# Patient Record
Sex: MALE | Race: WHITE | HISPANIC OR LATINO | URBAN - METROPOLITAN AREA
[De-identification: names, ages, dates, MRNs, and addresses within clinical notes are randomized per-mention and may not be internally consistent; named-entity substitution may affect disease eponyms.]

---

## 2018-04-04 ENCOUNTER — INPATIENT (INPATIENT)
Facility: HOSPITAL | Age: 58
LOS: 1 days | Discharge: HOME | DRG: 638 | End: 2018-04-06
Attending: INTERNAL MEDICINE | Admitting: INTERNAL MEDICINE
Payer: COMMERCIAL

## 2018-04-04 VITALS
SYSTOLIC BLOOD PRESSURE: 131 MMHG | TEMPERATURE: 98 F | RESPIRATION RATE: 18 BRPM | DIASTOLIC BLOOD PRESSURE: 96 MMHG | HEIGHT: 66 IN | WEIGHT: 182.1 LBS | OXYGEN SATURATION: 99 % | HEART RATE: 68 BPM

## 2018-04-04 DIAGNOSIS — R73.9 HYPERGLYCEMIA, UNSPECIFIED: ICD-10-CM

## 2018-04-04 LAB
ACETONE SERPL-MCNC: NEGATIVE — SIGNIFICANT CHANGE UP
ALBUMIN SERPL ELPH-MCNC: 4 G/DL — SIGNIFICANT CHANGE UP (ref 3.5–5)
ALP SERPL-CCNC: 147 U/L — HIGH (ref 40–120)
ALT FLD-CCNC: 42 U/L DA — SIGNIFICANT CHANGE UP (ref 10–60)
ANION GAP SERPL CALC-SCNC: 9 MMOL/L — SIGNIFICANT CHANGE UP (ref 5–17)
AST SERPL-CCNC: 17 U/L — SIGNIFICANT CHANGE UP (ref 10–40)
BASE EXCESS BLDV CALC-SCNC: 6.3 MMOL/L — HIGH (ref -2–2)
BASOPHILS # BLD AUTO: 0.1 K/UL — SIGNIFICANT CHANGE UP (ref 0–0.2)
BASOPHILS NFR BLD AUTO: 1.5 % — SIGNIFICANT CHANGE UP (ref 0–2)
BILIRUB SERPL-MCNC: 1 MG/DL — SIGNIFICANT CHANGE UP (ref 0.2–1.2)
BUN SERPL-MCNC: 19 MG/DL — HIGH (ref 7–18)
CALCIUM SERPL-MCNC: 9 MG/DL — SIGNIFICANT CHANGE UP (ref 8.4–10.5)
CHLORIDE SERPL-SCNC: 94 MMOL/L — LOW (ref 96–108)
CO2 SERPL-SCNC: 28 MMOL/L — SIGNIFICANT CHANGE UP (ref 22–31)
CREAT SERPL-MCNC: 1.7 MG/DL — HIGH (ref 0.5–1.3)
EOSINOPHIL # BLD AUTO: 0.3 K/UL — SIGNIFICANT CHANGE UP (ref 0–0.5)
EOSINOPHIL NFR BLD AUTO: 2.8 % — SIGNIFICANT CHANGE UP (ref 0–6)
GLUCOSE SERPL-MCNC: 457 MG/DL — CRITICAL HIGH (ref 70–99)
HCO3 BLDV-SCNC: 33 MMOL/L — HIGH (ref 21–29)
HCT VFR BLD CALC: 48.7 % — SIGNIFICANT CHANGE UP (ref 39–50)
HGB BLD-MCNC: 16.3 G/DL — SIGNIFICANT CHANGE UP (ref 13–17)
HOROWITZ INDEX BLDV+IHG-RTO: 21 — SIGNIFICANT CHANGE UP
LIDOCAIN IGE QN: 184 U/L — SIGNIFICANT CHANGE UP (ref 73–393)
LYMPHOCYTES # BLD AUTO: 2.1 K/UL — SIGNIFICANT CHANGE UP (ref 1–3.3)
LYMPHOCYTES # BLD AUTO: 21.2 % — SIGNIFICANT CHANGE UP (ref 13–44)
MCHC RBC-ENTMCNC: 28.9 PG — SIGNIFICANT CHANGE UP (ref 27–34)
MCHC RBC-ENTMCNC: 33.5 GM/DL — SIGNIFICANT CHANGE UP (ref 32–36)
MCV RBC AUTO: 86.3 FL — SIGNIFICANT CHANGE UP (ref 80–100)
MONOCYTES # BLD AUTO: 0.5 K/UL — SIGNIFICANT CHANGE UP (ref 0–0.9)
MONOCYTES NFR BLD AUTO: 5.4 % — SIGNIFICANT CHANGE UP (ref 2–14)
NEUTROPHILS # BLD AUTO: 6.7 K/UL — SIGNIFICANT CHANGE UP (ref 1.8–7.4)
NEUTROPHILS NFR BLD AUTO: 69.1 % — SIGNIFICANT CHANGE UP (ref 43–77)
PCO2 BLDV: 55 MMHG — HIGH (ref 35–50)
PH BLDV: 7.39 — SIGNIFICANT CHANGE UP (ref 7.35–7.45)
PLATELET # BLD AUTO: 273 K/UL — SIGNIFICANT CHANGE UP (ref 150–400)
PO2 BLDV: SIGNIFICANT CHANGE UP MMHG (ref 25–45)
POTASSIUM SERPL-MCNC: 3.8 MMOL/L — SIGNIFICANT CHANGE UP (ref 3.5–5.3)
POTASSIUM SERPL-SCNC: 3.8 MMOL/L — SIGNIFICANT CHANGE UP (ref 3.5–5.3)
PROT SERPL-MCNC: 8.4 G/DL — HIGH (ref 6–8.3)
RBC # BLD: 5.64 M/UL — SIGNIFICANT CHANGE UP (ref 4.2–5.8)
RBC # FLD: 10.9 % — SIGNIFICANT CHANGE UP (ref 10.3–14.5)
SAO2 % BLDV: 16 % — LOW (ref 67–88)
SODIUM SERPL-SCNC: 131 MMOL/L — LOW (ref 135–145)
WBC # BLD: 9.7 K/UL — SIGNIFICANT CHANGE UP (ref 3.8–10.5)
WBC # FLD AUTO: 9.7 K/UL — SIGNIFICANT CHANGE UP (ref 3.8–10.5)

## 2018-04-04 PROCEDURE — 99285 EMERGENCY DEPT VISIT HI MDM: CPT | Mod: 25

## 2018-04-04 PROCEDURE — 71045 X-RAY EXAM CHEST 1 VIEW: CPT | Mod: 26

## 2018-04-04 RX ORDER — SODIUM CHLORIDE 9 MG/ML
1000 INJECTION, SOLUTION INTRAVENOUS ONCE
Qty: 0 | Refills: 0 | Status: COMPLETED | OUTPATIENT
Start: 2018-04-04 | End: 2018-04-04

## 2018-04-04 RX ORDER — INSULIN HUMAN 100 [IU]/ML
6 INJECTION, SOLUTION SUBCUTANEOUS ONCE
Qty: 0 | Refills: 0 | Status: DISCONTINUED | OUTPATIENT
Start: 2018-04-04 | End: 2018-04-04

## 2018-04-04 RX ORDER — INSULIN LISPRO 100/ML
8 VIAL (ML) SUBCUTANEOUS ONCE
Qty: 0 | Refills: 0 | Status: DISCONTINUED | OUTPATIENT
Start: 2018-04-04 | End: 2018-04-05

## 2018-04-04 RX ADMIN — SODIUM CHLORIDE 4000 MILLILITER(S): 9 INJECTION, SOLUTION INTRAVENOUS at 22:02

## 2018-04-04 NOTE — ED PROVIDER NOTE - OBJECTIVE STATEMENT
58 y/o M pt w/ PMHx of DM ( not on meds), HTN presents c/o urinary frequency and urge to drink x 2 weeks. Was at this hospital some years ago after findings glucose level of 900; was also hospitalized for an infection of the pancreas at that time. Since then pt has been controlling his diabetes w/ diet and exercise. Over the past two weeks began feeling the urge to drink and has been urinating frequenting. Called his PMD, Dr. Garcia, who told him to present to the ED. Pt denies any other complaints. NKDA.

## 2018-04-04 NOTE — ED PROVIDER NOTE - PHYSICAL EXAMINATION
Attending MD Hensley: A & O x 3, NAD, EOMI b/l, PERRL b/l; lungs CTAB, heart with reg rhythm without murmur; abdomen soft NTND; extremities with no edema; affect appropriate. neuro exam non focal with no motor or sensory deficits. peripheral pulses full and equal in bilateral upper and lower extremities

## 2018-04-04 NOTE — ED PROVIDER NOTE - MEDICAL DECISION MAKING DETAILS
Attending MD Hensley: 57M with DM not on meds, HTN presenting with polyuria, increased thirst, FS high 400s. Plan to obtain labs to ro JESSY

## 2018-04-04 NOTE — ED ADULT NURSE NOTE - OBJECTIVE STATEMENT
Pt stated," I came because my sugar was high when I checked at home." Pt medicated as per order. Pt not in any acute distress.

## 2018-04-05 DIAGNOSIS — Z29.9 ENCOUNTER FOR PROPHYLACTIC MEASURES, UNSPECIFIED: ICD-10-CM

## 2018-04-05 DIAGNOSIS — I10 ESSENTIAL (PRIMARY) HYPERTENSION: ICD-10-CM

## 2018-04-05 DIAGNOSIS — N17.9 ACUTE KIDNEY FAILURE, UNSPECIFIED: ICD-10-CM

## 2018-04-05 DIAGNOSIS — R73.9 HYPERGLYCEMIA, UNSPECIFIED: ICD-10-CM

## 2018-04-05 DIAGNOSIS — E78.5 HYPERLIPIDEMIA, UNSPECIFIED: ICD-10-CM

## 2018-04-05 LAB
ANION GAP SERPL CALC-SCNC: 7 MMOL/L — SIGNIFICANT CHANGE UP (ref 5–17)
APPEARANCE UR: CLEAR — SIGNIFICANT CHANGE UP
BILIRUB UR-MCNC: NEGATIVE — SIGNIFICANT CHANGE UP
BUN SERPL-MCNC: 22 MG/DL — HIGH (ref 7–18)
CALCIUM SERPL-MCNC: 8.2 MG/DL — LOW (ref 8.4–10.5)
CHLORIDE SERPL-SCNC: 101 MMOL/L — SIGNIFICANT CHANGE UP (ref 96–108)
CHOLEST SERPL-MCNC: 165 MG/DL — SIGNIFICANT CHANGE UP (ref 10–199)
CO2 SERPL-SCNC: 27 MMOL/L — SIGNIFICANT CHANGE UP (ref 22–31)
COLOR SPEC: YELLOW — SIGNIFICANT CHANGE UP
CREAT SERPL-MCNC: 1.47 MG/DL — HIGH (ref 0.5–1.3)
DIFF PNL FLD: NEGATIVE — SIGNIFICANT CHANGE UP
GLUCOSE BLDC GLUCOMTR-MCNC: 205 MG/DL — HIGH (ref 70–99)
GLUCOSE BLDC GLUCOMTR-MCNC: 230 MG/DL — HIGH (ref 70–99)
GLUCOSE BLDC GLUCOMTR-MCNC: 307 MG/DL — HIGH (ref 70–99)
GLUCOSE BLDC GLUCOMTR-MCNC: 370 MG/DL — HIGH (ref 70–99)
GLUCOSE BLDC GLUCOMTR-MCNC: 550 MG/DL — CRITICAL HIGH (ref 70–99)
GLUCOSE SERPL-MCNC: 439 MG/DL — HIGH (ref 70–99)
GLUCOSE UR QL: 1000 MG/DL
HBA1C BLD-MCNC: >15.5 % — HIGH (ref 4–5.6)
HDLC SERPL-MCNC: 33 MG/DL — LOW (ref 40–125)
KETONES UR-MCNC: NEGATIVE — SIGNIFICANT CHANGE UP
LEUKOCYTE ESTERASE UR-ACNC: NEGATIVE — SIGNIFICANT CHANGE UP
LIPID PNL WITH DIRECT LDL SERPL: 82 MG/DL — SIGNIFICANT CHANGE UP
NITRITE UR-MCNC: NEGATIVE — SIGNIFICANT CHANGE UP
PH UR: 6.5 — SIGNIFICANT CHANGE UP (ref 5–8)
POTASSIUM SERPL-MCNC: 3.3 MMOL/L — LOW (ref 3.5–5.3)
POTASSIUM SERPL-SCNC: 3.3 MMOL/L — LOW (ref 3.5–5.3)
PROT UR-MCNC: NEGATIVE — SIGNIFICANT CHANGE UP
SODIUM SERPL-SCNC: 135 MMOL/L — SIGNIFICANT CHANGE UP (ref 135–145)
SP GR SPEC: 1.01 — SIGNIFICANT CHANGE UP (ref 1.01–1.02)
TOTAL CHOLESTEROL/HDL RATIO MEASUREMENT: 5 RATIO — SIGNIFICANT CHANGE UP (ref 3.4–9.6)
TRIGL SERPL-MCNC: 250 MG/DL — HIGH (ref 10–149)
UROBILINOGEN FLD QL: NEGATIVE — SIGNIFICANT CHANGE UP

## 2018-04-05 RX ORDER — INSULIN GLARGINE 100 [IU]/ML
20 INJECTION, SOLUTION SUBCUTANEOUS AT BEDTIME
Qty: 0 | Refills: 0 | Status: DISCONTINUED | OUTPATIENT
Start: 2018-04-05 | End: 2018-04-06

## 2018-04-05 RX ORDER — INSULIN LISPRO 100/ML
6 VIAL (ML) SUBCUTANEOUS ONCE
Qty: 0 | Refills: 0 | Status: COMPLETED | OUTPATIENT
Start: 2018-04-05 | End: 2018-04-05

## 2018-04-05 RX ORDER — AMLODIPINE BESYLATE 2.5 MG/1
10 TABLET ORAL DAILY
Qty: 0 | Refills: 0 | Status: DISCONTINUED | OUTPATIENT
Start: 2018-04-05 | End: 2018-04-06

## 2018-04-05 RX ORDER — INSULIN GLARGINE 100 [IU]/ML
15 INJECTION, SOLUTION SUBCUTANEOUS AT BEDTIME
Qty: 0 | Refills: 0 | Status: DISCONTINUED | OUTPATIENT
Start: 2018-04-05 | End: 2018-04-05

## 2018-04-05 RX ORDER — DEXTROSE MONOHYDRATE, SODIUM CHLORIDE, AND POTASSIUM CHLORIDE 50; .745; 4.5 G/1000ML; G/1000ML; G/1000ML
1000 INJECTION, SOLUTION INTRAVENOUS
Qty: 0 | Refills: 0 | Status: DISCONTINUED | OUTPATIENT
Start: 2018-04-05 | End: 2018-04-06

## 2018-04-05 RX ORDER — METOPROLOL TARTRATE 50 MG
100 TABLET ORAL DAILY
Qty: 0 | Refills: 0 | Status: DISCONTINUED | OUTPATIENT
Start: 2018-04-05 | End: 2018-04-06

## 2018-04-05 RX ORDER — INSULIN LISPRO 100/ML
VIAL (ML) SUBCUTANEOUS
Qty: 0 | Refills: 0 | Status: DISCONTINUED | OUTPATIENT
Start: 2018-04-05 | End: 2018-04-06

## 2018-04-05 RX ORDER — DEXTROSE 50 % IN WATER 50 %
1 SYRINGE (ML) INTRAVENOUS ONCE
Qty: 0 | Refills: 0 | Status: DISCONTINUED | OUTPATIENT
Start: 2018-04-05 | End: 2018-04-06

## 2018-04-05 RX ORDER — DEXTROSE 50 % IN WATER 50 %
12.5 SYRINGE (ML) INTRAVENOUS ONCE
Qty: 0 | Refills: 0 | Status: DISCONTINUED | OUTPATIENT
Start: 2018-04-05 | End: 2018-04-06

## 2018-04-05 RX ORDER — POTASSIUM CHLORIDE 20 MEQ
40 PACKET (EA) ORAL EVERY 4 HOURS
Qty: 0 | Refills: 0 | Status: COMPLETED | OUTPATIENT
Start: 2018-04-05 | End: 2018-04-05

## 2018-04-05 RX ORDER — DEXTROSE 50 % IN WATER 50 %
25 SYRINGE (ML) INTRAVENOUS ONCE
Qty: 0 | Refills: 0 | Status: DISCONTINUED | OUTPATIENT
Start: 2018-04-05 | End: 2018-04-06

## 2018-04-05 RX ORDER — SODIUM CHLORIDE 9 MG/ML
1000 INJECTION, SOLUTION INTRAVENOUS
Qty: 0 | Refills: 0 | Status: DISCONTINUED | OUTPATIENT
Start: 2018-04-05 | End: 2018-04-06

## 2018-04-05 RX ORDER — GLUCAGON INJECTION, SOLUTION 0.5 MG/.1ML
1 INJECTION, SOLUTION SUBCUTANEOUS ONCE
Qty: 0 | Refills: 0 | Status: DISCONTINUED | OUTPATIENT
Start: 2018-04-05 | End: 2018-04-06

## 2018-04-05 RX ORDER — ATORVASTATIN CALCIUM 80 MG/1
40 TABLET, FILM COATED ORAL AT BEDTIME
Qty: 0 | Refills: 0 | Status: DISCONTINUED | OUTPATIENT
Start: 2018-04-05 | End: 2018-04-06

## 2018-04-05 RX ORDER — INSULIN LISPRO 100/ML
5 VIAL (ML) SUBCUTANEOUS
Qty: 0 | Refills: 0 | Status: DISCONTINUED | OUTPATIENT
Start: 2018-04-05 | End: 2018-04-06

## 2018-04-05 RX ORDER — HYDROCHLOROTHIAZIDE 25 MG
25 TABLET ORAL DAILY
Qty: 0 | Refills: 0 | Status: DISCONTINUED | OUTPATIENT
Start: 2018-04-05 | End: 2018-04-06

## 2018-04-05 RX ADMIN — Medication 5 UNIT(S): at 12:25

## 2018-04-05 RX ADMIN — AMLODIPINE BESYLATE 10 MILLIGRAM(S): 2.5 TABLET ORAL at 06:18

## 2018-04-05 RX ADMIN — Medication 5: at 09:04

## 2018-04-05 RX ADMIN — ATORVASTATIN CALCIUM 40 MILLIGRAM(S): 80 TABLET, FILM COATED ORAL at 22:06

## 2018-04-05 RX ADMIN — Medication 2: at 16:48

## 2018-04-05 RX ADMIN — Medication 5 UNIT(S): at 16:49

## 2018-04-05 RX ADMIN — INSULIN GLARGINE 15 UNIT(S): 100 INJECTION, SOLUTION SUBCUTANEOUS at 01:11

## 2018-04-05 RX ADMIN — DEXTROSE MONOHYDRATE, SODIUM CHLORIDE, AND POTASSIUM CHLORIDE 125 MILLILITER(S): 50; .745; 4.5 INJECTION, SOLUTION INTRAVENOUS at 16:49

## 2018-04-05 RX ADMIN — Medication 6 UNIT(S): at 01:03

## 2018-04-05 RX ADMIN — SODIUM CHLORIDE 4000 MILLILITER(S): 9 INJECTION, SOLUTION INTRAVENOUS at 01:11

## 2018-04-05 RX ADMIN — Medication 40 MILLIEQUIVALENT(S): at 10:34

## 2018-04-05 RX ADMIN — Medication 40 MILLIEQUIVALENT(S): at 15:28

## 2018-04-05 RX ADMIN — Medication 6: at 11:33

## 2018-04-05 RX ADMIN — DEXTROSE MONOHYDRATE, SODIUM CHLORIDE, AND POTASSIUM CHLORIDE 125 MILLILITER(S): 50; .745; 4.5 INJECTION, SOLUTION INTRAVENOUS at 15:30

## 2018-04-05 RX ADMIN — Medication 25 MILLIGRAM(S): at 06:18

## 2018-04-05 RX ADMIN — INSULIN GLARGINE 20 UNIT(S): 100 INJECTION, SOLUTION SUBCUTANEOUS at 22:06

## 2018-04-05 RX ADMIN — DEXTROSE MONOHYDRATE, SODIUM CHLORIDE, AND POTASSIUM CHLORIDE 125 MILLILITER(S): 50; .745; 4.5 INJECTION, SOLUTION INTRAVENOUS at 06:39

## 2018-04-05 NOTE — H&P ADULT - NSHPPHYSICALEXAM_GEN_ALL_CORE
Vital Signs Last 24 Hrs  T(C): 36.6 (04 Apr 2018 23:45), Max: 36.7 (04 Apr 2018 20:59)  T(F): 97.9 (04 Apr 2018 23:45), Max: 98 (04 Apr 2018 20:59)  HR: 63 (04 Apr 2018 23:45) (63 - 68)  BP: 109/66 (04 Apr 2018 23:45) (109/66 - 131/96)  BP(mean): --  RR: 18 (04 Apr 2018 23:45) (18 - 18)  SpO2: 99% (04 Apr 2018 23:45) (99% - 99%)    GENERAL: NAD  HEAD:  Atraumatic, Normocephalic  EYES: EOMI, PERRLA, conjunctiva and sclera clear  ENMT: Moist mucous membranes  NECK: Supple  NERVOUS SYSTEM:  Alert & Oriented X3  CHEST/LUNG: Clear to auscultation bilaterally; No rales, rhonchi, wheezing, or rubs  HEART: Regular rate and rhythm; No murmurs, rubs, or gallops  ABDOMEN: Soft, Nontender, Nondistended; Bowel sounds present  EXTREMITIES:  2+ Peripheral Pulses, No clubbing, cyanosis, or edema

## 2018-04-05 NOTE — H&P ADULT - PROBLEM SELECTOR PLAN 5
[] Previous VTE                                                3  [] Thrombophilia                                             2  [] Lower limb paralysis                                   2    [] Current Cancer                                             2   [X] Immobilization > 24 hrs                              1  [] ICU/CCU stay > 24 hours                             1  [] Age > 60                                                         1    IMPROVE VTE Score: 1  SCDs

## 2018-04-05 NOTE — H&P ADULT - HISTORY OF PRESENT ILLNESS
58 y/o M with PMHx of DM, HTN, and HLD came in with c/o  polyuria and polydipsia since 2 weeks. Patient states that he was last admitted her [facility] 4 years ago with similar presentation, his BG was ~900 on admission and he was treated for pancreatitis. He was discharged on insulin which he took for about 1 month. However his PCP the discontinued insulin and said that he has borderline diabetes which can be controlled with exercise and diet. Patient has been complaint with diet and exercise, and says that he sporadically checks his BG, and it is usually in 120s until today when it was ~450. He denies abdominal pain, nausea or vomiting. He denies recent illness, except for a mild cold about 2 weeks ago. Denies dysuria, or hematuria. No other complaints at this time.    SH: Denies smoking, alcohol or illicit drug use. Lives with family. Ambulates independently  FH: insignificant  NKDA

## 2018-04-05 NOTE — CONSULT NOTE ADULT - ASSESSMENT
1.diabetes mellitus  poorly controlled  aic 15.5  will need insulin  continue lantus 25/humalog 6 prmeals  education  compliance  optimise control as op  d/w pt goals  avoid alcohol

## 2018-04-05 NOTE — H&P ADULT - PROBLEM SELECTOR PLAN 1
BG-482 on admission. AGAP- 9, No acidosis on vBG  Lipase- 184 (wnl)  Not on antidiabetics medications at home  Per patient he has borderline DM controlled with diet and exercise  Now presenting with polyuria and polydipsia  - f/u A1c  - pt is s/p 2L NS bolus, Will start NS +Kcl @125ml/hr  - Give Lispro 6U stat  - Start Lantus at 0.2U/kg--> 15U qHS + sliding scale  - Accuchecks q4 for now  - Lifestyle modifications

## 2018-04-05 NOTE — H&P ADULT - NSHPLABSRESULTS_GEN_ALL_CORE
16.3   9.7   )-----------( 273      ( 04 Apr 2018 21:59 )             48.7     04-04    131<L>  |  94<L>  |  19<H>  ----------------------------<  457<HH>  3.8   |  28  |  1.70<H>    Ca    9.0      04 Apr 2018 21:59    TPro  8.4<H>  /  Alb  4.0  /  TBili  1.0  /  DBili  x   /  AST  17  /  ALT  42  /  AlkPhos  147<H>  04-04

## 2018-04-05 NOTE — H&P ADULT - PROBLEM SELECTOR PLAN 2
Secondary to hyperglycemia with polyuria and polydipsia  s/p 2L NS bolus  - Start NS+KCl @125 ml/hr  - Will hold off to losartan  - Monitor BMP

## 2018-04-05 NOTE — H&P ADULT - NSHPREVIEWOFSYSTEMS_GEN_ALL_CORE
REVIEW OF SYSTEMS:  CONSTITUTIONAL: No fever, weight loss, or fatigue  EYES: No eye pain, visual disturbances, or discharge  ENMT:  No difficulty hearing, tinnitus, vertigo; No sinus or throat pain  NECK: No pain or stiffness  RESPIRATORY: No cough, wheezing, chills or hemoptysis; No shortness of breath  CARDIOVASCULAR: No chest pain, palpitations, dizziness, or leg swelling  GASTROINTESTINAL:  No abdominal or epigastric pain. No nausea, vomiting, or hematemesis; No diarrhea or constipation. No melena or hematochezia.  GENITOURINARY: No dysuria, frequency, hematuria, or incontinence  NEUROLOGICAL: No headaches, memory loss, loss of strength, numbness, or tremors  SKIN: No itching, burning, rashes, or lesions   LYMPH NODES: No enlarged glands  ENDOCRINE: polyuria, polydipsia  MUSCULOSKELETAL: No joint pain or swelling; No muscle, back, or extremity pain

## 2018-04-05 NOTE — CHART NOTE - NSCHARTNOTEFT_GEN_A_CORE
PATIENT WAS SEEN AND EXAMINED   - UNCONTROLLED DM - WILL DC PATIENT IN AM ON INSULIN 70/30,  30 UNITS Q AM  AND  15 UNITS  Q PM.   - ARF - ON IVF , RPT BMP IN AM, LOSARTAN / HCTZ IS ON HOLD, WILL MONITOR RENAL Fx AND BMP AS OUT PT.  - DC PLAN PT IN AM IF STABLE AND WILL F/ UP PATIENT ON NEXT WEEK FRIDAY IN OFFICE.  - PATIENT WILL BE F/UP BY HOSPITALIST TEAM UNTIL SUNDAY 04-, IF HE STAYS IN HOSPITAL  - DR. DRAKE

## 2018-04-05 NOTE — CONSULT NOTE ADULT - SUBJECTIVE AND OBJECTIVE BOX
HPI:  56 y/o M with PMHx of DM, HTN, and HLD came in with c/o  polyuria and polydipsia since 2 weeks. Patient states that he was last admitted her [facility] 4 years ago with similar presentation, his BG was ~900 on admission and he was treated for pancreatitis. He was discharged on insulin which he took for about 1 month. However his PCP the discontinued insulin and said that he has borderline diabetes which can be controlled with exercise and diet. Patient has been complaint with diet and exercise, and says that he sporadically checks his BG, and it is usually in 120s until today when it was ~450. He denies abdominal pain, nausea or vomiting. He denies recent illness, except for a mild cold about 2 weeks ago. Denies dysuria, or hematuria. No other complaints at this time.  recent wt loss  weakness  SH: Denies smoking, alcohol or illicit drug use. Lives with family. Ambulates independently  FH: insignificant  NKDA (05 Apr 2018 00:50)      PAST MEDICAL & SURGICAL HISTORY:  HTN (hypertension)  Diabetes  No significant past surgical history      No Known Allergies      amLODIPine   Tablet 10 milliGRAM(s) Oral daily  atorvastatin 40 milliGRAM(s) Oral at bedtime  dextrose 5%. 1000 milliLiter(s) IV Continuous <Continuous>  dextrose 50% Injectable 12.5 Gram(s) IV Push once  dextrose 50% Injectable 25 Gram(s) IV Push once  dextrose 50% Injectable 25 Gram(s) IV Push once  dextrose Gel 1 Dose(s) Oral once PRN  glucagon  Injectable 1 milliGRAM(s) IntraMuscular once PRN  hydrochlorothiazide 25 milliGRAM(s) Oral daily  insulin glargine Injectable (LANTUS) 20 Unit(s) SubCutaneous at bedtime  insulin lispro (HumaLOG) corrective regimen sliding scale   SubCutaneous three times a day before meals  insulin lispro Injectable (HumaLOG) 5 Unit(s) SubCutaneous three times a day before meals  metoprolol succinate  milliGRAM(s) Oral daily  sodium chloride 0.9% with potassium chloride 20 mEq/L 1000 milliLiter(s) IV Continuous <Continuous>      Social Hx:    FAMILY HISTORY: diabetes yes      REVIEW OF SYSTEMS:wt loss  weakness  polyuria  polydipsia    CONSTITUTIONAL: No weakness, fevers or chills  EYES/ENT: No visual changes;  No vertigo or throat pain   NECK: No pain or stiffness  RESPIRATORY: No cough, wheezing, hemoptysis; No shortness of breath  CARDIOVASCULAR: No chest pain or palpitations  GASTROINTESTINAL: No abdominal or epigastric pain. No nausea, vomiting, or hematemesis; No diarrhea or constipation. No melena or hematochezia.  GENITOURINARY: No dysuria,  hematuria  NEUROLOGICAL: No numbness or weakness  SKIN: No itching, burning, rashes, or lesions   All other review of systems is negative unless indicated above.  PHYSICAL EXAM:    Vital Signs Last 24 Hrs  T(C): 36.4 (05 Apr 2018 16:08), Max: 36.7 (04 Apr 2018 20:59)  T(F): 97.6 (05 Apr 2018 16:08), Max: 98 (04 Apr 2018 20:59)  HR: 60 (05 Apr 2018 16:08) (56 - 68)  BP: 115/69 (05 Apr 2018 16:08) (101/58 - 131/96)  BP(mean): --  RR: 17 (05 Apr 2018 16:08) (17 - 18)  SpO2: 96% (05 Apr 2018 16:08) (96% - 100%)    Constitutional: wdwn male  awake alert  in nad  lungs clear  cardia reg  abd soft  neuro ok    HEENT:    Neck:  [  ] Supple  [  ]Lymphadenopathy  [  ] JVD   [  ]No JVD  [  ] Masses   [  ] WNL    CHEST/Respiratory:  [  ] Rales      [  ] Rhonchi      [  ] Wheezing       [  ] Chest Tenderness  [  ]Clear to auscultation    Cardiovascular:  [  ]S1 S2  [  ] Reg  [  ] Irreg   [  ] Murmurs  [  ]Systolic [  ]Diastolic  [  ]No Murmur    Abdomen:  [  ]  Bowel Sounds   [  ] Soft           [  ] ABD Distention  [  ] Tenderness  [  ] Organomegaly                        [  ] Guarding Rigidity  [  ] No Guarding Rigidity  [  ] Rebound Tenderness [  ] No Rebound Tenderness    Extremities: [  ] Edema  [  ] No edema  [  ] Clubbing   [  ] Cyanosis  [  ] Palpable peripheral pulses                        [  ] No Tender Calf muscles  [  ] Tender Calf muscles    Neurological:  [  ] Alert  [  ] Awake  [  ] Oriented  x                              [  ] Confused    Skin:  [  ] Thrombophlebitis  [  ] Rashes  [  ] Dry  [  ] Ulcers    Ortho:  [  ] Joint Swelling  [  ] Joint erythema [  ] DJD [  ] Increased temp. to touch      LABS/DIAGNOSTIC TESTS                          16.3   9.7   )-----------( 273      ( 04 Apr 2018 21:59 )             48.7     WBC Count: 9.7 K/uL (04-04 @ 21:59)      04-05    135  |  101  |  22<H>  ----------------------------<  439<H>  3.3<L>   |  27  |  1.47<H>    Ca    8.2<L>      05 Apr 2018 05:54    TPro  8.4<H>  /  Alb  4.0  /  TBili  1.0  /  DBili  x   /  AST  17  /  ALT  42  /  AlkPhos  147<H>  04-04          LIVER FUNCTIONS - ( 04 Apr 2018 21:59 )  Alb: 4.0 g/dL / Pro: 8.4 g/dL / ALK PHOS: 147 U/L / ALT: 42 U/L DA / AST: 17 U/L / GGT: x                 LACTATE:      CULTURES:   amLODIPine   Tablet 10 milliGRAM(s) Oral daily  atorvastatin 40 milliGRAM(s) Oral at bedtime  dextrose 5%. 1000 milliLiter(s) IV Continuous <Continuous>  dextrose 50% Injectable 12.5 Gram(s) IV Push once  dextrose 50% Injectable 25 Gram(s) IV Push once  dextrose 50% Injectable 25 Gram(s) IV Push once  dextrose Gel 1 Dose(s) Oral once PRN  glucagon  Injectable 1 milliGRAM(s) IntraMuscular once PRN  hydrochlorothiazide 25 milliGRAM(s) Oral daily  insulin glargine Injectable (LANTUS) 20 Unit(s) SubCutaneous at bedtime  insulin lispro (HumaLOG) corrective regimen sliding scale   SubCutaneous three times a day before meals  insulin lispro Injectable (HumaLOG) 5 Unit(s) SubCutaneous three times a day before meals  metoprolol succinate  milliGRAM(s) Oral daily  sodium chloride 0.9% with potassium chloride 20 mEq/L 1000 milliLiter(s) IV Continuous <Continuous>      RADIOLOGY    CXR:

## 2018-04-05 NOTE — H&P ADULT - ASSESSMENT
56 y/o M with PMHx of DM, HTN, and HLD came in with c/o  polyuria and polydipsia since 2 weeks. Patient is admitted for hyperglycemia.

## 2018-04-06 VITALS
RESPIRATION RATE: 16 BRPM | OXYGEN SATURATION: 100 % | TEMPERATURE: 99 F | DIASTOLIC BLOOD PRESSURE: 77 MMHG | HEART RATE: 69 BPM | SYSTOLIC BLOOD PRESSURE: 124 MMHG

## 2018-04-06 LAB
ANION GAP SERPL CALC-SCNC: 9 MMOL/L — SIGNIFICANT CHANGE UP (ref 5–17)
BUN SERPL-MCNC: 19 MG/DL — HIGH (ref 7–18)
CALCIUM SERPL-MCNC: 8 MG/DL — LOW (ref 8.4–10.5)
CHLORIDE SERPL-SCNC: 108 MMOL/L — SIGNIFICANT CHANGE UP (ref 96–108)
CO2 SERPL-SCNC: 22 MMOL/L — SIGNIFICANT CHANGE UP (ref 22–31)
CREAT SERPL-MCNC: 1.1 MG/DL — SIGNIFICANT CHANGE UP (ref 0.5–1.3)
GLUCOSE BLDC GLUCOMTR-MCNC: 258 MG/DL — HIGH (ref 70–99)
GLUCOSE BLDC GLUCOMTR-MCNC: 327 MG/DL — HIGH (ref 70–99)
GLUCOSE SERPL-MCNC: 243 MG/DL — HIGH (ref 70–99)
HCT VFR BLD CALC: 40.5 % — SIGNIFICANT CHANGE UP (ref 39–50)
HGB BLD-MCNC: 13.7 G/DL — SIGNIFICANT CHANGE UP (ref 13–17)
MCHC RBC-ENTMCNC: 29.5 PG — SIGNIFICANT CHANGE UP (ref 27–34)
MCHC RBC-ENTMCNC: 33.7 GM/DL — SIGNIFICANT CHANGE UP (ref 32–36)
MCV RBC AUTO: 87.6 FL — SIGNIFICANT CHANGE UP (ref 80–100)
PLATELET # BLD AUTO: 186 K/UL — SIGNIFICANT CHANGE UP (ref 150–400)
POTASSIUM SERPL-MCNC: 3.4 MMOL/L — LOW (ref 3.5–5.3)
POTASSIUM SERPL-SCNC: 3.4 MMOL/L — LOW (ref 3.5–5.3)
RBC # BLD: 4.63 M/UL — SIGNIFICANT CHANGE UP (ref 4.2–5.8)
RBC # FLD: 11 % — SIGNIFICANT CHANGE UP (ref 10.3–14.5)
SODIUM SERPL-SCNC: 139 MMOL/L — SIGNIFICANT CHANGE UP (ref 135–145)
WBC # BLD: 7.9 K/UL — SIGNIFICANT CHANGE UP (ref 3.8–10.5)
WBC # FLD AUTO: 7.9 K/UL — SIGNIFICANT CHANGE UP (ref 3.8–10.5)

## 2018-04-06 PROCEDURE — 80061 LIPID PANEL: CPT

## 2018-04-06 PROCEDURE — 71045 X-RAY EXAM CHEST 1 VIEW: CPT

## 2018-04-06 PROCEDURE — 83690 ASSAY OF LIPASE: CPT

## 2018-04-06 PROCEDURE — 82009 KETONE BODYS QUAL: CPT

## 2018-04-06 PROCEDURE — 81003 URINALYSIS AUTO W/O SCOPE: CPT

## 2018-04-06 PROCEDURE — 83036 HEMOGLOBIN GLYCOSYLATED A1C: CPT

## 2018-04-06 PROCEDURE — 80053 COMPREHEN METABOLIC PANEL: CPT

## 2018-04-06 PROCEDURE — 85027 COMPLETE CBC AUTOMATED: CPT

## 2018-04-06 PROCEDURE — 99285 EMERGENCY DEPT VISIT HI MDM: CPT | Mod: 25

## 2018-04-06 PROCEDURE — 80048 BASIC METABOLIC PNL TOTAL CA: CPT

## 2018-04-06 PROCEDURE — 82962 GLUCOSE BLOOD TEST: CPT

## 2018-04-06 PROCEDURE — 82803 BLOOD GASES ANY COMBINATION: CPT

## 2018-04-06 RX ORDER — ISOPROPYL ALCOHOL, BENZOCAINE .7; .06 ML/ML; ML/ML
1 SWAB TOPICAL
Qty: 1 | Refills: 0
Start: 2018-04-06 | End: 2018-05-05

## 2018-04-06 RX ORDER — POTASSIUM CHLORIDE 20 MEQ
40 PACKET (EA) ORAL EVERY 4 HOURS
Qty: 0 | Refills: 0 | Status: COMPLETED | OUTPATIENT
Start: 2018-04-06 | End: 2018-04-06

## 2018-04-06 RX ORDER — INSULIN LISPRO 100/ML
7 VIAL (ML) SUBCUTANEOUS
Qty: 0 | Refills: 0 | Status: DISCONTINUED | OUTPATIENT
Start: 2018-04-06 | End: 2018-04-06

## 2018-04-06 RX ORDER — INSULIN NPH HUM/REG INSULIN HM 70-30/ML
25 VIAL (ML) SUBCUTANEOUS
Qty: 1 | Refills: 0
Start: 2018-04-06 | End: 2018-05-05

## 2018-04-06 RX ORDER — INSULIN GLARGINE 100 [IU]/ML
24 INJECTION, SOLUTION SUBCUTANEOUS AT BEDTIME
Qty: 0 | Refills: 0 | Status: DISCONTINUED | OUTPATIENT
Start: 2018-04-06 | End: 2018-04-06

## 2018-04-06 RX ORDER — INSULIN NPH HUM/REG INSULIN HM 70-30/ML
15 VIAL (ML) SUBCUTANEOUS
Qty: 1 | Refills: 0
Start: 2018-04-06 | End: 2018-05-05

## 2018-04-06 RX ADMIN — Medication 4: at 12:59

## 2018-04-06 RX ADMIN — Medication 100 MILLIGRAM(S): at 05:31

## 2018-04-06 RX ADMIN — AMLODIPINE BESYLATE 10 MILLIGRAM(S): 2.5 TABLET ORAL at 05:31

## 2018-04-06 RX ADMIN — Medication 7 UNIT(S): at 13:00

## 2018-04-06 RX ADMIN — Medication 25 MILLIGRAM(S): at 05:31

## 2018-04-06 RX ADMIN — Medication 3: at 08:45

## 2018-04-06 RX ADMIN — Medication 40 MILLIEQUIVALENT(S): at 13:00

## 2018-04-06 RX ADMIN — Medication 40 MILLIEQUIVALENT(S): at 15:00

## 2018-04-06 NOTE — DISCHARGE NOTE ADULT - CARE PROVIDER_API CALL
Royer Murdock (MBBS), Medicine  [address] [place] [address]  Phone: [phone]  Fax: [phone]

## 2018-04-06 NOTE — DISCHARGE NOTE ADULT - HOSPITAL COURSE
Patient is a 56 y/o M with PMHx of DM, HTN, and HLD came in with c/o  polyuria and polydipsia since 2 weeks. Patient is admitted for hyperglycemia.with no AG or metabolic acidosis, patient was started on alntus 20U QHS and 5U Humalog TID, with BS ranging from 200-250. Patient’s out patient doctor is Dr Murdock and has a close follow up with him. Patient will be discharged on 25U humulin 70/30 in AM and 15U at night. He knows how to use glucometer and inject in insulin as he has done it in the past. Also had DAVID 2/2 to hyperglycemia and dehydration that has improved  Patient may be discharged home with office follow up with Dr Murdock next week. Plan d/w agreed upon by Dr Murdock

## 2018-04-06 NOTE — DISCHARGE NOTE ADULT - PATIENT PORTAL LINK FT
You can access the [facility] Patient Portal, offered by [facility], by registering with the following website: [email or web address]

## 2018-04-06 NOTE — DISCHARGE NOTE ADULT - MEDICATION SUMMARY - MEDICATIONS TO STOP TAKING
I will STOP taking the medications listed below when I get home from the hospital:    hydrochlorothiazide-losartan  --  by mouth

## 2018-04-06 NOTE — PROGRESS NOTE ADULT - SUBJECTIVE AND OBJECTIVE BOX
HPI:  58 y/o M with PMHx of DM, HTN, and HLD came in with c/o  polyuria and polydipsia since 2 weeks. Patient states that he was last admitted her [facility] 4 years ago with similar presentation, his BG was ~900 on admission and he was treated for pancreatitis. He was discharged on insulin which he took for about 1 month. However his PCP the discontinued insulin and said that he has borderline diabetes which can be controlled with exercise and diet. Patient has been complaint with diet and exercise, and says that he sporadically checks his BG, and it is usually in 120s until today when it was ~450. He denies abdominal pain, nausea or vomiting. He denies recent illness, except for a mild cold about 2 weeks ago. Denies dysuria, or hematuria. No other complaints at this time.  admitted with severe hyperglycemia  started on insulin  SH: Denies smoking, alcohol or illicit drug use. Lives with family. Ambulates independently  FH: insignificant  NKDA (05 Apr 2018 00:50)      Meds:    Allergies:  Allergies    No Known Allergies    Intolerances        REVIEW OF SYSTEMS:  kaylee diet  no vomiting  no abd pain  CONSTITUTIONAL: No weakness, fevers or chills  EYES/ENT: No visual changes;  No vertigo or throat pain   NECK: No pain or stiffness  RESPIRATORY: No cough, wheezing, hemoptysis; No shortness of breath  CARDIOVASCULAR: No chest pain or palpitations  GASTROINTESTINAL: No abdominal or epigastric pain. No nausea, vomiting, or hematemesis; No diarrhea or constipation. No melena or hematochezia.  GENITOURINARY: No dysuria, frequency or hematuria  NEUROLOGICAL: No numbness or weakness  SKIN: No itching, burning, rashes, or lesions   All other review of systems is negative unless indicated above.    PHYSICAL EXAM:    Vital Signs Last 24 Hrs  T(C): 37 (06 Apr 2018 14:30), Max: 37 (06 Apr 2018 14:30)  T(F): 98.6 (06 Apr 2018 14:30), Max: 98.6 (06 Apr 2018 14:30)  HR: 69 (06 Apr 2018 14:30) (62 - 69)  BP: 124/77 (06 Apr 2018 14:30) (106/65 - 124/77)  BP(mean): --  RR: 16 (06 Apr 2018 14:30) (16 - 18)  SpO2: 100% (06 Apr 2018 14:30) (98% - 100%)    HEENT:awake alert  in nad  lungs clear  cardia reg  abd soft  ambulating    Neck:  [  ] Supple  [  ] Lymphadenopathy  [  ] JVD  [  ] Masses  [  ] WNL    CHEST/Respiratory: [ ] Clear to auscultation     [  ] Rales      [  ] Rhonchi      [  ] Wheezing       [  ] Chest Tenderness     [  ] WNL    Cardiovascular:  [  ]S1 S2  [  ] Reg  [  ] Irreg   [  ] No Murmurs   [  ] Murmurs  [  ] Systolic [  ] Diastolic    Gastrointestinal:  [  ] Bowel Sounds  [   ] ABD Soft  [  ] ABD Distention   [  ] No Tenderness [  ] Tenderness  [  ] Organomegaly  [  ] Guarding rigidity  [  ] No Guarding rigidity  [  ] Rebound Tenderness [  ] No Rebound Tenderness    Extremities: [  ] Edema  [  ] No Edema  [  ] Clubbing   [  ] Cyanosis  [  ] Palpable peripheral pulses                          [  ] Tender calf muscles    [  ] No Tender Calf Muscles    Neurological:  [  ] Alert  [  ] Awake  [  ] Oriented  x                              [  ] Focal weakness  [  ] No Focal Weakness    Skin:  [  ] Thrombophlebitis  [  ] Rashes  [  ] Dry  [  ] Ulcers    Ortho:  [  ] Joint Swelling  [  ] Joint erythema [  ] DJD [  ] Increased Temperature to Touch      LABS/DIAGNOSTIC TESTS                          13.7   7.9   )-----------( 186      ( 06 Apr 2018 06:41 )             40.5         04-06    139  |  108  |  19<H>  ----------------------------<  243<H>  3.4<L>   |  22  |  1.10    Ca    8.0<L>      06 Apr 2018 06:41    TPro  8.4<H>  /  Alb  4.0  /  TBili  1.0  /  DBili  x   /  AST  17  /  ALT  42  /  AlkPhos  147<H>  04-04      CAPILLARY BLOOD GLUCOSE      POCT Blood Glucose.: 327 mg/dL (06 Apr 2018 11:57)  POCT Blood Glucose.: 258 mg/dL (06 Apr 2018 08:03)  POCT Blood Glucose.: 230 mg/dL (05 Apr 2018 21:11)      LIVER FUNCTIONS - ( 04 Apr 2018 21:59 )  Alb: 4.0 g/dL / Pro: 8.4 g/dL / ALK PHOS: 147 U/L / ALT: 42 U/L DA / AST: 17 U/L / GGT: x           Hemoglobin A1C, Whole Blood: >15.5 % (04-05 @ 10:01)      CULTURES:       RADIOLOGY  CXR:    amLODIPine   Tablet 10 milliGRAM(s) Oral daily  atorvastatin 40 milliGRAM(s) Oral at bedtime  dextrose 5%. 1000 milliLiter(s) IV Continuous <Continuous>  dextrose 50% Injectable 12.5 Gram(s) IV Push once  dextrose 50% Injectable 25 Gram(s) IV Push once  dextrose 50% Injectable 25 Gram(s) IV Push once  dextrose Gel 1 Dose(s) Oral once PRN  glucagon  Injectable 1 milliGRAM(s) IntraMuscular once PRN  hydrochlorothiazide 25 milliGRAM(s) Oral daily  insulin glargine Injectable (LANTUS) 24 Unit(s) SubCutaneous at bedtime  insulin lispro (HumaLOG) corrective regimen sliding scale   SubCutaneous three times a day before meals  insulin lispro Injectable (HumaLOG) 7 Unit(s) SubCutaneous three times a day before meals  metoprolol succinate  milliGRAM(s) Oral daily

## 2018-04-06 NOTE — DISCHARGE NOTE ADULT - PLAN OF CARE
better glycemic control You will be discharged on humulin 70/30, 25Units in morning and 15Units in the evening and close follow up with Dr Murdock after discharge. continue lifestyle modifications.  check blood sugar 3-4 times a day hold hyzaar, continue other meds as prescribed  low salt diet and exercise as tolerated resolved

## 2018-04-06 NOTE — PROGRESS NOTE ADULT - ASSESSMENT
1.diabetes/uncontrolled  restarted on insulin  improving  cont lantus 25/humalog premeal 6-10  adjust as op  compliance  d/w pt goals

## 2018-04-06 NOTE — DISCHARGE NOTE ADULT - CARE PLAN
Principal Discharge DX:	Hyperglycemia  Goal:	better glycemic control  Assessment and plan of treatment:	You will be discharged on humulin 70/30, 25Units in morning and 15Units in the evening and close follow up with Dr Murdock after discharge. continue lifestyle modifications.  check blood sugar 3-4 times a day  Secondary Diagnosis:	HTN (hypertension)  Assessment and plan of treatment:	hold hyzaar, continue other meds as prescribed  low salt diet and exercise as tolerated  Secondary Diagnosis:	DAVID (acute kidney injury)  Assessment and plan of treatment:	resolved

## 2018-04-06 NOTE — PROGRESS NOTE ADULT - SUBJECTIVE AND OBJECTIVE BOX
ISAAC GARCIA   [id number]   57y/Male    Patient is a 57y old  Male who presents with a chief complaint of Polyuria and polydipsia (2018 00:50)                                                                                     INTERVAL HPI/OVERNIGHT EVENTS:      no overnight events        Patient seen and examined at bedside.  Denies chest pain, palpitations, SOB, nausea, vomiting, abdominal pain.        T(C): 36.7 (18 @ 05:01), Max: 36.8 (18 @ 20:32)  HR: 62 (18 @ 05:01) (60 - 64)  BP: 113/69 (18 @ 05:01) (101/58 - 115/69)  RR: 18 (18 @ 05:01) (17 - 18)  SpO2: 98% (18 @ 05:01) (96% - 100%)  Wt(kg): --  I&O's Summary                                                                                                          PHYSICAL EXAM    	GENERAL: NAD  	HEAD:  Atraumatic, Normocephalic  	EYES: EOMI, PERRLA, conjunctiva and sclera clear  	ENMT: Moist mucous membranes  	NECK: Supple  	NERVOUS SYSTEM:  Alert & Oriented X3  	CHEST/LUNG: Clear to auscultation bilaterally; No rales, rhonchi, wheezing, or rubs  	HEART: Regular rate and rhythm; No murmurs, rubs, or gallops  	ABDOMEN: Soft, Nontender, Nondistended; Bowel sounds present  EXTREMITIES:  2+ Peripheral Pulses, No clubbing, cyanosis, or edema                                                                                                           LABS:                        13.7   7.9   )-----------( 186      ( 2018 06:41 )             40.5     -    139  |  108  |  19<H>  ----------------------------<  243<H>  3.4<L>   |  22  |  1.10    Ca    8.0<L>      2018 06:41    TPro  8.4<H>  /  Alb  4.0  /  TBili  1.0  /  DBili  x   /  AST  17  /  ALT  42  /  AlkPhos  147<H>  04-04      Urinalysis Basic - ( 2018 22:55 )    Color: Yellow / Appearance: Clear / S.015 / pH: x  Gluc: x / Ketone: Negative  / Bili: Negative / Urobili: Negative   Blood: x / Protein: Negative / Nitrite: Negative   Leuk Esterase: Negative / RBC: x / WBC x   Sq Epi: x / Non Sq Epi: x / Bacteria: x      CAPILLARY BLOOD GLUCOSE      POCT Blood Glucose.: 258 mg/dL (2018 08:03)  POCT Blood Glucose.: 230 mg/dL (2018 21:11)  POCT Blood Glucose.: 205 mg/dL (2018 16:27)  POCT Blood Glucose.: 307 mg/dL (2018 14:39)  POCT Blood Glucose.: 550 mg/dL (2018 10:59)  POCT Blood Glucose.: 370 mg/dL (2018 08:59)          Urinalysis Basic - ( 2018 22:55 )    Color: Yellow / Appearance: Clear / S.015 / pH: x  Gluc: x / Ketone: Negative  / Bili: Negative / Urobili: Negative   Blood: x / Protein: Negative / Nitrite: Negative   Leuk Esterase: Negative / RBC: x / WBC x   Sq Epi: x / Non Sq Epi: x / Bacteria: x            Radiology:      EKG:

## 2018-04-06 NOTE — PROGRESS NOTE ADULT - PROBLEM SELECTOR PLAN 1
Patient with poorly controlled DM< with A1C: 15.5  increased Lantus to 24U QHS, Humalog 7U TID  FS have been >200

## 2018-04-06 NOTE — DISCHARGE NOTE ADULT - MEDICATION SUMMARY - MEDICATIONS TO TAKE
I will START or STAY ON the medications listed below when I get home from the hospital:    glucometer strips  -- glucometer strips  -- Indication: For Diabetes    alcohol swabs  -- 30 alcohol swabs  -- Indication: For Diabetes    lancets  -- 30 lancets for use for acuchecks  -- Indication: For Diabetes    HumuLIN 70/30 subcutaneous suspension  -- 25 unit(s) subcutaneous once a day (in the morning)   -- Do not drink alcoholic beverages when taking this medication.  It is very important that you take or use this exactly as directed.  Do not skip doses or discontinue unless directed by your doctor.  Keep in refrigerator.  Do not freeze.    -- Indication: For Diabetes    HumuLIN 70/30 subcutaneous suspension  -- 15 unit(s) subcutaneous once a day (in the evening)   -- Do not drink alcoholic beverages when taking this medication.  It is very important that you take or use this exactly as directed.  Do not skip doses or discontinue unless directed by your doctor.  Keep in refrigerator.  Do not freeze.    -- Indication: For Diabetes    Lipitor  -- 40 milligram(s) by mouth once a day (at bedtime)  -- Indication: For HLD (hyperlipidemia)    Metoprolol Succinate  mg oral tablet, extended release  -- 1 tab(s) by mouth once a day  -- Indication: For HTN (hypertension)    Norvasc  -- 10 milligram(s) by mouth once a day  -- Indication: For HTN (hypertension)    hydroCHLOROthiazide 25 mg oral tablet  -- 1 tab(s) by mouth once a day  -- Indication: For HTN (hypertension)

## 2018-04-26 NOTE — PATIENT PROFILE ADULT. - PAIN SCALE PREFERRED, PROFILE
"  Vomiting (Adult)  Vomiting is a common symptom that may be due to different causes. These include gastroenteritis ("stomach flu"), food poisoning and gastritis. There are other more serious causes of vomiting which may be hard to diagnose early in the illness. Therefore, it is important to watch for the warning signs listed below.  The main danger from repeated vomiting is dehydration. This is due to excess loss of water and minerals from the body. When this occurs, body fluids must be replaced.  Home care  · If symptoms are severe, rest at home for the next 24 hours.  · Because your symptoms may be from an infection, wash your hands frequently and well, and use alcohol-based  to avoid spreading the infection to others.  · Wash your hands for at least 20 seconds. Hum the happy birthday song twice for the correct length of time.  · Wash your hands after using the toilet, before and after preparing food, before eating food, after changing a diaper, cleaning a wound, caring for a sick person, and blowing your nose, coughing, or sneezing. You should also wash your hands after caring for someone who is sick, touching pet food, or treats, and touching an animal, or animal waste.  · You may use acetaminophen or NSAID medicines like ibuprofen or naproxen to control fever, unless another medicine was prescribed. If you have chronic liver or kidney disease or ever had a stomach ulcer or GI bleeding, talk with your doctor before using these medicines. Aspirin should never be used in anyone under 18 years of age who is ill with a fever. It may cause severe liver damage. Don't use NSAID medicines if you are already taking one for another condition (like arthritis) or are on aspirin (such as for heart disease, or after a stroke)  · Avoid tobacco and alcohol use, which may worsen your symptoms.  · If medicines for vomiting were prescribed, take as directed.  · Once vomiting stops, then follow these guidelines:  During " the first 12 to 24 hours follow the diet below:  · Fruit juices. Apple, grape juice, clear fruit drinks, and electrolyte replacement drinks.  · Beverages. Soft drinks without caffeine; mineral water (plain or flavored), decaffeinated tea and coffee.  · Soups. Clear broth, consommé and bouillon  · Desserts. Plain gelatin, popsicles and fruit juice bars. As you feel better, you may add 6-8 ounces of yogurt per day.  During the next 24 hours you may add the following to the above:  · Hot cereal, plain toast, bread, rolls, crackers  · Plain noodles, rice, mashed potatoes, chicken noodle or rice soup  · Unsweetened canned fruit (avoid pineapple), bananas  · Limit caffeine and chocolate. No spices or seasonings except salt.  During the next 24 hours:  Gradually resume a normal diet, as you feel better and your symptoms lessen.  Follow-up care  Follow up with your healthcare provider, or as advised.  When to seek medical advice  Call your healthcare provider right away if any of these occur:  · Constant right-sided lower abdominal pain or increasing general abdominal pain  · Continued vomiting (unable to keep liquids down) for 24 hours  · Frequent diarrhea (more than 5 times a day); blood (red or black color) or mucus in diarrhea  · Reduced urine output or extreme thirst  · Weakness, dizziness or fainting  · Unusually drowsy or confused  · Fever of 100.4°F (38°C) oral or higher, or as directed  · Yellow color of the eyes or skin  Date Last Reviewed: 11/16/2015 © 2000-2017 [employer]. [address] [place] [address], [place] [address]. All rights reserved. This information is not intended as a substitute for professional medical care. Always follow your healthcare professional's instructions.      If abdominal pain develops, fever, or unable to hold fluids go to the ER   none

## 2018-05-04 ENCOUNTER — INPATIENT (INPATIENT)
Facility: HOSPITAL | Age: 58
LOS: 5 days | Discharge: HOME | End: 2018-05-10
Attending: INTERNAL MEDICINE | Admitting: INTERNAL MEDICINE
Payer: COMMERCIAL

## 2018-05-04 VITALS
TEMPERATURE: 98 F | DIASTOLIC BLOOD PRESSURE: 119 MMHG | OXYGEN SATURATION: 97 % | RESPIRATION RATE: 18 BRPM | SYSTOLIC BLOOD PRESSURE: 182 MMHG | HEART RATE: 86 BPM

## 2018-05-04 DIAGNOSIS — E78.5 HYPERLIPIDEMIA, UNSPECIFIED: ICD-10-CM

## 2018-05-04 DIAGNOSIS — E11.9 TYPE 2 DIABETES MELLITUS WITHOUT COMPLICATIONS: ICD-10-CM

## 2018-05-04 DIAGNOSIS — R07.9 CHEST PAIN, UNSPECIFIED: ICD-10-CM

## 2018-05-04 DIAGNOSIS — I10 ESSENTIAL (PRIMARY) HYPERTENSION: ICD-10-CM

## 2018-05-04 LAB
ALBUMIN SERPL ELPH-MCNC: 4.5 G/DL — SIGNIFICANT CHANGE UP (ref 3.3–5)
ALP SERPL-CCNC: 106 U/L — SIGNIFICANT CHANGE UP (ref 40–120)
ALT FLD-CCNC: 27 U/L — SIGNIFICANT CHANGE UP (ref 4–41)
APTT BLD: 34.1 SEC — SIGNIFICANT CHANGE UP (ref 27.5–37.4)
AST SERPL-CCNC: 20 U/L — SIGNIFICANT CHANGE UP (ref 4–40)
B-OH-BUTYR SERPL-SCNC: 0.1 MMOL/L — SIGNIFICANT CHANGE UP (ref 0–0.4)
BASOPHILS # BLD AUTO: 0.04 K/UL — SIGNIFICANT CHANGE UP (ref 0–0.2)
BASOPHILS NFR BLD AUTO: 0.3 % — SIGNIFICANT CHANGE UP (ref 0–2)
BILIRUB SERPL-MCNC: 0.6 MG/DL — SIGNIFICANT CHANGE UP (ref 0.2–1.2)
BUN SERPL-MCNC: 22 MG/DL — SIGNIFICANT CHANGE UP (ref 7–23)
BUN SERPL-MCNC: 25 MG/DL — HIGH (ref 7–23)
CALCIUM SERPL-MCNC: 9.5 MG/DL — SIGNIFICANT CHANGE UP (ref 8.4–10.5)
CALCIUM SERPL-MCNC: 9.9 MG/DL — SIGNIFICANT CHANGE UP (ref 8.4–10.5)
CHLORIDE SERPL-SCNC: 100 MMOL/L — SIGNIFICANT CHANGE UP (ref 98–107)
CHLORIDE SERPL-SCNC: 96 MMOL/L — LOW (ref 98–107)
CK MB BLD-MCNC: 3.67 NG/ML — SIGNIFICANT CHANGE UP (ref 1–6.6)
CK MB BLD-MCNC: SIGNIFICANT CHANGE UP (ref 0–2.5)
CK SERPL-CCNC: 103 U/L — SIGNIFICANT CHANGE UP (ref 30–200)
CK SERPL-CCNC: 124 U/L — SIGNIFICANT CHANGE UP (ref 30–200)
CO2 SERPL-SCNC: 24 MMOL/L — SIGNIFICANT CHANGE UP (ref 22–31)
CO2 SERPL-SCNC: 25 MMOL/L — SIGNIFICANT CHANGE UP (ref 22–31)
CREAT SERPL-MCNC: 0.92 MG/DL — SIGNIFICANT CHANGE UP (ref 0.5–1.3)
CREAT SERPL-MCNC: 1 MG/DL — SIGNIFICANT CHANGE UP (ref 0.5–1.3)
EOSINOPHIL # BLD AUTO: 0.15 K/UL — SIGNIFICANT CHANGE UP (ref 0–0.5)
EOSINOPHIL NFR BLD AUTO: 1.1 % — SIGNIFICANT CHANGE UP (ref 0–6)
GLUCOSE BLDC GLUCOMTR-MCNC: 236 MG/DL — HIGH (ref 70–99)
GLUCOSE BLDC GLUCOMTR-MCNC: 278 MG/DL — HIGH (ref 70–99)
GLUCOSE SERPL-MCNC: 266 MG/DL — HIGH (ref 70–99)
GLUCOSE SERPL-MCNC: 443 MG/DL — HIGH (ref 70–99)
HCT VFR BLD CALC: 42.1 % — SIGNIFICANT CHANGE UP (ref 39–50)
HGB BLD-MCNC: 14.9 G/DL — SIGNIFICANT CHANGE UP (ref 13–17)
IMM GRANULOCYTES # BLD AUTO: 0.05 # — SIGNIFICANT CHANGE UP
IMM GRANULOCYTES NFR BLD AUTO: 0.4 % — SIGNIFICANT CHANGE UP (ref 0–1.5)
INR BLD: 0.99 — SIGNIFICANT CHANGE UP (ref 0.88–1.17)
LYMPHOCYTES # BLD AUTO: 0.98 K/UL — LOW (ref 1–3.3)
LYMPHOCYTES # BLD AUTO: 7.1 % — LOW (ref 13–44)
MCHC RBC-ENTMCNC: 29.4 PG — SIGNIFICANT CHANGE UP (ref 27–34)
MCHC RBC-ENTMCNC: 35.4 % — SIGNIFICANT CHANGE UP (ref 32–36)
MCV RBC AUTO: 83 FL — SIGNIFICANT CHANGE UP (ref 80–100)
MONOCYTES # BLD AUTO: 0.29 K/UL — SIGNIFICANT CHANGE UP (ref 0–0.9)
MONOCYTES NFR BLD AUTO: 2.1 % — SIGNIFICANT CHANGE UP (ref 2–14)
NEUTROPHILS # BLD AUTO: 12.2 K/UL — HIGH (ref 1.8–7.4)
NEUTROPHILS NFR BLD AUTO: 89 % — HIGH (ref 43–77)
NRBC # FLD: 0 — SIGNIFICANT CHANGE UP
PLATELET # BLD AUTO: 249 K/UL — SIGNIFICANT CHANGE UP (ref 150–400)
PMV BLD: 11.6 FL — SIGNIFICANT CHANGE UP (ref 7–13)
POTASSIUM SERPL-MCNC: 3.3 MMOL/L — LOW (ref 3.5–5.3)
POTASSIUM SERPL-MCNC: 3.6 MMOL/L — SIGNIFICANT CHANGE UP (ref 3.5–5.3)
POTASSIUM SERPL-SCNC: 3.3 MMOL/L — LOW (ref 3.5–5.3)
POTASSIUM SERPL-SCNC: 3.6 MMOL/L — SIGNIFICANT CHANGE UP (ref 3.5–5.3)
PROT SERPL-MCNC: 8 G/DL — SIGNIFICANT CHANGE UP (ref 6–8.3)
PROTHROM AB SERPL-ACNC: 11 SEC — SIGNIFICANT CHANGE UP (ref 9.8–13.1)
RBC # BLD: 5.07 M/UL — SIGNIFICANT CHANGE UP (ref 4.2–5.8)
RBC # FLD: 12.4 % — SIGNIFICANT CHANGE UP (ref 10.3–14.5)
SODIUM SERPL-SCNC: 135 MMOL/L — SIGNIFICANT CHANGE UP (ref 135–145)
SODIUM SERPL-SCNC: 138 MMOL/L — SIGNIFICANT CHANGE UP (ref 135–145)
TROPONIN T SERPL-MCNC: 0.1 NG/ML — HIGH (ref 0–0.06)
TROPONIN T SERPL-MCNC: 0.34 NG/ML — HIGH (ref 0–0.06)
WBC # BLD: 13.71 K/UL — HIGH (ref 3.8–10.5)
WBC # FLD AUTO: 13.71 K/UL — HIGH (ref 3.8–10.5)

## 2018-05-04 PROCEDURE — 93010 ELECTROCARDIOGRAM REPORT: CPT

## 2018-05-04 PROCEDURE — 71046 X-RAY EXAM CHEST 2 VIEWS: CPT | Mod: 26

## 2018-05-04 RX ORDER — HUMAN INSULIN 100 [IU]/ML
25 INJECTION, SUSPENSION SUBCUTANEOUS
Qty: 0 | Refills: 0 | Status: DISCONTINUED | OUTPATIENT
Start: 2018-05-04 | End: 2018-05-10

## 2018-05-04 RX ORDER — HUMAN INSULIN 100 [IU]/ML
15 INJECTION, SUSPENSION SUBCUTANEOUS AT BEDTIME
Qty: 0 | Refills: 0 | Status: DISCONTINUED | OUTPATIENT
Start: 2018-05-04 | End: 2018-05-10

## 2018-05-04 RX ORDER — METOPROLOL TARTRATE 50 MG
100 TABLET ORAL DAILY
Qty: 0 | Refills: 0 | Status: DISCONTINUED | OUTPATIENT
Start: 2018-05-04 | End: 2018-05-10

## 2018-05-04 RX ORDER — DEXTROSE 50 % IN WATER 50 %
25 SYRINGE (ML) INTRAVENOUS ONCE
Qty: 0 | Refills: 0 | Status: DISCONTINUED | OUTPATIENT
Start: 2018-05-04 | End: 2018-05-10

## 2018-05-04 RX ORDER — INSULIN LISPRO 100/ML
VIAL (ML) SUBCUTANEOUS
Qty: 0 | Refills: 0 | Status: DISCONTINUED | OUTPATIENT
Start: 2018-05-04 | End: 2018-05-10

## 2018-05-04 RX ORDER — ASPIRIN/CALCIUM CARB/MAGNESIUM 324 MG
81 TABLET ORAL DAILY
Qty: 0 | Refills: 0 | Status: DISCONTINUED | OUTPATIENT
Start: 2018-05-04 | End: 2018-05-10

## 2018-05-04 RX ORDER — AMLODIPINE BESYLATE 2.5 MG/1
10 TABLET ORAL DAILY
Qty: 0 | Refills: 0 | Status: DISCONTINUED | OUTPATIENT
Start: 2018-05-04 | End: 2018-05-10

## 2018-05-04 RX ORDER — DEXTROSE 50 % IN WATER 50 %
12.5 SYRINGE (ML) INTRAVENOUS ONCE
Qty: 0 | Refills: 0 | Status: DISCONTINUED | OUTPATIENT
Start: 2018-05-04 | End: 2018-05-10

## 2018-05-04 RX ORDER — GLUCAGON INJECTION, SOLUTION 0.5 MG/.1ML
1 INJECTION, SOLUTION SUBCUTANEOUS ONCE
Qty: 0 | Refills: 0 | Status: DISCONTINUED | OUTPATIENT
Start: 2018-05-04 | End: 2018-05-10

## 2018-05-04 RX ORDER — ATORVASTATIN CALCIUM 80 MG/1
40 TABLET, FILM COATED ORAL AT BEDTIME
Qty: 0 | Refills: 0 | Status: DISCONTINUED | OUTPATIENT
Start: 2018-05-04 | End: 2018-05-10

## 2018-05-04 RX ORDER — INSULIN LISPRO 100/ML
VIAL (ML) SUBCUTANEOUS AT BEDTIME
Qty: 0 | Refills: 0 | Status: DISCONTINUED | OUTPATIENT
Start: 2018-05-04 | End: 2018-05-10

## 2018-05-04 RX ORDER — HYDROCHLOROTHIAZIDE 25 MG
25 TABLET ORAL DAILY
Qty: 0 | Refills: 0 | Status: DISCONTINUED | OUTPATIENT
Start: 2018-05-04 | End: 2018-05-04

## 2018-05-04 RX ORDER — DEXTROSE 50 % IN WATER 50 %
1 SYRINGE (ML) INTRAVENOUS ONCE
Qty: 0 | Refills: 0 | Status: DISCONTINUED | OUTPATIENT
Start: 2018-05-04 | End: 2018-05-10

## 2018-05-04 RX ORDER — SODIUM CHLORIDE 9 MG/ML
1000 INJECTION, SOLUTION INTRAVENOUS
Qty: 0 | Refills: 0 | Status: DISCONTINUED | OUTPATIENT
Start: 2018-05-04 | End: 2018-05-10

## 2018-05-04 RX ORDER — SODIUM CHLORIDE 9 MG/ML
1000 INJECTION INTRAMUSCULAR; INTRAVENOUS; SUBCUTANEOUS ONCE
Qty: 0 | Refills: 0 | Status: COMPLETED | OUTPATIENT
Start: 2018-05-04 | End: 2018-05-04

## 2018-05-04 RX ORDER — ENOXAPARIN SODIUM 100 MG/ML
40 INJECTION SUBCUTANEOUS DAILY
Qty: 0 | Refills: 0 | Status: DISCONTINUED | OUTPATIENT
Start: 2018-05-04 | End: 2018-05-07

## 2018-05-04 RX ADMIN — SODIUM CHLORIDE 1000 MILLILITER(S): 9 INJECTION INTRAMUSCULAR; INTRAVENOUS; SUBCUTANEOUS at 18:56

## 2018-05-04 RX ADMIN — ATORVASTATIN CALCIUM 40 MILLIGRAM(S): 80 TABLET, FILM COATED ORAL at 21:07

## 2018-05-04 RX ADMIN — HUMAN INSULIN 15 UNIT(S): 100 INJECTION, SUSPENSION SUBCUTANEOUS at 22:10

## 2018-05-04 NOTE — ED PROVIDER NOTE - CARE PLAN
Principal Discharge DX:	Atypical chest pain Principal Discharge DX:	Chest pain  Secondary Diagnosis:	Elevated troponin

## 2018-05-04 NOTE — H&P ADULT - ATTENDING COMMENTS
Patient seen and examined, agree with above assessment and plan as transcribed above.    - patient admitted with chest pressure and ruled in for NSTEMI  - Cont heparin gtt  - Cont ASA, Statin BB  - Cath monday    Josué London MD, FACC

## 2018-05-04 NOTE — H&P ADULT - HISTORY OF PRESENT ILLNESS
Pt 56yo Active male with Hx of HTN (SBP baseline in 130)  DM (recent hospitalization to [facility] for hyperglycemia, and hx of DKA in a past) brought to [facility] by EMS for chest discomfort. Pt reports sudden onset of midsternal chest discomfort that at 1130 this mourning while taking out garbage. Pain was 8/10 in intensity, non radiating, non pleuritic with no associated SOB, N/V or diaphoresis. Pt reports with onset of chest discomfort he took one ASA and another dose of ASA was delivered by EMS. Pt reports that discomfort lasted for about one hour and gradually resolved. Pt denies having similar events in a past. Pt reports no cough, no fever or chills, no N/V, no Leg pain or swelling. Pt reports changes in exertional tolerance (he runs 30-40min three time per weeks with no symptoms) . Pt reports 7Lb Wt loss in past few month as well polyuria that he attributed to poorly controlled DM that lead him to hospitalization to [facility] (after discharge symptoms resolved.   Pt reports recent hospitalization to [facility] for elevated serum glucose  (not DKA) his glycemic regiment was adjusted and Pt was sent home on humulin 70/30 25 in am and 15 U in Pm since then according to Pt his daily FS are below 200.   Pt reports Hx of STRESS TEST 4y ago that was done routinely and according to Pt STRESS test was normal

## 2018-05-04 NOTE — H&P ADULT - PROBLEM SELECTOR PLAN 3
resume home meds A1C in am Humulin 70/30 not in formulary will change to NPH with same dose 25 in am 15 in pm F/U  A1C in am

## 2018-05-04 NOTE — H&P ADULT - PROBLEM SELECTOR PLAN 4
Current /81 droped from 182/119 without any intevrentions, will observe for now, if continue to be elevated will introduce ACE or ARB, (given Hx of DM), Pt also after 1L of IVF by ER will F/U BMP with Cl and BUN SCr and reassess if HCTZ can be restarted

## 2018-05-04 NOTE — ED ADULT TRIAGE NOTE - CHIEF COMPLAINT QUOTE
c/o midsternal chest discomfort after chopping wood today. pmhx, dm, htn, high cholesterol. denies sob, dizziness pt sts took b medication later than normal today

## 2018-05-04 NOTE — ED PROVIDER NOTE - ATTENDING CONTRIBUTION TO CARE
ED Attending (Jay GRUBER): I have personally performed a face to face bedside history and physical examination of this patient. I have discussed the history, examination, assessment and plan of management with the Physician Assistant. My findings include: 57yM w/pmhx DM, HTN, HLD presenting with mid sternal chest discomfort that began at 11am today. Pt states he was doing light yardwork, picking up sticks when he felt chest discomfort in the center of his chest. No radiation. Pain lasted 3 hours, no relief with 2 baby aspirin at home. Pt called EMS and was given 2 more aspirin. Reports pain is now resolved. Pt with normal stress test 4 years ago. Denies shortness of breath, palpitations, dizziness, nausea, vomiting, back pain, recent travel or illness, leg pain or swelling, hx of blood clots or any other concerns. No chest pain at present. On exam: VSS lungs, heart, pulses, abdomen, neuro, extremities, skin, all normal on exam. EKG NSR left axis no acute STTWC. IMP atypical chest discomfort. HEART score 3. Plan: EKG CXR labs CEx2 likely DC for outpatient work up including stress ED Attending (Jay GRUBER): I have personally performed a face to face bedside history and physical examination of this patient. I have discussed the history, examination, assessment and plan of management with the Physician Assistant. My findings include: 57yM w/pmhx DM, HTN, HLD presenting with mid sternal chest discomfort that began at 11am today. Pt states he was doing light yardwork, picking up sticks when he felt chest discomfort in the center of his chest. No radiation. Pain lasted 3 hours, no relief with 2 baby aspirin at home. Pt called EMS and was given 2 more aspirin. Reports pain is now resolved. Pt with normal stress test 4 years ago. Denies shortness of breath, palpitations, dizziness, nausea, vomiting, back pain, recent travel or illness, leg pain or swelling, hx of blood clots or any other concerns. No chest pain at present. On exam: VSS lungs, heart, pulses, abdomen, neuro, extremities, skin, all normal on exam. EKG NSR left axis no acute STTWC. IMP atypical chest discomfort. HEART score 3. Plan: EKG CXR labs CE

## 2018-05-04 NOTE — H&P ADULT - ASSESSMENT
Pt 56yo male with Hx of HTN DM admitted to TELE for C/P and elevated TROP of 0.1 ECG NSR no ST changes and at present Pt asymptomatic. Upon arrival to Er Pt was found with BP of 182/119 HR 86 RR 18 and 97% on RA. RPT Vital at 1800 revealed /81 HR 60 RR 17 and 100% on RA, afebrile. No intervention were done for BP, pressure improved by itself. CXR is clear. on blood work noted with WBC of 13 and BUN Cr of 25/1 with Cl 96 and Serum glucose of 443 (pt reports that elevated glucose and blood pressure is due to taking his morning medications to late.

## 2018-05-04 NOTE — ED PROVIDER NOTE - OBJECTIVE STATEMENT
57yM w/pmhx DM, HTN, HLD presenting with mid sternal chest discomfort that began at 11am today. Pt states he was doing light yardwork, picking up sticks when he felt chest discomfort in the center of his chest. No radiation. Pain lasted 3 hours, no relief with 2 baby aspirin at home. Pt called EMS and was given 2 more aspirin. Reports pain is now resolved. Pt with normal stress test 4 years ago. Denies shortness of breath, palpitations, dizziness, nausea, vomiting, back pain, recent travel or illness, leg pain or swelling, hx of blood clots or any other concerns.  No chest pain at present

## 2018-05-04 NOTE — ED PROVIDER NOTE - CHPI ED SYMPTOMS NEG
no vomiting/no back pain/no cough/no shortness of breath/no nausea/no syncope/no chills/no dizziness/no fever/no diaphoresis

## 2018-05-04 NOTE — ED PROVIDER NOTE - MEDICAL DECISION MAKING DETAILS
57yM w/pmhx DM, HTN, HLD p/w chest discomfort x 3 hours today which has since resolved. Pt took 4 baby aspirin (2at home 2 in EMS). Will check chest xray, cbc/cmp/trop. Will r/o ACS with 2 set troponin and have ot f/u with cardiology

## 2018-05-04 NOTE — ED PROVIDER NOTE - PROGRESS NOTE DETAILS
KRISTI Villalobos: Pts troponin 0.1, brought back to intake on monitor, repeat EKG without changes, spoke with cards fellow who noticed  listed in pts chart from 2015, pt reports he never saw . Cards fellow asked for us to reach out to [facility] to see if he would like to take this case, otherwise they will see pt. Awaiting callback from . Dr London called covering Dr Alexis (cardiology) Made aware of patient and positive Troponin. He wants patient admitted to him on Tele. He wants to hold off heparinization unless CP returns or CK MB is elevated (added) KRISTI Villalobos: Discussed admission with tele PA, aware CKMB is pending and plan for heparin if positive.

## 2018-05-04 NOTE — H&P ADULT - RS GEN PE MLT RESP DETAILS PC
normal/good air movement/clear to auscultation bilaterally/airway patent/breath sounds equal/respirations non-labored

## 2018-05-04 NOTE — H&P ADULT - GASTROINTESTINAL DETAILS
no bruit/no rebound tenderness/soft/normal/no masses palpable/bowel sounds normal/nontender/no guarding/no distention

## 2018-05-04 NOTE — H&P ADULT - FAMILY HISTORY
Father  Still living? Yes, Estimated age: Age Unknown  Family history of early CAD, Age at diagnosis: Age Unknown  Family history of high blood pressure, Age at diagnosis: Age Unknown     Mother  Still living? Unknown  Family history of high blood pressure, Age at diagnosis: Age Unknown

## 2018-05-05 LAB
ALBUMIN SERPL ELPH-MCNC: 3.9 G/DL — SIGNIFICANT CHANGE UP (ref 3.3–5)
ALP SERPL-CCNC: 86 U/L — SIGNIFICANT CHANGE UP (ref 40–120)
ALT FLD-CCNC: 24 U/L — SIGNIFICANT CHANGE UP (ref 4–41)
APTT BLD: 56 SEC — HIGH (ref 27.5–37.4)
APTT BLD: 68.7 SEC — HIGH (ref 27.5–37.4)
APTT BLD: 79.4 SEC — HIGH (ref 27.5–37.4)
AST SERPL-CCNC: 19 U/L — SIGNIFICANT CHANGE UP (ref 4–40)
BILIRUB SERPL-MCNC: 0.6 MG/DL — SIGNIFICANT CHANGE UP (ref 0.2–1.2)
BUN SERPL-MCNC: 17 MG/DL — SIGNIFICANT CHANGE UP (ref 7–23)
CALCIUM SERPL-MCNC: 8.7 MG/DL — SIGNIFICANT CHANGE UP (ref 8.4–10.5)
CHLORIDE SERPL-SCNC: 100 MMOL/L — SIGNIFICANT CHANGE UP (ref 98–107)
CHOLEST SERPL-MCNC: 187 MG/DL — SIGNIFICANT CHANGE UP (ref 120–199)
CK SERPL-CCNC: 120 U/L — SIGNIFICANT CHANGE UP (ref 30–200)
CO2 SERPL-SCNC: 24 MMOL/L — SIGNIFICANT CHANGE UP (ref 22–31)
CREAT SERPL-MCNC: 0.9 MG/DL — SIGNIFICANT CHANGE UP (ref 0.5–1.3)
GLUCOSE BLDC GLUCOMTR-MCNC: 103 MG/DL — HIGH (ref 70–99)
GLUCOSE BLDC GLUCOMTR-MCNC: 109 MG/DL — HIGH (ref 70–99)
GLUCOSE BLDC GLUCOMTR-MCNC: 162 MG/DL — HIGH (ref 70–99)
GLUCOSE BLDC GLUCOMTR-MCNC: 195 MG/DL — HIGH (ref 70–99)
GLUCOSE SERPL-MCNC: 209 MG/DL — HIGH (ref 70–99)
HCT VFR BLD CALC: 40.7 % — SIGNIFICANT CHANGE UP (ref 39–50)
HDLC SERPL-MCNC: 39 MG/DL — SIGNIFICANT CHANGE UP (ref 35–55)
HGB BLD-MCNC: 14.4 G/DL — SIGNIFICANT CHANGE UP (ref 13–17)
LIPID PNL WITH DIRECT LDL SERPL: 140 MG/DL — SIGNIFICANT CHANGE UP
MAGNESIUM SERPL-MCNC: 1.9 MG/DL — SIGNIFICANT CHANGE UP (ref 1.6–2.6)
MCHC RBC-ENTMCNC: 29.1 PG — SIGNIFICANT CHANGE UP (ref 27–34)
MCHC RBC-ENTMCNC: 35.4 % — SIGNIFICANT CHANGE UP (ref 32–36)
MCV RBC AUTO: 82.2 FL — SIGNIFICANT CHANGE UP (ref 80–100)
NRBC # FLD: 0 — SIGNIFICANT CHANGE UP
PHOSPHATE SERPL-MCNC: 2.7 MG/DL — SIGNIFICANT CHANGE UP (ref 2.5–4.5)
PLATELET # BLD AUTO: 248 K/UL — SIGNIFICANT CHANGE UP (ref 150–400)
PMV BLD: 11.8 FL — SIGNIFICANT CHANGE UP (ref 7–13)
POTASSIUM SERPL-MCNC: 3.2 MMOL/L — LOW (ref 3.5–5.3)
POTASSIUM SERPL-SCNC: 3.2 MMOL/L — LOW (ref 3.5–5.3)
PROT SERPL-MCNC: 6.8 G/DL — SIGNIFICANT CHANGE UP (ref 6–8.3)
RBC # BLD: 4.95 M/UL — SIGNIFICANT CHANGE UP (ref 4.2–5.8)
RBC # FLD: 12.5 % — SIGNIFICANT CHANGE UP (ref 10.3–14.5)
SODIUM SERPL-SCNC: 137 MMOL/L — SIGNIFICANT CHANGE UP (ref 135–145)
TRIGL SERPL-MCNC: 109 MG/DL — SIGNIFICANT CHANGE UP (ref 10–149)
TROPONIN T SERPL-MCNC: 0.17 NG/ML — HIGH (ref 0–0.06)
WBC # BLD: 8.04 K/UL — SIGNIFICANT CHANGE UP (ref 3.8–10.5)
WBC # FLD AUTO: 8.04 K/UL — SIGNIFICANT CHANGE UP (ref 3.8–10.5)

## 2018-05-05 RX ORDER — POTASSIUM CHLORIDE 20 MEQ
40 PACKET (EA) ORAL EVERY 4 HOURS
Qty: 0 | Refills: 0 | Status: COMPLETED | OUTPATIENT
Start: 2018-05-05 | End: 2018-05-05

## 2018-05-05 RX ORDER — HEPARIN SODIUM 5000 [USP'U]/ML
INJECTION INTRAVENOUS; SUBCUTANEOUS
Qty: 25000 | Refills: 0 | Status: DISCONTINUED | OUTPATIENT
Start: 2018-05-05 | End: 2018-05-08

## 2018-05-05 RX ORDER — HEPARIN SODIUM 5000 [USP'U]/ML
5000 INJECTION INTRAVENOUS; SUBCUTANEOUS EVERY 6 HOURS
Qty: 0 | Refills: 0 | Status: DISCONTINUED | OUTPATIENT
Start: 2018-05-05 | End: 2018-05-08

## 2018-05-05 RX ADMIN — Medication 1: at 08:47

## 2018-05-05 RX ADMIN — Medication 81 MILLIGRAM(S): at 11:09

## 2018-05-05 RX ADMIN — Medication 40 MILLIEQUIVALENT(S): at 12:35

## 2018-05-05 RX ADMIN — HEPARIN SODIUM 1000 UNIT(S)/HR: 5000 INJECTION INTRAVENOUS; SUBCUTANEOUS at 00:24

## 2018-05-05 RX ADMIN — Medication 40 MILLIEQUIVALENT(S): at 08:04

## 2018-05-05 RX ADMIN — ENOXAPARIN SODIUM 40 MILLIGRAM(S): 100 INJECTION SUBCUTANEOUS at 11:09

## 2018-05-05 RX ADMIN — HEPARIN SODIUM 1000 UNIT(S)/HR: 5000 INJECTION INTRAVENOUS; SUBCUTANEOUS at 08:02

## 2018-05-05 RX ADMIN — HEPARIN SODIUM 900 UNIT(S)/HR: 5000 INJECTION INTRAVENOUS; SUBCUTANEOUS at 21:18

## 2018-05-05 RX ADMIN — Medication 40 MILLIEQUIVALENT(S): at 05:30

## 2018-05-05 RX ADMIN — HUMAN INSULIN 15 UNIT(S): 100 INJECTION, SUSPENSION SUBCUTANEOUS at 22:34

## 2018-05-05 RX ADMIN — HUMAN INSULIN 25 UNIT(S): 100 INJECTION, SUSPENSION SUBCUTANEOUS at 08:47

## 2018-05-05 RX ADMIN — ATORVASTATIN CALCIUM 40 MILLIGRAM(S): 80 TABLET, FILM COATED ORAL at 21:18

## 2018-05-05 RX ADMIN — Medication 40 MILLIEQUIVALENT(S): at 00:24

## 2018-05-05 RX ADMIN — HEPARIN SODIUM 900 UNIT(S)/HR: 5000 INJECTION INTRAVENOUS; SUBCUTANEOUS at 14:38

## 2018-05-05 RX ADMIN — Medication 40 MILLIEQUIVALENT(S): at 17:22

## 2018-05-05 RX ADMIN — AMLODIPINE BESYLATE 10 MILLIGRAM(S): 2.5 TABLET ORAL at 05:30

## 2018-05-05 NOTE — CHART NOTE - NSCHARTNOTEFT_GEN_A_CORE
Patient admitted with chest pressure r/o ACS, pt's symptoms have now resolved,   CPK neg, Trop 0.10 > 0.36,   Initial EKG - NSR @ Non specific T wave abnormality  Repeat EKG - NSR @ 57 bpm with new T wave inversions in V4 - V6   Tele - Sinus Wilber in 50's      VITAL SIGNS:  Vital Signs Last 24 Hrs  T(C): 36.3 (04 May 2018 20:43), Max: 36.7 (04 May 2018 12:40)  T(F): 97.4 (04 May 2018 20:43), Max: 98.1 (04 May 2018 12:40)  HR: 69 (04 May 2018 20:43) (60 - 86)  BP: 130/80 (04 May 2018 20:43) (130/80 - 182/119)  RR: 17 (04 May 2018 20:43) (17 - 18)  SpO2: 98% (04 May 2018 20:43) (97% - 100%)    O:  Well developed, well nourished middle aged  Male found sleeping in bed in no acute distress. Patient was awakened from sleep was  alert and oriented x 3   Extremities: No / 1- 4 ++ pedal edema, + /- Calf tenderness    No Known Allergies    MEDICATIONS  (STANDING):  amLODIPine   Tablet 10 milliGRAM(s) Oral daily  aspirin enteric coated 81 milliGRAM(s) Oral daily  atorvastatin 40 milliGRAM(s) Oral at bedtime  dextrose 5%. 1000 milliLiter(s) (50 mL/Hr) IV Continuous <Continuous>  dextrose 50% Injectable 12.5 Gram(s) IV Push once  dextrose 50% Injectable 25 Gram(s) IV Push once  dextrose 50% Injectable 25 Gram(s) IV Push once  enoxaparin Injectable 40 milliGRAM(s) SubCutaneous daily  heparin  Infusion.  Unit(s)/Hr (10 mL/Hr) IV Continuous <Continuous>  insulin lispro (HumaLOG) corrective regimen sliding scale   SubCutaneous three times a day before meals  insulin lispro (HumaLOG) corrective regimen sliding scale   SubCutaneous at bedtime  insulin NPH human recombinant 25 Unit(s) SubCutaneous before breakfast  insulin NPH human recombinant 15 Unit(s) SubCutaneous at bedtime  metoprolol succinate  milliGRAM(s) Oral daily  potassium chloride    Tablet ER 40 milliEquivalent(s) Oral every 4 hours    MEDICATIONS  (PRN):  dextrose Gel 1 Dose(s) Oral once PRN Blood Glucose LESS THAN 70 milliGRAM(s)/deciliter  glucagon  Injectable 1 milliGRAM(s) IntraMuscular once PRN Glucose LESS THAN 70 milligrams/deciliter  heparin  Injectable 5000 Unit(s) IV Push every 6 hours PRN For aPTT less than 40                            14.9   13.71 )-----------( 249      ( 04 May 2018 15:10 )             42.1     05-04    138  |  100  |  22  ----------------------------<  266<H>  3.3<L>   |  25  |  0.92    Ca    9.5      04 May 2018 21:03    TPro  8.0  /  Alb  4.5  /  TBili  0.6  /  DBili  x   /  AST  20  /  ALT  27  /  AlkPhos  106  05-04    CARDIAC MARKERS ( 04 May 2018 21:03 )  x     / 0.34 ng/mL / 124 u/L / x     / x      CARDIAC MARKERS ( 04 May 2018 15:10 )  x     / 0.10 ng/mL / 103 u/L / 3.67 ng/mL / x            CAPILLARY BLOOD GLUCOSE      POCT Blood Glucose.: 236 mg/dL (04 May 2018 21:58)  POCT Blood Glucose.: 278 mg/dL (04 May 2018 18:04)      Assessment / Plan: 58 y/o male with h/o HTN, DM admitted with chest pressure r/o ACS now is asymptomatic, symptoms resolved. + Troponins with EKG changes - + NSTEMI - Start Hep gtt no bolus ACS protocol, Lipid profile in am, Echocardiogram ordered to evaluate LVEF, and wall motion abnormalities, plan for Cardiac Cath / NST   Above events and plan d/w Matthieu NP - [facility] on call   pt hemodynamically stable will continue to monitor Patient admitted with chest pressure r/o ACS, pt's symptoms have now resolved,   CPK neg, Trop 0.10 > 0.36,   Initial EKG - NSR @ Non specific T wave abnormality  Repeat EKG - NSR @ 57 bpm with new T wave inversions in V4 - V6   Tele - Sinus Wilber in 50's      VITAL SIGNS:  Vital Signs Last 24 Hrs  T(C): 36.3 (04 May 2018 20:43), Max: 36.7 (04 May 2018 12:40)  T(F): 97.4 (04 May 2018 20:43), Max: 98.1 (04 May 2018 12:40)  HR: 69 (04 May 2018 20:43) (60 - 86)  BP: 130/80 (04 May 2018 20:43) (130/80 - 182/119)  RR: 17 (04 May 2018 20:43) (17 - 18)  SpO2: 98% (04 May 2018 20:43) (97% - 100%)    O:  Well developed, well nourished middle aged  Male found sleeping in bed in no acute distress. Patient was awakened from sleep was  alert and oriented x 3   Extremities: No / 1- 4 ++ pedal edema, + /- Calf tenderness    No Known Allergies    MEDICATIONS  (STANDING):  amLODIPine   Tablet 10 milliGRAM(s) Oral daily  aspirin enteric coated 81 milliGRAM(s) Oral daily  atorvastatin 40 milliGRAM(s) Oral at bedtime  dextrose 5%. 1000 milliLiter(s) (50 mL/Hr) IV Continuous <Continuous>  dextrose 50% Injectable 12.5 Gram(s) IV Push once  dextrose 50% Injectable 25 Gram(s) IV Push once  dextrose 50% Injectable 25 Gram(s) IV Push once  enoxaparin Injectable 40 milliGRAM(s) SubCutaneous daily  heparin  Infusion.  Unit(s)/Hr (10 mL/Hr) IV Continuous <Continuous>  insulin lispro (HumaLOG) corrective regimen sliding scale   SubCutaneous three times a day before meals  insulin lispro (HumaLOG) corrective regimen sliding scale   SubCutaneous at bedtime  insulin NPH human recombinant 25 Unit(s) SubCutaneous before breakfast  insulin NPH human recombinant 15 Unit(s) SubCutaneous at bedtime  metoprolol succinate  milliGRAM(s) Oral daily  potassium chloride    Tablet ER 40 milliEquivalent(s) Oral every 4 hours    MEDICATIONS  (PRN):  dextrose Gel 1 Dose(s) Oral once PRN Blood Glucose LESS THAN 70 milliGRAM(s)/deciliter  glucagon  Injectable 1 milliGRAM(s) IntraMuscular once PRN Glucose LESS THAN 70 milligrams/deciliter  heparin  Injectable 5000 Unit(s) IV Push every 6 hours PRN For aPTT less than 40                            14.9   13.71 )-----------( 249      ( 04 May 2018 15:10 )             42.1     05-04    138  |  100  |  22  ----------------------------<  266<H>  3.3<L>   |  25  |  0.92    Ca    9.5      04 May 2018 21:03    TPro  8.0  /  Alb  4.5  /  TBili  0.6  /  DBili  x   /  AST  20  /  ALT  27  /  AlkPhos  106  05-04    CARDIAC MARKERS ( 04 May 2018 21:03 )  x     / 0.34 ng/mL / 124 u/L / x     / x      CARDIAC MARKERS ( 04 May 2018 15:10 )  x     / 0.10 ng/mL / 103 u/L / 3.67 ng/mL / x            CAPILLARY BLOOD GLUCOSE      POCT Blood Glucose.: 236 mg/dL (04 May 2018 21:58)  POCT Blood Glucose.: 278 mg/dL (04 May 2018 18:04)      Assessment / Plan: 58 y/o male with h/o HTN, DM admitted with chest pressure r/o ACS now is asymptomatic, symptoms resolved. + Troponins with EKG changes - + NSTEMI - Start Hep gtt no bolus ACS protocol, Lipid profile in am, Echocardiogram ordered to evaluate LVEF, and wall motion abnormalities, plan for Cardiac Cath / NST   Above events and plan d/w Matthieu NP - [facility] on call   pt hemodynamically stable will continue to monitor  addendum note:   pt noted to have K 3.3 - hypokalemia - K supplemented PO for K > 4.0

## 2018-05-06 LAB
APTT BLD: 65.7 SEC — HIGH (ref 27.5–37.4)
BUN SERPL-MCNC: 25 MG/DL — HIGH (ref 7–23)
CALCIUM SERPL-MCNC: 8.9 MG/DL — SIGNIFICANT CHANGE UP (ref 8.4–10.5)
CHLORIDE SERPL-SCNC: 103 MMOL/L — SIGNIFICANT CHANGE UP (ref 98–107)
CO2 SERPL-SCNC: 21 MMOL/L — LOW (ref 22–31)
CREAT SERPL-MCNC: 1.07 MG/DL — SIGNIFICANT CHANGE UP (ref 0.5–1.3)
GLUCOSE BLDC GLUCOMTR-MCNC: 108 MG/DL — HIGH (ref 70–99)
GLUCOSE BLDC GLUCOMTR-MCNC: 138 MG/DL — HIGH (ref 70–99)
GLUCOSE BLDC GLUCOMTR-MCNC: 168 MG/DL — HIGH (ref 70–99)
GLUCOSE BLDC GLUCOMTR-MCNC: 169 MG/DL — HIGH (ref 70–99)
GLUCOSE SERPL-MCNC: 133 MG/DL — HIGH (ref 70–99)
HCT VFR BLD CALC: 42.2 % — SIGNIFICANT CHANGE UP (ref 39–50)
HGB BLD-MCNC: 14.3 G/DL — SIGNIFICANT CHANGE UP (ref 13–17)
MAGNESIUM SERPL-MCNC: 2 MG/DL — SIGNIFICANT CHANGE UP (ref 1.6–2.6)
MCHC RBC-ENTMCNC: 28.8 PG — SIGNIFICANT CHANGE UP (ref 27–34)
MCHC RBC-ENTMCNC: 33.9 % — SIGNIFICANT CHANGE UP (ref 32–36)
MCV RBC AUTO: 85.1 FL — SIGNIFICANT CHANGE UP (ref 80–100)
NRBC # FLD: 0 — SIGNIFICANT CHANGE UP
PLATELET # BLD AUTO: 239 K/UL — SIGNIFICANT CHANGE UP (ref 150–400)
PMV BLD: 12.2 FL — SIGNIFICANT CHANGE UP (ref 7–13)
POTASSIUM SERPL-MCNC: 3.4 MMOL/L — LOW (ref 3.5–5.3)
POTASSIUM SERPL-SCNC: 3.4 MMOL/L — LOW (ref 3.5–5.3)
RBC # BLD: 4.96 M/UL — SIGNIFICANT CHANGE UP (ref 4.2–5.8)
RBC # FLD: 13 % — SIGNIFICANT CHANGE UP (ref 10.3–14.5)
SODIUM SERPL-SCNC: 139 MMOL/L — SIGNIFICANT CHANGE UP (ref 135–145)
WBC # BLD: 6.7 K/UL — SIGNIFICANT CHANGE UP (ref 3.8–10.5)
WBC # FLD AUTO: 6.7 K/UL — SIGNIFICANT CHANGE UP (ref 3.8–10.5)

## 2018-05-06 RX ORDER — POTASSIUM CHLORIDE 20 MEQ
20 PACKET (EA) ORAL
Qty: 0 | Refills: 0 | Status: COMPLETED | OUTPATIENT
Start: 2018-05-06 | End: 2018-05-06

## 2018-05-06 RX ADMIN — ATORVASTATIN CALCIUM 40 MILLIGRAM(S): 80 TABLET, FILM COATED ORAL at 22:03

## 2018-05-06 RX ADMIN — HUMAN INSULIN 25 UNIT(S): 100 INJECTION, SUSPENSION SUBCUTANEOUS at 09:07

## 2018-05-06 RX ADMIN — HUMAN INSULIN 15 UNIT(S): 100 INJECTION, SUSPENSION SUBCUTANEOUS at 22:25

## 2018-05-06 RX ADMIN — HEPARIN SODIUM 900 UNIT(S)/HR: 5000 INJECTION INTRAVENOUS; SUBCUTANEOUS at 04:17

## 2018-05-06 RX ADMIN — AMLODIPINE BESYLATE 10 MILLIGRAM(S): 2.5 TABLET ORAL at 05:17

## 2018-05-06 RX ADMIN — Medication 81 MILLIGRAM(S): at 11:17

## 2018-05-06 RX ADMIN — Medication 20 MILLIEQUIVALENT(S): at 12:58

## 2018-05-06 RX ADMIN — Medication 1: at 09:09

## 2018-05-06 RX ADMIN — Medication 20 MILLIEQUIVALENT(S): at 15:04

## 2018-05-06 RX ADMIN — Medication 20 MILLIEQUIVALENT(S): at 10:03

## 2018-05-06 RX ADMIN — ENOXAPARIN SODIUM 40 MILLIGRAM(S): 100 INJECTION SUBCUTANEOUS at 11:17

## 2018-05-07 LAB
APTT BLD: 51.8 SEC — HIGH (ref 27.5–37.4)
APTT BLD: 79.2 SEC — HIGH (ref 27.5–37.4)
APTT BLD: 93.2 SEC — HIGH (ref 27.5–37.4)
BUN SERPL-MCNC: 19 MG/DL — SIGNIFICANT CHANGE UP (ref 7–23)
CALCIUM SERPL-MCNC: 8.7 MG/DL — SIGNIFICANT CHANGE UP (ref 8.4–10.5)
CHLORIDE SERPL-SCNC: 105 MMOL/L — SIGNIFICANT CHANGE UP (ref 98–107)
CO2 SERPL-SCNC: 21 MMOL/L — LOW (ref 22–31)
CREAT SERPL-MCNC: 0.96 MG/DL — SIGNIFICANT CHANGE UP (ref 0.5–1.3)
GLUCOSE BLDC GLUCOMTR-MCNC: 104 MG/DL — HIGH (ref 70–99)
GLUCOSE BLDC GLUCOMTR-MCNC: 166 MG/DL — HIGH (ref 70–99)
GLUCOSE BLDC GLUCOMTR-MCNC: 172 MG/DL — HIGH (ref 70–99)
GLUCOSE BLDC GLUCOMTR-MCNC: 177 MG/DL — HIGH (ref 70–99)
GLUCOSE BLDC GLUCOMTR-MCNC: 93 MG/DL — SIGNIFICANT CHANGE UP (ref 70–99)
GLUCOSE SERPL-MCNC: 94 MG/DL — SIGNIFICANT CHANGE UP (ref 70–99)
HCT VFR BLD CALC: 40.8 % — SIGNIFICANT CHANGE UP (ref 39–50)
HGB BLD-MCNC: 14.1 G/DL — SIGNIFICANT CHANGE UP (ref 13–17)
MAGNESIUM SERPL-MCNC: 2.1 MG/DL — SIGNIFICANT CHANGE UP (ref 1.6–2.6)
MCHC RBC-ENTMCNC: 29.3 PG — SIGNIFICANT CHANGE UP (ref 27–34)
MCHC RBC-ENTMCNC: 34.6 % — SIGNIFICANT CHANGE UP (ref 32–36)
MCV RBC AUTO: 84.8 FL — SIGNIFICANT CHANGE UP (ref 80–100)
NRBC # FLD: 0 — SIGNIFICANT CHANGE UP
PLATELET # BLD AUTO: 235 K/UL — SIGNIFICANT CHANGE UP (ref 150–400)
PMV BLD: 12.1 FL — SIGNIFICANT CHANGE UP (ref 7–13)
POTASSIUM SERPL-MCNC: 3.6 MMOL/L — SIGNIFICANT CHANGE UP (ref 3.5–5.3)
POTASSIUM SERPL-SCNC: 3.6 MMOL/L — SIGNIFICANT CHANGE UP (ref 3.5–5.3)
RBC # BLD: 4.81 M/UL — SIGNIFICANT CHANGE UP (ref 4.2–5.8)
RBC # FLD: 12.9 % — SIGNIFICANT CHANGE UP (ref 10.3–14.5)
SODIUM SERPL-SCNC: 140 MMOL/L — SIGNIFICANT CHANGE UP (ref 135–145)
WBC # BLD: 7.18 K/UL — SIGNIFICANT CHANGE UP (ref 3.8–10.5)
WBC # FLD AUTO: 7.18 K/UL — SIGNIFICANT CHANGE UP (ref 3.8–10.5)

## 2018-05-07 RX ADMIN — Medication 1: at 17:49

## 2018-05-07 RX ADMIN — Medication 81 MILLIGRAM(S): at 14:05

## 2018-05-07 RX ADMIN — ENOXAPARIN SODIUM 40 MILLIGRAM(S): 100 INJECTION SUBCUTANEOUS at 14:05

## 2018-05-07 RX ADMIN — HEPARIN SODIUM 0 UNIT(S)/HR: 5000 INJECTION INTRAVENOUS; SUBCUTANEOUS at 21:45

## 2018-05-07 RX ADMIN — HEPARIN SODIUM 800 UNIT(S)/HR: 5000 INJECTION INTRAVENOUS; SUBCUTANEOUS at 22:15

## 2018-05-07 RX ADMIN — HEPARIN SODIUM 950 UNIT(S)/HR: 5000 INJECTION INTRAVENOUS; SUBCUTANEOUS at 14:54

## 2018-05-07 RX ADMIN — AMLODIPINE BESYLATE 10 MILLIGRAM(S): 2.5 TABLET ORAL at 05:42

## 2018-05-07 RX ADMIN — HUMAN INSULIN 15 UNIT(S): 100 INJECTION, SUSPENSION SUBCUTANEOUS at 21:44

## 2018-05-07 RX ADMIN — ATORVASTATIN CALCIUM 40 MILLIGRAM(S): 80 TABLET, FILM COATED ORAL at 21:44

## 2018-05-07 RX ADMIN — HEPARIN SODIUM 1050 UNIT(S)/HR: 5000 INJECTION INTRAVENOUS; SUBCUTANEOUS at 06:56

## 2018-05-07 NOTE — PROGRESS NOTE ADULT - ATTENDING COMMENTS
Patient seen and examined, agree with above assessment and plan as transcribed above.    - for cath    Josué London MD, FACC

## 2018-05-08 LAB
APTT BLD: 45.8 SEC — HIGH (ref 27.5–37.4)
BUN SERPL-MCNC: 22 MG/DL — SIGNIFICANT CHANGE UP (ref 7–23)
CALCIUM SERPL-MCNC: 9 MG/DL — SIGNIFICANT CHANGE UP (ref 8.4–10.5)
CHLORIDE SERPL-SCNC: 102 MMOL/L — SIGNIFICANT CHANGE UP (ref 98–107)
CO2 SERPL-SCNC: 19 MMOL/L — LOW (ref 22–31)
CREAT SERPL-MCNC: 1.01 MG/DL — SIGNIFICANT CHANGE UP (ref 0.5–1.3)
GLUCOSE BLDC GLUCOMTR-MCNC: 156 MG/DL — HIGH (ref 70–99)
GLUCOSE BLDC GLUCOMTR-MCNC: 157 MG/DL — HIGH (ref 70–99)
GLUCOSE BLDC GLUCOMTR-MCNC: 226 MG/DL — HIGH (ref 70–99)
GLUCOSE SERPL-MCNC: 142 MG/DL — HIGH (ref 70–99)
HCT VFR BLD CALC: 42.4 % — SIGNIFICANT CHANGE UP (ref 39–50)
HCT VFR BLD CALC: 45.1 % — SIGNIFICANT CHANGE UP (ref 39–50)
HGB BLD-MCNC: 14.2 G/DL — SIGNIFICANT CHANGE UP (ref 13–17)
HGB BLD-MCNC: 15.3 G/DL — SIGNIFICANT CHANGE UP (ref 13–17)
INR BLD: 1.04 — SIGNIFICANT CHANGE UP (ref 0.88–1.17)
MAGNESIUM SERPL-MCNC: 2.2 MG/DL — SIGNIFICANT CHANGE UP (ref 1.6–2.6)
MCHC RBC-ENTMCNC: 28.7 PG — SIGNIFICANT CHANGE UP (ref 27–34)
MCHC RBC-ENTMCNC: 28.7 PG — SIGNIFICANT CHANGE UP (ref 27–34)
MCHC RBC-ENTMCNC: 33.5 % — SIGNIFICANT CHANGE UP (ref 32–36)
MCHC RBC-ENTMCNC: 33.9 % — SIGNIFICANT CHANGE UP (ref 32–36)
MCV RBC AUTO: 84.5 FL — SIGNIFICANT CHANGE UP (ref 80–100)
MCV RBC AUTO: 85.8 FL — SIGNIFICANT CHANGE UP (ref 80–100)
NRBC # FLD: 0 — SIGNIFICANT CHANGE UP
NRBC # FLD: 0 — SIGNIFICANT CHANGE UP
PLATELET # BLD AUTO: 235 K/UL — SIGNIFICANT CHANGE UP (ref 150–400)
PLATELET # BLD AUTO: 236 K/UL — SIGNIFICANT CHANGE UP (ref 150–400)
PMV BLD: 11.4 FL — SIGNIFICANT CHANGE UP (ref 7–13)
PMV BLD: 12 FL — SIGNIFICANT CHANGE UP (ref 7–13)
POTASSIUM SERPL-MCNC: 3.8 MMOL/L — SIGNIFICANT CHANGE UP (ref 3.5–5.3)
POTASSIUM SERPL-SCNC: 3.8 MMOL/L — SIGNIFICANT CHANGE UP (ref 3.5–5.3)
PROTHROM AB SERPL-ACNC: 11.5 SEC — SIGNIFICANT CHANGE UP (ref 9.8–13.1)
RBC # BLD: 4.94 M/UL — SIGNIFICANT CHANGE UP (ref 4.2–5.8)
RBC # BLD: 5.34 M/UL — SIGNIFICANT CHANGE UP (ref 4.2–5.8)
RBC # FLD: 12.7 % — SIGNIFICANT CHANGE UP (ref 10.3–14.5)
RBC # FLD: 12.7 % — SIGNIFICANT CHANGE UP (ref 10.3–14.5)
SODIUM SERPL-SCNC: 136 MMOL/L — SIGNIFICANT CHANGE UP (ref 135–145)
WBC # BLD: 7.57 K/UL — SIGNIFICANT CHANGE UP (ref 3.8–10.5)
WBC # BLD: 7.76 K/UL — SIGNIFICANT CHANGE UP (ref 3.8–10.5)
WBC # FLD AUTO: 7.57 K/UL — SIGNIFICANT CHANGE UP (ref 3.8–10.5)
WBC # FLD AUTO: 7.76 K/UL — SIGNIFICANT CHANGE UP (ref 3.8–10.5)

## 2018-05-08 PROCEDURE — 93010 ELECTROCARDIOGRAM REPORT: CPT

## 2018-05-08 PROCEDURE — 93306 TTE W/DOPPLER COMPLETE: CPT | Mod: 26

## 2018-05-08 RX ORDER — HEPARIN SODIUM 5000 [USP'U]/ML
5000 INJECTION INTRAVENOUS; SUBCUTANEOUS EVERY 8 HOURS
Qty: 0 | Refills: 0 | Status: DISCONTINUED | OUTPATIENT
Start: 2018-05-09 | End: 2018-05-10

## 2018-05-08 RX ORDER — TICAGRELOR 90 MG/1
90 TABLET ORAL
Qty: 0 | Refills: 0 | Status: DISCONTINUED | OUTPATIENT
Start: 2018-05-09 | End: 2018-05-10

## 2018-05-08 RX ORDER — SODIUM CHLORIDE 9 MG/ML
500 INJECTION INTRAMUSCULAR; INTRAVENOUS; SUBCUTANEOUS
Qty: 0 | Refills: 0 | Status: DISCONTINUED | OUTPATIENT
Start: 2018-05-08 | End: 2018-05-09

## 2018-05-08 RX ADMIN — AMLODIPINE BESYLATE 10 MILLIGRAM(S): 2.5 TABLET ORAL at 05:26

## 2018-05-08 RX ADMIN — ATORVASTATIN CALCIUM 40 MILLIGRAM(S): 80 TABLET, FILM COATED ORAL at 22:20

## 2018-05-08 RX ADMIN — HEPARIN SODIUM 950 UNIT(S)/HR: 5000 INJECTION INTRAVENOUS; SUBCUTANEOUS at 08:50

## 2018-05-08 RX ADMIN — Medication 81 MILLIGRAM(S): at 12:26

## 2018-05-08 RX ADMIN — Medication 1: at 13:19

## 2018-05-08 RX ADMIN — Medication 1: at 09:41

## 2018-05-08 RX ADMIN — HUMAN INSULIN 15 UNIT(S): 100 INJECTION, SUSPENSION SUBCUTANEOUS at 22:51

## 2018-05-08 NOTE — CHART NOTE - NSCHARTNOTEFT_GEN_A_CORE
The patient is s/p cardiac cath pLAD stent x 1, was noted to have new TWI in V2 on ECG #1 post cath and deeper TWI in V2-V6.   The patient is asymptomatic  VSS.  Dr. Betancourt was made aware, he reviewed the ECG's post cath. As per Dr. Betancourt, since the patient is asymptomatic no need for any intervention at present, recommended close observation.

## 2018-05-08 NOTE — CHART NOTE - NSCHARTNOTEFT_GEN_A_CORE
HISTORY OF PRESENT ILLNESS:    Patient is a 57y old  Male who presents with a chief complaint of chest pain   HPI:  Pt 58yo Active male with Hx of HTN (SBP baseline in 130)  DM (recent hospitalization to [facility] for hyperglycemia, and hx of DKA in a past) brought to [facility] by EMS for chest discomfort. Pt reports sudden onset of midsternal chest discomfort while taking out garbage. Pain was 8/10 in intensity, non radiating, non pleuritic with no associated SOB, N/V or diaphoresis. Pt reports with onset of chest discomfort he took one ASA and another dose of ASA was delivered by EMS. Pt reports that discomfort lasted for about one hour and gradually resolved. Pt reports changes in exertional tolerance (he runs 30-40min three time per weeks with no symptoms) . Pt reports 7Lb Wt loss in past few month as well polyuria that he attributed to poorly controlled DM that lead him to hospitalization to [facility] (after discharge symptoms resolved.  Pt reports recent hospitalization to [facility] for elevated serum glucose  (not DKA) his glycemic regiment was adjusted and Pt was sent home on humulin 70/30 25 in am and 15 U in Pm since then according to Pt his daily FS are below 200. Pt reports Hx of STRESS TEST 4y ago that was done routinely and according to Pt STRESS test was normal (04 May 2018 19:04)    Admitted to TELE on heparin gtt for C/P and elevated TROP of 0.1. ECG NSR no ST changes. Pt asymptomatic. Upon arrival to ER, pt was found with BP of 182/119 HR 86 RR 18 and 97% on RA. RPT Vital at 1800 revealed /81 HR 60 RR 17 and 100% on RA. Patient went for LHC on 5/8/2018.  Cardiac cath revealed pLAD 90%=> stent x 1, OM 60%, RCA 70%, EF 55%, EDP 16. R radial access. Patient developed allergic reaction during cath (swollen lips, hives on anterior chest). Benadryl 25 mg IV x 1, Solu-cortef 100 mg IV x1, Pepcid 20 mg IV x 1 given in cath lab, with resolution on  symptoms. Patient denies any complaints. Patient was noted to have new TWI in V2 on ECG #1 post cath and deeper TWI in V2-V6, asymptomatic. Dr. Betancourt was made aware, no need for any intervention at present, recommended close observation.    Transthoracic Echocardiogram (05.08.18 @ 15:00) > EF 69%. Normal mitral valve. Minimal mitral regurgitation. Normal trileaflet aortic valve. Normal left ventricular internal dimensions and wall thicknesses. Endocardium not well visualized; grossly normal left ventricular systolic function. Normal right ventricular size and function.    Allergies  iodinated radiocontrast agents (Hives; Swelling)    PAST MEDICAL & SURGICAL HISTORY:  DKA (diabetic ketoacidoses)  HLD (hyperlipidemia)  HTN (hypertension)  Diabetes  No significant past surgical history    MEDICATIONS  amLODIPine   Tablet 10 milliGRAM(s) Oral daily  aspirin enteric coated 81 milliGRAM(s) Oral daily  metoprolol succinate  milliGRAM(s) Oral daily  atorvastatin 40 milliGRAM(s) Oral at bedtime  dextrose 50% Injectable 12.5 Gram(s) IV Push once  dextrose 50% Injectable 25 Gram(s) IV Push once  dextrose 50% Injectable 25 Gram(s) IV Push once  dextrose Gel 1 Dose(s) Oral once PRN  glucagon  Injectable 1 milliGRAM(s) IntraMuscular once PRN  insulin lispro (HumaLOG) corrective regimen sliding scale   SubCutaneous three times a day before meals  insulin lispro (HumaLOG) corrective regimen sliding scale   SubCutaneous at bedtime  insulin NPH human recombinant 25 Unit(s) SubCutaneous before breakfast  insulin NPH human recombinant 15 Unit(s) SubCutaneous at bedtime  dextrose 5%. 1000 milliLiter(s) IV Continuous <Continuous>  sodium chloride 0.9%. 500 milliLiter(s) IV Continuous <Continuous>    REVIEW OF SYSTEMS:  CONSTITUTIONAL: No fevers, No chills, No fatigue, No weight gain  EYES: No vision changes   ENT: No congestion, No ear pain, No sore throat.  NECK: No pain, No stiffness  RESPIRATORY: No shortness of breath, No cough, No wheezing, No hemoptysis  CARDIOVASCULAR: No chest pain. No palpitations, No CASE, No orthopnea, No paroxysmal nocturnal dyspnea, No pleuritic pain  GASTROINTESTINAL: No abdominal pain, No nausea, No vomiting, No hematemesis, No diarrhea No constipation. No melena  GENITOURINARY: No dysuria, No frequency, No incontinence, No hematuria  NEUROLOGICAL: No dizziness, No lightheadedness, No syncope, No LOC, No headache, No numbness or weakness  MUSCULOSKELETAL: No joint pain, No joint swelling.  PSYCHIATRIC: No anxiety, No depression  SKIN: No diaphoresis. No itching, No rashes, No pressure ulcers    All other review of systems is negative unless indicated above.    ICU Vital Signs Last 24 Hrs  T(C): 36.7 (08 May 2018 05:25), Max: 36.7 (08 May 2018 05:25)  T(F): 98 (08 May 2018 05:25), Max: 98 (08 May 2018 05:25)  HR: 55 (08 May 2018 05:25) (55 - 55)  BP: 122/75 (08 May 2018 05:25) (122/75 - 122/75)  BP(mean): --  ABP: --  ABP(mean): --  RR: 18 (08 May 2018 05:25) (18 - 18)  SpO2: 99% (08 May 2018 05:25) (99% - 99%)    PHYSICAL EXAM:  Appearance: NAD, no distress  HEENT:   Normal oral mucosa, PERRL, EOMI  Cardiovascular: Regular rate and rhythm, Normal S1 S2, No JVD, No murmurs, No edema  Respiratory: Lungs clear to auscultation. No rales, No rhonchi, No wheezing. No tenderness to palpation  Gastrointestinal:  Soft, Non-tender, + BS  Neurologic: Non-focal, A&Ox3  Skin: Warm and dry, No rashes, No ecchymoses, No cyanosis  Extremities: No clubbing, cyanosis or edema  Vascular: Peripheral pulses palpable 2+ bilaterally  Psychiatry: Mood & affect appropriate    LABORATORY VALUES	 	                    15.3   7.76  )-----------( 235      ( 08 May 2018 08:45 )             45.1     05-08    136  |  102  |  22  ----------------------------<  142<H>  3.8   |  19<L>  |  1.01  05-07    140  |  105  |  19  ----------------------------<  94  3.6   |  21<L>  |  0.96    Ca    9.0      08 May 2018 04:00  Ca    8.7      07 May 2018 05:05  Mg     2.2     05-08  Mg     2.1     05-07    Prothrombin Time, Plasma: 11.5 SEC (05-08 @ 07:00)    05-05 @ 06:10  Cholesterol, Serum - 187  Direct LDL- 140  HDL Cholesterol, Serum- 39  Triglycerides, Serum- 109  Hemoglobin A1C, Whole Blood: >15.5 % (04-05 @ 10:01)  POCT Blood Glucose.: 156 mg/dL (08 May 2018 12:31)    TELEMETRY: 	    ECG:  	  RADIOLOGY:  OTHER: 	    ASSESSMENT AND PLAN    58yo male with Hx of HTN, DM, hx of DKA in a past brought to [facility] by EMS for chest discomfort.  Admitted to telemetry with NSTEMI. S/p Cath today with pLAD 90%=> stent x 1, OM 60%, RCA 70%, EF 55%, EDP 16. Patient developed allergic reaction during cardiac cath, most likely IV dye allergy. Received Benadryl 25 mg IV, Solu-cortef 100 mg IV, Pepcid 20 mg IV. Transferred to CCU for monitoring.     PLAN          ##[id number] HISTORY OF PRESENT ILLNESS:    Patient is a 57y old  Male who presents with a chief complaint of chest pain   HPI:  Pt 58yo Active male with Hx of HTN (SBP baseline in 130)  DM (recent hospitalization to [facility] for hyperglycemia, and hx of DKA in a past) brought to [facility] by EMS for chest discomfort. Pt reports sudden onset of midsternal chest discomfort while taking out garbage. Pain was 8/10 in intensity, non radiating, non pleuritic with no associated SOB, N/V or diaphoresis. Pt reports with onset of chest discomfort he took one ASA and another dose of ASA was delivered by EMS. Pt reports that discomfort lasted for about one hour and gradually resolved. Pt reports changes in exertional tolerance (he runs 30-40min three time per weeks with no symptoms) . Pt reports 7Lb Wt loss in past few month as well polyuria that he attributed to poorly controlled DM that lead him to hospitalization to [facility] (after discharge symptoms resolved.  Pt reports recent hospitalization to [facility] for elevated serum glucose  (not DKA) his glycemic regiment was adjusted and Pt was sent home on humulin 70/30 25 in am and 15 U in Pm since then according to Pt his daily FS are below 200. Pt reports Hx of STRESS TEST 4y ago that was done routinely and according to Pt STRESS test was normal (04 May 2018 19:04)    Admitted to TELE on heparin gtt for C/P and elevated TROP of 0.1. ECG NSR no ST changes. Pt asymptomatic. Upon arrival to ER, pt was found with BP of 182/119 HR 86 RR 18 and 97% on RA. RPT Vital at 1800 revealed /81 HR 60 RR 17 and 100% on RA. Patient went for LHC on 5/8/2018.  Cardiac cath revealed pLAD 90%=> stent x 1, OM 60%, RCA 70%, EF 55%, EDP 16. R radial access. Patient developed allergic reaction during cath (swollen lips, hives on anterior chest). Benadryl 25 mg IV x 1, Solu-cortef 100 mg IV x1, Pepcid 20 mg IV x 1 given in cath lab, with resolution on  symptoms. Patient denies any complaints. Patient was noted to have new TWI in V2 on ECG #1 post cath and deeper TWI in V2-V6, asymptomatic. Dr. Betancourt was made aware, no need for any intervention at present, recommended close observation.    Transthoracic Echocardiogram (05.08.18 @ 15:00) > EF 69%. Normal mitral valve. Minimal mitral regurgitation. Normal trileaflet aortic valve. Normal left ventricular internal dimensions and wall thicknesses. Endocardium not well visualized; grossly normal left ventricular systolic function. Normal right ventricular size and function.    Allergies  iodinated radiocontrast agents (Hives; Swelling)    PAST MEDICAL & SURGICAL HISTORY:  DKA (diabetic ketoacidoses)  HLD (hyperlipidemia)  HTN (hypertension)  Diabetes  No significant past surgical history    MEDICATIONS  amLODIPine   Tablet 10 milliGRAM(s) Oral daily  aspirin enteric coated 81 milliGRAM(s) Oral daily  metoprolol succinate  milliGRAM(s) Oral daily  atorvastatin 40 milliGRAM(s) Oral at bedtime  dextrose 50% Injectable 12.5 Gram(s) IV Push once  dextrose 50% Injectable 25 Gram(s) IV Push once  dextrose 50% Injectable 25 Gram(s) IV Push once  dextrose Gel 1 Dose(s) Oral once PRN  glucagon  Injectable 1 milliGRAM(s) IntraMuscular once PRN  insulin lispro (HumaLOG) corrective regimen sliding scale   SubCutaneous three times a day before meals  insulin lispro (HumaLOG) corrective regimen sliding scale   SubCutaneous at bedtime  insulin NPH human recombinant 25 Unit(s) SubCutaneous before breakfast  insulin NPH human recombinant 15 Unit(s) SubCutaneous at bedtime  dextrose 5%. 1000 milliLiter(s) IV Continuous <Continuous>  sodium chloride 0.9%. 500 milliLiter(s) IV Continuous <Continuous>    REVIEW OF SYSTEMS:  CONSTITUTIONAL: No fevers, No chills, No fatigue, No weight gain  EYES: No vision changes   ENT: No congestion, No ear pain, No sore throat.  NECK: No pain, No stiffness  RESPIRATORY: No shortness of breath, No cough, No wheezing, No hemoptysis  CARDIOVASCULAR: No chest pain. No palpitations, No CASE, No orthopnea, No paroxysmal nocturnal dyspnea, No pleuritic pain  GASTROINTESTINAL: No abdominal pain, No nausea, No vomiting, No hematemesis, No diarrhea No constipation. No melena  GENITOURINARY: No dysuria, No frequency, No incontinence, No hematuria  NEUROLOGICAL: No dizziness, No lightheadedness, No syncope, No LOC, No headache, No numbness or weakness  MUSCULOSKELETAL: No joint pain, No joint swelling.  PSYCHIATRIC: No anxiety, No depression  SKIN: No diaphoresis. No itching, No rashes, No pressure ulcers    All other review of systems is negative unless indicated above.    ICU Vital Signs Last 24 Hrs  T(C): 36.7 (08 May 2018 05:25), Max: 36.7 (08 May 2018 05:25)  T(F): 98 (08 May 2018 05:25), Max: 98 (08 May 2018 05:25)  HR: 55 (08 May 2018 05:25) (55 - 55)  BP: 122/75 (08 May 2018 05:25) (122/75 - 122/75)  BP(mean): --  ABP: --  ABP(mean): --  RR: 18 (08 May 2018 05:25) (18 - 18)  SpO2: 99% (08 May 2018 05:25) (99% - 99%)    PHYSICAL EXAM:  Appearance: NAD, no distress  HEENT:   Normal oral mucosa, PERRL, EOMI  Cardiovascular: Regular rate and rhythm, Normal S1 S2, No JVD, No murmurs, No edema  Respiratory: Lungs clear to auscultation. No rales, No rhonchi, No wheezing. No tenderness to palpation  Gastrointestinal:  Soft, Non-tender, + BS  Neurologic: Non-focal, A&Ox3  Skin: Warm and dry, No rashes, No ecchymoses, No cyanosis  Extremities: No clubbing, cyanosis or edema  Vascular: Peripheral pulses palpable 2+ bilaterally  Psychiatry: Mood & affect appropriate    LABORATORY VALUES	 	                    15.3   7.76  )-----------( 235      ( 08 May 2018 08:45 )             45.1     05-08    136  |  102  |  22  ----------------------------<  142<H>  3.8   |  19<L>  |  1.01  05-07    140  |  105  |  19  ----------------------------<  94  3.6   |  21<L>  |  0.96    Ca    9.0      08 May 2018 04:00  Ca    8.7      07 May 2018 05:05  Mg     2.2     05-08  Mg     2.1     05-07    Prothrombin Time, Plasma: 11.5 SEC (05-08 @ 07:00)    05-05 @ 06:10  Cholesterol, Serum - 187  Direct LDL- 140  HDL Cholesterol, Serum- 39  Triglycerides, Serum- 109  Hemoglobin A1C, Whole Blood: >15.5 % (04-05 @ 10:01)  POCT Blood Glucose.: 156 mg/dL (08 May 2018 12:31)    TELEMETRY: 	    ECG:  	  RADIOLOGY:  OTHER: 	    ASSESSMENT AND PLAN    58yo male with Hx of HTN, DM, hx of DKA in a past brought to [facility] by EMS for chest discomfort.  Admitted to telemetry with NSTEMI. S/p Cath today with pLAD 90%=> stent x 1, OM 60%, RCA 70%, EF 55%, EDP 16. Patient developed allergic reaction during cardiac cath, most likely IV dye allergy. Received Benadryl 25 mg IV, Solu-cortef 100 mg IV, Pepcid 20 mg IV. Transferred to CCU for monitoring.     PLAN    1. NSTEMI  Patient came to ER with c/o chest pain. CE positive. S/p cath with pLAD 90%=> stent x 1, OM 60%, RCA 70%, EF 55%, EDP 16.   Transfer to CCU  Assess for resolution of chest pain and recurrence in chest pain.   Serial EKG to assess for resolution of ST changes  Monitor vital signs to monitor hemodynamic stability  Continuous cardiac monitoring to monitor for arrhythmias  Trend cardiac enzymes  Aspirin 81 PO daily, Brilinta 90 BID, Lipitor 80 mg PO daily  Continue Toprol XL  Low fat DASH diet  ECHO: 2D ECHO reviewed    2. HTN  Patient with h/o hypertension and is on amlodipine and Toprol  mg   DASH diet  Monitor blood pressure    3. DM   Humulin 70/30 not in formulary and is on NPH with same dose 25 in am 15 in pm F/U  A1C in am.     4. HLD  Patient has h/o hyperlipidemia and is on Lipitor  Lipid panel in am  Continue statin therapy with Lipitor 40 mg PO hs  Low fat diet        ##[id number] HISTORY OF PRESENT ILLNESS:    Patient is a 57y old  Male who presents with a chief complaint of chest pain   HPI:  Pt 56yo Active male with Hx of HTN (SBP baseline in 130)  DM (recent hospitalization to [facility] for hyperglycemia, and hx of DKA in a past) brought to [facility] by EMS for chest discomfort. Pt reports sudden onset of midsternal chest discomfort while taking out garbage. Pain was 8/10 in intensity, non radiating, non pleuritic with no associated SOB, N/V or diaphoresis. Pt reports with onset of chest discomfort he took one ASA and another dose of ASA was delivered by EMS. Pt reports that discomfort lasted for about one hour and gradually resolved. Pt reports changes in exertional tolerance (he runs 30-40min three time per weeks with no symptoms) . Pt reports 7Lb Wt loss in past few month as well polyuria that he attributed to poorly controlled DM that lead him to hospitalization to [facility] (after discharge symptoms resolved.  Pt reports recent hospitalization to [facility] for elevated serum glucose  (not DKA) his glycemic regiment was adjusted and Pt was sent home on humulin 70/30 25 in am and 15 U in Pm since then according to Pt his daily FS are below 200. Pt reports Hx of STRESS TEST 4y ago that was done routinely and according to Pt STRESS test was normal (04 May 2018 19:04)    Admitted to TELE on heparin gtt for C/P and elevated TROP of 0.1. ECG NSR no ST changes. Pt asymptomatic. Upon arrival to ER, pt was found with BP of 182/119 HR 86 RR 18 and 97% on RA. RPT Vital at 1800 revealed /81 HR 60 RR 17 and 100% on RA. Patient went for LHC on 5/8/2018.  Cardiac cath revealed pLAD 90%=> stent x 1, OM 60%, RCA 70%, EF 55%, EDP 16. R radial access. Patient developed allergic reaction during cath (swollen lips, hives on anterior chest). Benadryl 25 mg IV x 1, Solu-cortef 100 mg IV x1, Pepcid 20 mg IV x 1 given in cath lab, with resolution on  symptoms. Patient denies any complaints. Patient was noted to have new TWI in V2 on ECG #1 post cath and deeper TWI in V2-V6, asymptomatic. Dr. Betancourt was made aware, no need for any intervention at present, recommended close observation.    Transthoracic Echocardiogram (05.08.18 @ 15:00) > EF 69%. Normal mitral valve. Minimal mitral regurgitation. Normal trileaflet aortic valve. Normal left ventricular internal dimensions and wall thicknesses. Endocardium not well visualized; grossly normal left ventricular systolic function. Normal right ventricular size and function.    Allergies  iodinated radiocontrast agents (Hives; Swelling)    PAST MEDICAL & SURGICAL HISTORY:  DKA (diabetic ketoacidoses)  HLD (hyperlipidemia)  HTN (hypertension)  Diabetes  No significant past surgical history    MEDICATIONS  amLODIPine   Tablet 10 milliGRAM(s) Oral daily  aspirin enteric coated 81 milliGRAM(s) Oral daily  metoprolol succinate  milliGRAM(s) Oral daily  atorvastatin 40 milliGRAM(s) Oral at bedtime  dextrose 50% Injectable 12.5 Gram(s) IV Push once  dextrose 50% Injectable 25 Gram(s) IV Push once  dextrose 50% Injectable 25 Gram(s) IV Push once  dextrose Gel 1 Dose(s) Oral once PRN  glucagon  Injectable 1 milliGRAM(s) IntraMuscular once PRN  insulin lispro (HumaLOG) corrective regimen sliding scale   SubCutaneous three times a day before meals  insulin lispro (HumaLOG) corrective regimen sliding scale   SubCutaneous at bedtime  insulin NPH human recombinant 25 Unit(s) SubCutaneous before breakfast  insulin NPH human recombinant 15 Unit(s) SubCutaneous at bedtime  dextrose 5%. 1000 milliLiter(s) IV Continuous <Continuous>  sodium chloride 0.9%. 500 milliLiter(s) IV Continuous <Continuous>    REVIEW OF SYSTEMS:  CONSTITUTIONAL: No fevers, No chills, No fatigue, No weight gain  EYES: No vision changes   ENT: No congestion, No ear pain, No sore throat.  NECK: No pain, No stiffness  RESPIRATORY: No shortness of breath, No cough, No wheezing, No hemoptysis  CARDIOVASCULAR: No chest pain. No palpitations, No CASE, No orthopnea, No paroxysmal nocturnal dyspnea, No pleuritic pain  GASTROINTESTINAL: No abdominal pain, No nausea, No vomiting, No hematemesis, No diarrhea No constipation. No melena  GENITOURINARY: No dysuria, No frequency, No incontinence, No hematuria  NEUROLOGICAL: No dizziness, No lightheadedness, No syncope, No LOC, No headache, No numbness or weakness  MUSCULOSKELETAL: No joint pain, No joint swelling.  PSYCHIATRIC: No anxiety, No depression  SKIN: No diaphoresis. No itching, No rashes, No pressure ulcers    All other review of systems is negative unless indicated above.    ICU Vital Signs Last 24 Hrs  T(C): 36.7 (08 May 2018 05:25), Max: 36.7 (08 May 2018 05:25)  T(F): 98 (08 May 2018 05:25), Max: 98 (08 May 2018 05:25)  HR: 55 (08 May 2018 05:25) (55 - 55)  BP: 122/75 (08 May 2018 05:25) (122/75 - 122/75)  BP(mean): --  ABP: --  ABP(mean): --  RR: 18 (08 May 2018 05:25) (18 - 18)  SpO2: 99% (08 May 2018 05:25) (99% - 99%)    PHYSICAL EXAM:  Appearance: NAD, no distress  HEENT:   Normal oral mucosa, PERRL, EOMI  Cardiovascular: Regular rate and rhythm, Normal S1 S2, No JVD, No murmurs, No edema  Respiratory: Lungs clear to auscultation. No rales, No rhonchi, No wheezing. No tenderness to palpation  Gastrointestinal:  Soft, Non-tender, + BS  Neurologic: Non-focal, A&Ox3  Skin: Warm and dry, No rashes, No ecchymoses, No cyanosis  Extremities: No clubbing, cyanosis or edema  Vascular: Peripheral pulses palpable 2+ bilaterally  Psychiatry: Mood & affect appropriate    LABORATORY VALUES	 	                    15.3   7.76  )-----------( 235      ( 08 May 2018 08:45 )             45.1     05-08    136  |  102  |  22  ----------------------------<  142<H>  3.8   |  19<L>  |  1.01  05-07    140  |  105  |  19  ----------------------------<  94  3.6   |  21<L>  |  0.96    Ca    9.0      08 May 2018 04:00  Ca    8.7      07 May 2018 05:05  Mg     2.2     05-08  Mg     2.1     05-07    Prothrombin Time, Plasma: 11.5 SEC (05-08 @ 07:00)    05-05 @ 06:10  Cholesterol, Serum - 187  Direct LDL- 140  HDL Cholesterol, Serum- 39  Triglycerides, Serum- 109  Hemoglobin A1C, Whole Blood: >15.5 % (04-05 @ 10:01)  POCT Blood Glucose.: 156 mg/dL (08 May 2018 12:31)    TELEMETRY: 	    ECG:  	  RADIOLOGY:  OTHER: 	    ASSESSMENT AND PLAN    56yo male with Hx of HTN, DM, hx of DKA in a past brought to [facility] by EMS for chest discomfort.  Admitted to telemetry with NSTEMI. S/p Cath today with pLAD 90%=> stent x 1, OM 60%, RCA 70%, EF 55%, EDP 16. Patient developed allergic reaction during cardiac cath, most likely IV dye allergy. Received Benadryl 25 mg IV, Solu-cortef 100 mg IV, Pepcid 20 mg IV. Transferred to CCU for monitoring.     PLAN    1. NSTEMI  Patient came to ER with c/o chest pain. CE positive. S/p cath with pLAD 90%=> stent x 1, OM 60%, RCA 70%, EF 55%, EDP 16.   Transfer to CCU  Assess for resolution of chest pain and recurrence in chest pain.   Serial EKG to assess for resolution of ST changes  Monitor vital signs to monitor hemodynamic stability  Continuous cardiac monitoring to monitor for arrhythmias  Prednisone maintenance dose for allergic reaction  Trend cardiac enzymes  Aspirin 81 PO daily, Brilinta 90 BID, Lipitor 80 mg PO daily  Continue Toprol XL  Low fat DASH diet  ECHO: 2D ECHO reviewed    2. HTN  Patient with h/o hypertension and is on amlodipine and Toprol  mg   DASH diet  Monitor blood pressure    3. DM   Humulin 70/30 not in formulary and is on NPH with same dose 25 in am 15 in pm F/U  A1C in am.     4. HLD  Patient has h/o hyperlipidemia and is on Lipitor  Lipid panel in am  Continue statin therapy with Lipitor 40 mg PO hs  Low fat diet        ##[id number]

## 2018-05-08 NOTE — CHART NOTE - NSCHARTNOTEFT_GEN_A_CORE
The patient is s/p cardiac cath, developed allergic reaction during cath (swollen lips, hives on anterior chest).  VSS  As per cathlab RN Benadryl 25 mg IV x 1, Solucortef 100 mg IV x1, Pepcid 20 mf IV x 1 given in cathlab, ordered by Dr. Mandujano, with resolution on  symptoms.  Upon arrival to cardiac cath/recovery, the patient denies any complaints, claims that feels better. Denies wheezing.  Heart - HR - reg, + S1, S2  Lung - clear to auscultation  Abdomen - soft, non tender.  R radial access site - hemoband is in place, no hematoma.  Tele PA made aware to  continue monitoring.

## 2018-05-08 NOTE — PROGRESS NOTE ADULT - ATTENDING COMMENTS
Patient seen and examined.  Agree with above.   -Cath today to evaluate NSTEMI    Heather Mandujano MD

## 2018-05-09 DIAGNOSIS — T50.8X5A ADVERSE EFFECT OF DIAGNOSTIC AGENTS, INITIAL ENCOUNTER: ICD-10-CM

## 2018-05-09 DIAGNOSIS — Z29.9 ENCOUNTER FOR PROPHYLACTIC MEASURES, UNSPECIFIED: ICD-10-CM

## 2018-05-09 DIAGNOSIS — I21.4 NON-ST ELEVATION (NSTEMI) MYOCARDIAL INFARCTION: ICD-10-CM

## 2018-05-09 LAB
BUN SERPL-MCNC: 26 MG/DL — HIGH (ref 7–23)
CALCIUM SERPL-MCNC: 8.7 MG/DL — SIGNIFICANT CHANGE UP (ref 8.4–10.5)
CHLORIDE SERPL-SCNC: 102 MMOL/L — SIGNIFICANT CHANGE UP (ref 98–107)
CK MB BLD-MCNC: 3.53 NG/ML — SIGNIFICANT CHANGE UP (ref 1–6.6)
CK SERPL-CCNC: 62 U/L — SIGNIFICANT CHANGE UP (ref 30–200)
CO2 SERPL-SCNC: 22 MMOL/L — SIGNIFICANT CHANGE UP (ref 22–31)
CREAT SERPL-MCNC: 1.28 MG/DL — SIGNIFICANT CHANGE UP (ref 0.5–1.3)
GLUCOSE BLDC GLUCOMTR-MCNC: 120 MG/DL — HIGH (ref 70–99)
GLUCOSE BLDC GLUCOMTR-MCNC: 215 MG/DL — HIGH (ref 70–99)
GLUCOSE BLDC GLUCOMTR-MCNC: 231 MG/DL — HIGH (ref 70–99)
GLUCOSE BLDC GLUCOMTR-MCNC: 237 MG/DL — HIGH (ref 70–99)
GLUCOSE SERPL-MCNC: 136 MG/DL — HIGH (ref 70–99)
HCT VFR BLD CALC: 43.7 % — SIGNIFICANT CHANGE UP (ref 39–50)
HGB BLD-MCNC: 14.7 G/DL — SIGNIFICANT CHANGE UP (ref 13–17)
MAGNESIUM SERPL-MCNC: 2.2 MG/DL — SIGNIFICANT CHANGE UP (ref 1.6–2.6)
MCHC RBC-ENTMCNC: 29.1 PG — SIGNIFICANT CHANGE UP (ref 27–34)
MCHC RBC-ENTMCNC: 33.6 % — SIGNIFICANT CHANGE UP (ref 32–36)
MCV RBC AUTO: 86.5 FL — SIGNIFICANT CHANGE UP (ref 80–100)
NRBC # FLD: 0 — SIGNIFICANT CHANGE UP
PLATELET # BLD AUTO: 245 K/UL — SIGNIFICANT CHANGE UP (ref 150–400)
PMV BLD: 12 FL — SIGNIFICANT CHANGE UP (ref 7–13)
POTASSIUM SERPL-MCNC: 3.9 MMOL/L — SIGNIFICANT CHANGE UP (ref 3.5–5.3)
POTASSIUM SERPL-SCNC: 3.9 MMOL/L — SIGNIFICANT CHANGE UP (ref 3.5–5.3)
RBC # BLD: 5.05 M/UL — SIGNIFICANT CHANGE UP (ref 4.2–5.8)
RBC # FLD: 12.8 % — SIGNIFICANT CHANGE UP (ref 10.3–14.5)
SODIUM SERPL-SCNC: 135 MMOL/L — SIGNIFICANT CHANGE UP (ref 135–145)
TROPONIN T SERPL-MCNC: < 0.06 NG/ML — SIGNIFICANT CHANGE UP (ref 0–0.06)
WBC # BLD: 8.64 K/UL — SIGNIFICANT CHANGE UP (ref 3.8–10.5)
WBC # FLD AUTO: 8.64 K/UL — SIGNIFICANT CHANGE UP (ref 3.8–10.5)

## 2018-05-09 PROCEDURE — 99223 1ST HOSP IP/OBS HIGH 75: CPT | Mod: GC

## 2018-05-09 RX ORDER — POTASSIUM CHLORIDE 20 MEQ
20 PACKET (EA) ORAL ONCE
Qty: 0 | Refills: 0 | Status: COMPLETED | OUTPATIENT
Start: 2018-05-09 | End: 2018-05-09

## 2018-05-09 RX ADMIN — HUMAN INSULIN 15 UNIT(S): 100 INJECTION, SUSPENSION SUBCUTANEOUS at 22:31

## 2018-05-09 RX ADMIN — Medication 40 MILLIGRAM(S): at 06:40

## 2018-05-09 RX ADMIN — ATORVASTATIN CALCIUM 40 MILLIGRAM(S): 80 TABLET, FILM COATED ORAL at 22:32

## 2018-05-09 RX ADMIN — TICAGRELOR 90 MILLIGRAM(S): 90 TABLET ORAL at 18:39

## 2018-05-09 RX ADMIN — Medication 81 MILLIGRAM(S): at 12:40

## 2018-05-09 RX ADMIN — HEPARIN SODIUM 5000 UNIT(S): 5000 INJECTION INTRAVENOUS; SUBCUTANEOUS at 22:32

## 2018-05-09 RX ADMIN — Medication 20 MILLIEQUIVALENT(S): at 10:23

## 2018-05-09 RX ADMIN — Medication 40 MILLIGRAM(S): at 12:40

## 2018-05-09 RX ADMIN — Medication 100 MILLIGRAM(S): at 06:40

## 2018-05-09 RX ADMIN — Medication 2: at 18:39

## 2018-05-09 RX ADMIN — HEPARIN SODIUM 5000 UNIT(S): 5000 INJECTION INTRAVENOUS; SUBCUTANEOUS at 06:40

## 2018-05-09 RX ADMIN — AMLODIPINE BESYLATE 10 MILLIGRAM(S): 2.5 TABLET ORAL at 06:40

## 2018-05-09 RX ADMIN — HUMAN INSULIN 25 UNIT(S): 100 INJECTION, SUSPENSION SUBCUTANEOUS at 09:23

## 2018-05-09 RX ADMIN — HEPARIN SODIUM 5000 UNIT(S): 5000 INJECTION INTRAVENOUS; SUBCUTANEOUS at 14:46

## 2018-05-09 RX ADMIN — TICAGRELOR 90 MILLIGRAM(S): 90 TABLET ORAL at 06:40

## 2018-05-09 RX ADMIN — Medication 2: at 13:29

## 2018-05-09 NOTE — DISCHARGE NOTE ADULT - CONDITION (STATED IN TERMS THAT PERMIT A SPECIFIC MEASURABLE COMPARISON WITH CONDITION ON ADMISSION):
As per Attending, patient stable for discharge today and to followup with cardiologist and Allergist next week. Also please see your primary care doctor

## 2018-05-09 NOTE — DISCHARGE NOTE ADULT - PLAN OF CARE
Pt will not longer have episodes of chest pain Continue aspirin and Ticagrelor, do not stop unless instructed by your physician.  Continue low salt, fat, cholesterol and carbohydrate diet. Follow up with cardiologist  Dr Sanchez next week. Also continue to see your primary care doctor for routine visits You had a step placed in your heart for through cardiac cath procedure on 5/8. You had an echocardiogram on 5/8; your EF is 69% Pt will prevent allergic episodes to contrast by following the below procedure and premedicating Please see an Allergist for continued care. You received three doses of prednisone. If you require iodinated contrast in the future, our premedication recommendations are standard: Oral prednisone 50 mg at 13, 7, and 1 hour AND Diphenhydramine 50 mg oral, IM, or IV 1 hour prior to procedure. For emergency pretreatment- Methylprednisolone 40 mg IV every 4 hours until contrast study performed plus diphenhydramine 50 mg IV 1 hour prior to contrast injection. Use lower/iso-osmolar RCM. Emergency therapy for a recurrent hypersensitivity reaction should be available.   Please notify your doctor if you have any signs of allergic reaction such as itching, hives, shortness of breath, etc To maintain a normal blood glucose level and HgA1C level < 5.7 and to prevent diabetic complications such as uncontrolled diabetes, diabetic coma, blindness, renal failure, and amputations. Monitor finger sticks pre-meal and bedtime, low salt, fat and carbohydrate diet, minimize glucose intake.  Exercise daily for at least 30 minutes and weight loss.  Follow up with primary care physician and endocrinologist for routine Hemoglobin A1C checks and management.  Follow up with your ophthalmologist for routine yearly vision exams. To maintain normal cholesterol levels to prevent stroke, coronary artery disease, peripheral vascular disease and heart attacks. Low fat diet, exercise daily and continue current medications. Follow up with primary care physician and cardiologist for management. Continue aspirin and Ticagrelor, do not stop unless instructed by your physician.  Continue low salt, fat, cholesterol and carbohydrate diet. Follow up with cardiologist  Dr Sanchez next week. Also continue to see your primary care doctor for routine visits You had a step placed in your heart for through cardiac cath procedure on 5/8. You had an echocardiogram on 5/8; your EF is 69%. Your medication has a 45 dollar copay. The pharmacy is looking for a coupon for you

## 2018-05-09 NOTE — DISCHARGE NOTE ADULT - CARE PLAN
Principal Discharge DX:	NSTEMI (non-ST elevated myocardial infarction)  Goal:	Pt will not longer have episodes of chest pain  Assessment and plan of treatment:	Continue aspirin and Ticagrelor, do not stop unless instructed by your physician.  Continue low salt, fat, cholesterol and carbohydrate diet. Follow up with cardiologist  Dr Sanchez next week. Also continue to see your primary care doctor for routine visits You had a step placed in your heart for through cardiac cath procedure on 5/8. You had an echocardiogram on 5/8; your EF is 69%  Secondary Diagnosis:	Allergic reaction to contrast material  Goal:	Pt will prevent allergic episodes to contrast by following the below procedure and premedicating  Assessment and plan of treatment:	Please see an Allergist for continued care. You received three doses of prednisone. If you require iodinated contrast in the future, our premedication recommendations are standard: Oral prednisone 50 mg at 13, 7, and 1 hour AND Diphenhydramine 50 mg oral, IM, or IV 1 hour prior to procedure. For emergency pretreatment- Methylprednisolone 40 mg IV every 4 hours until contrast study performed plus diphenhydramine 50 mg IV 1 hour prior to contrast injection. Use lower/iso-osmolar RCM. Emergency therapy for a recurrent hypersensitivity reaction should be available.   Please notify your doctor if you have any signs of allergic reaction such as itching, hives, shortness of breath, etc  Secondary Diagnosis:	Diabetes  Goal:	To maintain a normal blood glucose level and HgA1C level < 5.7 and to prevent diabetic complications such as uncontrolled diabetes, diabetic coma, blindness, renal failure, and amputations.  Assessment and plan of treatment:	Monitor finger sticks pre-meal and bedtime, low salt, fat and carbohydrate diet, minimize glucose intake.  Exercise daily for at least 30 minutes and weight loss.  Follow up with primary care physician and endocrinologist for routine Hemoglobin A1C checks and management.  Follow up with your ophthalmologist for routine yearly vision exams.  Secondary Diagnosis:	HLD (hyperlipidemia)  Goal:	To maintain normal cholesterol levels to prevent stroke, coronary artery disease, peripheral vascular disease and heart attacks.  Assessment and plan of treatment:	Low fat diet, exercise daily and continue current medications. Follow up with primary care physician and cardiologist for management. Principal Discharge DX:	NSTEMI (non-ST elevated myocardial infarction)  Goal:	Pt will not longer have episodes of chest pain  Assessment and plan of treatment:	Continue aspirin and Ticagrelor, do not stop unless instructed by your physician.  Continue low salt, fat, cholesterol and carbohydrate diet. Follow up with cardiologist  Dr Sanchez next week. Also continue to see your primary care doctor for routine visits You had a step placed in your heart for through cardiac cath procedure on 5/8. You had an echocardiogram on 5/8; your EF is 69%. Your medication has a 45 dollar copay. The pharmacy is looking for a coupon for you  Secondary Diagnosis:	Allergic reaction to contrast material  Goal:	Pt will prevent allergic episodes to contrast by following the below procedure and premedicating  Assessment and plan of treatment:	Please see an Allergist for continued care. You received three doses of prednisone. If you require iodinated contrast in the future, our premedication recommendations are standard: Oral prednisone 50 mg at 13, 7, and 1 hour AND Diphenhydramine 50 mg oral, IM, or IV 1 hour prior to procedure. For emergency pretreatment- Methylprednisolone 40 mg IV every 4 hours until contrast study performed plus diphenhydramine 50 mg IV 1 hour prior to contrast injection. Use lower/iso-osmolar RCM. Emergency therapy for a recurrent hypersensitivity reaction should be available.   Please notify your doctor if you have any signs of allergic reaction such as itching, hives, shortness of breath, etc  Secondary Diagnosis:	Diabetes  Goal:	To maintain a normal blood glucose level and HgA1C level < 5.7 and to prevent diabetic complications such as uncontrolled diabetes, diabetic coma, blindness, renal failure, and amputations.  Assessment and plan of treatment:	Monitor finger sticks pre-meal and bedtime, low salt, fat and carbohydrate diet, minimize glucose intake.  Exercise daily for at least 30 minutes and weight loss.  Follow up with primary care physician and endocrinologist for routine Hemoglobin A1C checks and management.  Follow up with your ophthalmologist for routine yearly vision exams.  Secondary Diagnosis:	HLD (hyperlipidemia)  Goal:	To maintain normal cholesterol levels to prevent stroke, coronary artery disease, peripheral vascular disease and heart attacks.  Assessment and plan of treatment:	Low fat diet, exercise daily and continue current medications. Follow up with primary care physician and cardiologist for management.

## 2018-05-09 NOTE — DISCHARGE NOTE ADULT - FINDINGS/TREATMENT
You had a stent placed in your heart. It is important to stay on the two antiplatelet drugs and be sure to see your cardiologist. Call for an appointment next week.

## 2018-05-09 NOTE — DISCHARGE NOTE ADULT - ADDITIONAL INSTRUCTIONS
Be sure to keep appointment with Dr Sanchez in 1 week. [phone] Be sure to keep appointment with Dr Sanchez in 1 week. [phone]. Call your Allergist for additional testing

## 2018-05-09 NOTE — CONSULT NOTE ADULT - ATTENDING COMMENTS
I have seen and examined this patient and agree with Dr. Bolden's above note. Briefly this is a 57 year old man with a recent NSTEMI and anaphylactoid reaction to radiocontrast media during recent cath.  His reaction was characterized by I have seen and examined this patient and agree with Dr. Bolden's above note. Briefly this is a 57 year old man with a recent NSTEMI and anaphylactoid (non-gE mediated anaphylaxis) reaction to radiocontrast media during recent cath.  His reaction was characterized by lip swelling, pruritus and urticaria of his lower extremities.  He has recovered well after treatment with IV steroids, and antihistamines. Recommend one more dose of oral steroids to complete 3 days and premedication regimen for future doses of radiocontrast media, as well as a lower osmolarity RCM if available.

## 2018-05-09 NOTE — DIETITIAN INITIAL EVALUATION ADULT. - PROBLEM SELECTOR PLAN 1
WIll TELE CE ECG LYTES will RPT CE at 2100 and reasses with rpt ECG, will talk to PMD in regard to further ischemic W/U based on CE and ECG as well as symptoms

## 2018-05-09 NOTE — CONSULT NOTE ADULT - PROBLEM SELECTOR RECOMMENDATION 9
Patient has symptoms consistent with allergic reaction to radiocontrast material. He as been instructed that he should inform all medical caretakers of this in the future.   If the patient requires iodinated contrast in the future, our premedication recommendations are standard: Oral prednisone 50 mg at 13, 7, and 1 hour AND Diphenhydramine 50 mg oral, IM, or IV 1 hour prior to procedure.  -For emergency pretreatment- Methylprednisolone 40 mg IV every 4 hours until contrast study performed plus diphenhydramine 50 mg IV 1 hour prior to contrast injection.  -Use lower/iso-osmolar RCM   -Emergency therapy for a recurrent hypersensitivity reaction should be available. Patient has symptoms consistent with allergic reaction to radiocontrast material. He has been instructed that he should inform all medical caretakers of this in the future.   If the patient requires iodinated contrast in the future, our premedication recommendations are standard: Oral prednisone 50 mg at 13, 7, and 1 hour AND Diphenhydramine 50 mg oral, IM, or IV 1 hour prior to procedure.  -For emergency pretreatment- Methylprednisolone 40 mg IV every 4 hours until contrast study performed plus diphenhydramine 50 mg IV 1 hour prior to contrast injection.  -Use lower/iso-osmolar RCM   -Emergency therapy for a recurrent hypersensitivity reaction should be available.

## 2018-05-09 NOTE — CONSULT NOTE ADULT - SUBJECTIVE AND OBJECTIVE BOX
INTERVAL HPI/ OVERNIGHT EVENTS: Patient admitted to CCU for suspected allergic reaction to IV contrast with lip swelling and hives, treated with Benadryl 25 mg IV x 1, Solu-cortef 100 mg IV x1, Pepcid 20 mg IV x 1 given in cath lab, with relief of symptoms. Patient was noted to have new TWI in V2 on ECG #1 post cath and deeper TWI in V2-V6, asymptomatic, no further intervention. Patient admitted to CCU, no acute events otherwise, patient remained stable and asymptomatic.     SUBJECTIVE: Patient seen and examined at bedside this AM. Says swelling of lips has resolved, hives have resolved. Denies sensation of throat closing, wheezing, difficulty breathing, new rash. Patient has no complaints overnight, asking if he can go home.     OBJECTIVE:    VITAL SIGNS:  ICU Vital Signs Last 24 Hrs  T(C): 36.4 (09 May 2018 04:00), Max: 36.8 (08 May 2018 21:17)  T(F): 97.5 (09 May 2018 04:00), Max: 98.3 (08 May 2018 21:17)  HR: 67 (09 May 2018 06:00) (61 - 105)  BP: 133/85 (09 May 2018 06:00) (99/61 - 137/81)  BP(mean): 95 (09 May 2018 06:00) (68 - 95)  ABP: --  ABP(mean): --  RR: 11 (09 May 2018 06:00) (9 - 19)  SpO2: 98% (09 May 2018 06:00) (92% - 99%)        05-08 @ 07:01  -  05-09 @ 07:00  --------------------------------------------------------  IN: 0 mL / OUT: 150 mL / NET: -150 mL      CAPILLARY BLOOD GLUCOSE      POCT Blood Glucose.: 226 mg/dL (08 May 2018 22:19)      PHYSICAL EXAM:  General: Patient in NAD, non-toxic appearing  HEENT: NC/AT; PERRL, anicteric sclera. No swelling of uvula, no tongue/ lip swelling, no periorbital edema  Neck: Supple, no JVD, no stridor  Respiratory: Lungs CTABL, no wheezing, rhonchi, or rales  Cardiovascular: +S1/S2; RRR; no M/R/G  Gastrointestinal: soft, NT/ND; +BS x4  Extremities: WWP; 2+ peripheral pulses B/L; no LE edema. R radial access soft  Skin: normal color and turgor; no rash  Neurological: Grossly intact    MEDICATIONS:  MEDICATIONS  (STANDING):  amLODIPine   Tablet 10 milliGRAM(s) Oral daily  aspirin enteric coated 81 milliGRAM(s) Oral daily  atorvastatin 40 milliGRAM(s) Oral at bedtime  dextrose 5%. 1000 milliLiter(s) (50 mL/Hr) IV Continuous <Continuous>  dextrose 50% Injectable 12.5 Gram(s) IV Push once  dextrose 50% Injectable 25 Gram(s) IV Push once  dextrose 50% Injectable 25 Gram(s) IV Push once  heparin  Injectable 5000 Unit(s) SubCutaneous every 8 hours  insulin lispro (HumaLOG) corrective regimen sliding scale   SubCutaneous three times a day before meals  insulin lispro (HumaLOG) corrective regimen sliding scale   SubCutaneous at bedtime  insulin NPH human recombinant 25 Unit(s) SubCutaneous before breakfast  insulin NPH human recombinant 15 Unit(s) SubCutaneous at bedtime  metoprolol succinate  milliGRAM(s) Oral daily  predniSONE   Tablet 40 milliGRAM(s) Oral daily  ticagrelor 90 milliGRAM(s) Oral two times a day    MEDICATIONS  (PRN):  dextrose Gel 1 Dose(s) Oral once PRN Blood Glucose LESS THAN 70 milliGRAM(s)/deciliter  glucagon  Injectable 1 milliGRAM(s) IntraMuscular once PRN Glucose LESS THAN 70 milligrams/deciliter      ALLERGIES: Allergies    iodinated radiocontrast agents (Hives; Swelling)  Intolerances        LABS:                        14.7   8.64  )-----------( 245      ( 09 May 2018 05:30 )             43.7     05-09    135  |  102  |  26<H>  ----------------------------<  136<H>  3.9   |  22  |  1.28    Ca    8.7      09 May 2018 05:30  Mg     2.2     05-09        PT/INR - ( 08 May 2018 07:00 )   PT: 11.5 SEC;   INR: 1.04     PTT - ( 08 May 2018 07:00 )  PTT:45.8 SEC    CARDIAC MARKERS ( 09 May 2018 05:30 )  x     / < 0.06 ng/mL / 62 u/L / 3.53 ng/mL / x          RADIOLOGY & ADDITIONAL TESTS: Reviewed.    TTE: < from: Transthoracic Echocardiogram (05.08.18 @ 15:00) >  CONCLUSIONS:  1. Normal mitral valve. Minimal mitral regurgitation.  2. Normal trileaflet aortic valve.  3. Normal left ventricular internal dimensions and wall  thicknesses.  4. Endocardium not well visualized; grossly normal left  ventricular systolic function.  5. Normal right ventricular size and function.    EF: 69%    < end of copied text >      Assessment and Plan:   · Assessment		  57 year old M w/ HTN, DM w/ NSTEMI S/p cath 5/8 with stent to 90% stenosis of proximal LAD, post-procedure course complicated by allergic reaction, likely IV contrast allergy, resolved with Benadryl, Solu-cortef, Pepcid, transferred to CCU for close monitoring, clinically stable.        Problem/Plan - 1:  ·  Problem: NSTEMI (non-ST elevated myocardial infarction).  Plan: - NSTEMI s/p cath with 90% lesion to proximal LAD s/p stent x 1, OM 60%, RCA 70%, EF 55%  - Cardiac enzymes trended down, will stop trending  - c/w Aspirin 81 mg PO daily, Brilinta 90 mg PO BID, Metoprolol succinate 100 mg PO QD, Lipitor 80 mg PO daily  - Holding ACE- inhibitor/ ARB in setting of angioedema/ recent contrast  - Close monitoring in CCU.     Problem/Plan - 2:  ·  Problem: Allergic reaction to contrast material.  Plan: - Lip swelling, hives s/p cardiac cath, likely reaction to IV contrast, resolved with Benadryl, Solu-cortef, and Pepcid, no Epinephrine given  - No s/s of allergic reaction/ anaphylaxis at present  - C/w Prednisone 40 mg PO   - Close monitoring in CCU.     Problem/Plan - 3:  ·  Problem: HTN (hypertension).  Plan: - BP at goal  - C/w Amlodipine, Metoprolol w/ hold parameters  - BP monitoring.     Problem/Plan - 4:  ·  Problem: HLD (hyperlipidemia).  Plan: - C/w Atorvastatin 40 mg HS.     Problem/Plan - 5:  ·  Problem: Diabetes.  Plan: - FSG at goal  - FSG TID w/ meals and at bedtime  - C/w NPH 25 U before breakfast, 15 units HS, ISS.

## 2018-05-09 NOTE — PROGRESS NOTE ADULT - ATTENDING COMMENTS
Patient seen and examined.  Agree with above.   -continue with asa and brilinta  -rash improving  -allergy consult    Heather Mandujano MD

## 2018-05-09 NOTE — PROGRESS NOTE ADULT - PROBLEM SELECTOR PLAN 5
- FSG at goal  - FSG TID w/ meals and at bedtime  - C/w NPH 15 units HS, ISS - FSG at goal  - FSG TID w/ meals and at bedtime  - C/w NPH 25 U before breakfast, 15 units HS, ISS

## 2018-05-09 NOTE — CONSULT NOTE ADULT - SUBJECTIVE AND OBJECTIVE BOX
Patient is a 57y old  Male who presents with a chief complaint of   HPI:  Pt 56yo Active male with Hx of HTN (SBP baseline in 130)  DM (recent hospitalization to [facility] for hyperglycemia, and hx of DKA in a past) brought to [facility] by EMS for chest discomfort. Pt reports sudden onset of midsternal chest discomfort that at 1130 this mourning while taking out garbage. Pain was 8/10 in intensity, non radiating, non pleuritic with no associated SOB, N/V or diaphoresis. Pt reports with onset of chest discomfort he took one ASA and another dose of ASA was delivered by EMS. Pt reports that discomfort lasted for about one hour and gradually resolved. Pt denies having similar events in a past. Pt reports no cough, no fever or chills, no N/V, no Leg pain or swelling. Pt reports changes in exertional tolerance (he runs 30-40min three time per weeks with no symptoms) .   Pt reports recent hospitalization to [facility] for elevated serum glucose  (not DKA) his glycemic regiment was adjusted and Pt was sent home on humulin 70/30 25 in am and 15 U in Pm since then according to Pt his daily FS are below 200.   Pt reports Hx of STRESS TEST 4y ago that was done routinely and according to Pt STRESS test was normal (04 May 2018 19:04)    Patient was taken to the cath lab yesterday 5/8 for cardiac catheterization.  During the procedure patient was given versed and fentanyl for conscious sedation (at 427pm) followed by verapamil (434pm) and heparin (434 and 454pm). Per cardiologist, patient received IV contrast (optitray) continuously every few minutes during the procedure.  Around 510 pm the patient reports that he developed nausea and had emesis x1 and was found to have elevated BP (181/95).  He then reports that he developed itching over his legs and it was reported that he developed swelling of his upper lips as well as rash over his chest as well.  He was treated with solucortef, benadryl and pepcid.  His itching symptoms quickly resolved, he was taken to the PACU and had no further episodes. No evidence of hypotension, no shortness of breath, no trouble swallowing and no wheezing were noted at any point.  Patient reports that his lip swelling quickly improved and that the rash was completely gone by this morning.   Patient reports no history of any food or drug allergies, no environmental or seasonal allergies and no history of asthma.  He has never been given contrast before and has never had a reaction to any medications.  He has had other surgeries under conscious sedation without any reaction in the past.        Allergies    iodinated radiocontrast agents (Hives; Swelling)    Intolerances      MEDICATIONS  (STANDING):  amLODIPine   Tablet 10 milliGRAM(s) Oral daily  aspirin enteric coated 81 milliGRAM(s) Oral daily  atorvastatin 40 milliGRAM(s) Oral at bedtime  dextrose 5%. 1000 milliLiter(s) (50 mL/Hr) IV Continuous <Continuous>  dextrose 50% Injectable 12.5 Gram(s) IV Push once  dextrose 50% Injectable 25 Gram(s) IV Push once  dextrose 50% Injectable 25 Gram(s) IV Push once  heparin  Injectable 5000 Unit(s) SubCutaneous every 8 hours  insulin lispro (HumaLOG) corrective regimen sliding scale   SubCutaneous three times a day before meals  insulin lispro (HumaLOG) corrective regimen sliding scale   SubCutaneous at bedtime  insulin NPH human recombinant 25 Unit(s) SubCutaneous before breakfast  insulin NPH human recombinant 15 Unit(s) SubCutaneous at bedtime  metoprolol succinate  milliGRAM(s) Oral daily  ticagrelor 90 milliGRAM(s) Oral two times a day    MEDICATIONS  (PRN):  dextrose Gel 1 Dose(s) Oral once PRN Blood Glucose LESS THAN 70 milliGRAM(s)/deciliter  glucagon  Injectable 1 milliGRAM(s) IntraMuscular once PRN Glucose LESS THAN 70 milligrams/deciliter      PAST MEDICAL & SURGICAL HISTORY:  DKA (diabetic ketoacidoses)  HLD (hyperlipidemia)  HTN (hypertension)  Diabetes  No significant past surgical history      REVIEW OF SYSTEMS  All review of systems negative, except for those marked:  General: no fever		  Eyes:	no eye swelling		  ENT:		as above	  Pulmonary:		no SOB, no wheezing  Cardiac:		as above  Gastrointestinal:	 vomiting as above  Skin:		see HPI	  Endocrine:	+DM	  Hematologic:	no bleeding or bruising	  Allergy/Immune:	 as above    SOCIAL/ENVIRONMENTAL HISTORY:  nonsmoker    FAMILY HISTORY:  Family history of high blood pressure (Father, Mother)  Family history of early CAD (Father)    Vital Signs Last 24 Hrs  T(C): 36.5 (09 May 2018 17:01), Max: 36.8 (08 May 2018 21:17)  T(F): 97.7 (09 May 2018 17:01), Max: 98.3 (08 May 2018 21:17)  HR: 67 (09 May 2018 17:01) (61 - 105)  BP: 123/76 (09 May 2018 17:01) (99/61 - 137/81)  BP(mean): 74 (09 May 2018 13:00) (68 - 95)  RR: 17 (09 May 2018 17:01) (9 - 22)  SpO2: 96% (09 May 2018 17:01) (92% - 99%)    PHYSICAL EXAM  All physical exam findings normal, except for those marked:  General:	alert, well developed/well nourished, no acute distress  Eyes      no conjunctival injection, no discharge, no photophobia, intact                 extraocular movements, sclera not icteric, no suborbital bogginess  ENT:   , no pharyngeal erythema or exudates, no  .		cobblestoning, normal tongue and lips, normal tonsils   Neck    supple, full range of motion  Lymph Nodes	normal size and consistency, non-tender  Cardiovascular	regular rate and rhythm; Normal S1, S2; No murmur  Respiratory	good air movement bilaterally, no wheezing or crackles,  no retractions  Abdominal	soft; ND, NT  Skin		skin intact and not indurated; no rash, xerosis throughout  Neurologic	alert, appropriate for age, cranial nerves II-XII grossly normal  Musculoskeletal	 no clubbing; no cyanosis;  .			no edema    Lab Results:                        14.7   8.64  )-----------( 245      ( 09 May 2018 05:30 )             43.7     05-09    135  |  102  |  26<H>  ----------------------------<  136<H>  3.9   |  22  |  1.28    Ca    8.7      09 May 2018 05:30  Mg     2.2     05-09        PT/INR - ( 08 May 2018 07:00 )   PT: 11.5 SEC;   INR: 1.04          PTT - ( 08 May 2018 07:00 )  PTT:45.8 SEC Patient is a 57y old  Male who presents with a chief complaint  chest pain    HPI:  Salvatore is a 57 year old active male with Hx of HTN (SBP baseline in 130)  DM (recent hospitalization to [facility] for hyperglycemia, and hx of DKA in a past) brought to [facility] by EMS for chest discomfort. Pt reports sudden onset of midsternal chest discomfort at 1130 this morning while taking out garbage. Pain was 8/10 in intensity, non radiating, non pleuritic with no associated SOB, N/V or diaphoresis. Pt reports with onset of chest discomfort he took one ASA and another dose of ASA was delivered by EMS. Pt reports that discomfort lasted for about one hour and gradually resolved. Pt denies having similar events in a past. Pt reports no cough, no fever or chills, no N/V, no Leg pain or swelling. Pt reports changes in exertional tolerance (he runs 30-40min three time per weeks with no symptoms) .   Pt reports recent hospitalization to [facility] for elevated serum glucose  (not DKA) his glycemic regiment was adjusted and Pt was sent home on humulin 70/30 25 in am and 15 U in Pm since then according to Pt his daily FS are below 200.   Pt reports Hx of STRESS TEST 4y ago that was done routinely and according to Pt STRESS test was normal (04 May 2018 19:04)    Patient was taken to the cath lab yesterday 5/8 for cardiac catheterization.  During the procedure patient was given versed and fentanyl for conscious sedation (at 427pm) followed by verapamil (434pm) and heparin (434 and 454pm). Per cardiologist, patient received IV contrast (optitray) continuously every few minutes during the procedure.  Around 510 pm the patient reports that he developed nausea and had emesis x1 and was found to have elevated BP (181/95).  He then reports that he developed itching over his legs and it was reported that he developed swelling of his upper lips as well as rash over his chest as well.  He was treated with solucortef, benadryl and pepcid.  His itching symptoms quickly resolved, he was taken to the PACU and had no further episodes. No evidence of hypotension, no shortness of breath, no trouble swallowing and no wheezing were noted at any point.  Patient reports that his lip swelling quickly improved and that the rash was completely gone by this morning.   Patient reports no history of any food or drug allergies, no environmental or seasonal allergies and no history of asthma.  He has never been given contrast before and has never had a reaction to any medications.  He has had other surgeries under conscious sedation without any reaction in the past.  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      Allergies    iodinated radiocontrast agents (Hives; Swelling)    Intolerances      MEDICATIONS  (STANDING):  amLODIPine   Tablet 10 milliGRAM(s) Oral daily  aspirin enteric coated 81 milliGRAM(s) Oral daily  atorvastatin 40 milliGRAM(s) Oral at bedtime  dextrose 5%. 1000 milliLiter(s) (50 mL/Hr) IV Continuous <Continuous>  dextrose 50% Injectable 12.5 Gram(s) IV Push once  dextrose 50% Injectable 25 Gram(s) IV Push once  dextrose 50% Injectable 25 Gram(s) IV Push once  heparin  Injectable 5000 Unit(s) SubCutaneous every 8 hours  insulin lispro (HumaLOG) corrective regimen sliding scale   SubCutaneous three times a day before meals  insulin lispro (HumaLOG) corrective regimen sliding scale   SubCutaneous at bedtime  insulin NPH human recombinant 25 Unit(s) SubCutaneous before breakfast  insulin NPH human recombinant 15 Unit(s) SubCutaneous at bedtime  metoprolol succinate  milliGRAM(s) Oral daily  ticagrelor 90 milliGRAM(s) Oral two times a day    MEDICATIONS  (PRN):  dextrose Gel 1 Dose(s) Oral once PRN Blood Glucose LESS THAN 70 milliGRAM(s)/deciliter  glucagon  Injectable 1 milliGRAM(s) IntraMuscular once PRN Glucose LESS THAN 70 milligrams/deciliter      PAST MEDICAL & SURGICAL HISTORY:  DKA (diabetic ketoacidoses)  HLD (hyperlipidemia)  HTN (hypertension)  Diabetes  No significant past surgical history      REVIEW OF SYSTEMS  All review of systems negative, except for those marked:  General: no fever		  Eyes:	no eye swelling		  ENT:		as above	  Pulmonary:		no SOB, no wheezing  Cardiac:		as above  Gastrointestinal:	 vomiting as above  Skin:		see HPI	  Endocrine:	+DM	  Hematologic:	no bleeding or bruising	  Allergy/Immune:	 as above    SOCIAL/ENVIRONMENTAL HISTORY:  nonsmoker    FAMILY HISTORY:  Family history of high blood pressure (Father, Mother)  Family history of early CAD (Father)    Vital Signs Last 24 Hrs  T(C): 36.5 (09 May 2018 17:01), Max: 36.8 (08 May 2018 21:17)  T(F): 97.7 (09 May 2018 17:01), Max: 98.3 (08 May 2018 21:17)  HR: 67 (09 May 2018 17:01) (61 - 105)  BP: 123/76 (09 May 2018 17:01) (99/61 - 137/81)  BP(mean): 74 (09 May 2018 13:00) (68 - 95)  RR: 17 (09 May 2018 17:01) (9 - 22)  SpO2: 96% (09 May 2018 17:01) (92% - 99%)    PHYSICAL EXAM  All physical exam findings normal, except for those marked:  General:	alert, well developed/well nourished, no acute distress  Eyes      no conjunctival injection, no discharge, no photophobia, intact                 extraocular movements, sclera not icteric, no suborbital bogginess  ENT:   , no pharyngeal erythema or exudates, no  .		cobblestoning, normal tongue and lips, normal tonsils   Neck    supple, full range of motion  Lymph Nodes	normal size and consistency, non-tender  Cardiovascular	regular rate and rhythm; Normal S1, S2; No murmur  Respiratory	good air movement bilaterally, no wheezing or crackles,  no retractions  Abdominal	soft; ND, NT  Skin		skin intact and not indurated; no rash, xerosis throughout, several scattered excoriations on his lower extremities (which the patient attributes to yard word)   Neurologic	alert, appropriate for age, cranial nerves II-XII grossly normal  Musculoskeletal	 no clubbing; no cyanosis;  .			no edema    Lab Results:                        14.7   8.64  )-----------( 245      ( 09 May 2018 05:30 )             43.7     05-09    135  |  102  |  26<H>  ----------------------------<  136<H>  3.9   |  22  |  1.28    Ca    8.7      09 May 2018 05:30  Mg     2.2     05-09        PT/INR - ( 08 May 2018 07:00 )   PT: 11.5 SEC;   INR: 1.04          PTT - ( 08 May 2018 07:00 )  PTT:45.8 SEC

## 2018-05-09 NOTE — DISCHARGE NOTE ADULT - CARE PROVIDERS DIRECT ADDRESSES
,DirectAddress_Unknown,[email or web address]

## 2018-05-09 NOTE — DIETITIAN INITIAL EVALUATION ADULT. - DIET TYPE
consistent carbohydrate (evening snack)/low fat/regular/DASH/TLC (sodium and cholesterol restricted diet)

## 2018-05-09 NOTE — PROGRESS NOTE ADULT - PROBLEM SELECTOR PLAN 1
- NSTEMI s/p cath with 90% lesion to proximal LAD s/p stent x 1, OM 60%, RCA 70%, EF 55%  - Cardiac enzymes trended down, will stop trending  - c/w Aspirin 81 mg PO daily, Brilinta 90 mg PO BID, Metoprolol succinate 100 mg PO QD, Lipitor 80 mg PO daily  - Will start Lisinopril low dose, 2.5 mg QD  - Close monitoring in CCU - NSTEMI s/p cath with 90% lesion to proximal LAD s/p stent x 1, OM 60%, RCA 70%, EF 55%  - Cardiac enzymes trended down, will stop trending  - c/w Aspirin 81 mg PO daily, Brilinta 90 mg PO BID, Metoprolol succinate 100 mg PO QD, Lipitor 80 mg PO daily  - Holding ACE- inhibitor/ ARB in setting of angioedema/ recent contrast  - Close monitoring in CCU

## 2018-05-09 NOTE — DISCHARGE NOTE ADULT - PATIENT PORTAL LINK FT
You can access the [facility] Patient Portal, offered by [facility], by registering with the following website: [email or web address]

## 2018-05-09 NOTE — DIETITIAN INITIAL EVALUATION ADULT. - OTHER INFO
Nutrition assessment initiated for critical care LOS. Pt. on PO diet , tolerating , verbalized therapeutic diet well, states his Hemoglobin A1C was elevated last time 2/2 eating increased fruits not realizing it is carbohydrates , he was thinking he is eating low fat and fresh fruits/ fiber . He checks fingersticks @ home and is < 125 .

## 2018-05-09 NOTE — CONSULT NOTE ADULT - ASSESSMENT
58yo male with Hx of HTN, DM who presented with chest pain and was found to have NSTEMI and is now s/p reaction in the cath lab concerning for allergic reaction.    The patient's history of consistent with an allergic reaction to radiocontrast media.  The timing of his reaction within his catheterization procedure fits with this diagnosis and he did not temporally receive any other medications that seem to be likely culprits for the reaction.  Hypersensitivity reactions to radiocontrast media are typically not IgE mediated are instead thought to be secondary to the osmolality of the contrast causing direct mast cell activation.  Patients who react to contrast are predisposed to react when exposed to contrast again, and therefore premedication regimens have been well delineated as outlined below.   The patient has received 2 days of high dose steroids for treatment of his anaphylactic reaction as well as prevention of future reactions.  At this time, he has likely received adequate treatment, however some sources recommend a full 3 days of steroids for prevention of biphasic anaphylactic reaction.  While this is very unlikely in this patient as it is not truly IgE mediated, the team can give him one further dose of prednisone tomorrow to complete the 3 day course. 58yo male with Hx of HTN, DM who presented with chest pain and was found to have NSTEMI and is now s/p reaction in the cath lab concerning for allergic reaction.    The patient's history of consistent with an allergic reaction to radiocontrast media.  The timing of his reaction within his catheterization procedure fits with this diagnosis and he did not temporally receive any other medications that seem to be likely culprits for the reaction.  Hypersensitivity reactions to radiocontrast media are typically not IgE mediated are instead thought to be secondary to the osmolality of the contrast causing direct mast cell activation.  Patients who react to contrast are predisposed to react when exposed to contrast again, and therefore premedication regimens are outlined below.   The patient has received 2 days of high dose steroids as prevention of late-phase reactions.  At this time, he has likely received adequate treatment, however some sources recommend a full 3 days of steroids for prevention of biphasic anaphylactic reaction.  While this is very unlikely in this patient as it is not truly IgE mediated, the team can give him one further dose of prednisone tomorrow to complete the 3 day course.

## 2018-05-09 NOTE — DISCHARGE NOTE ADULT - HOSPITAL COURSE
57 year old M with HTN, DM brought to [facility] by EMS for chest discomfort. Pt reported sudden onset of midsternal chest discomfort while taking out garbage. Pain was 8/10 in intensity, non radiating, non pleuritic with no associated SOB, N/V or diaphoresis. Took ASA and another dose of ASA was delivered by EMS. Pt reports that discomfort lasted for about one hour and gradually resolved. Pt reports changes in exertional tolerance (he runs 30-40min three time per weeks with no symptoms) . Pt reports 7Lb Wt loss in past few month as well polyuria that he attributed to poorly controlled DM that lead him to hospitalization to [facility] (after discharge symptoms resolved.  Pt reports recent hospitalization to [facility] for elevated serum glucose  (not DKA) his glycemic regiment was adjusted and Pt was sent home on humulin 70/30 25 in am and 15 U in Pm since then according to Pt his daily FS are below 200. Pt reports Hx of STRESS TEST 4y ago that was done routinely and according to Pt STRESS test was normal    Admitted to TELE on heparin gtt for C/P and elevated TROP of 0.1. ECG NSR no ST changes. Pt asymptomatic. Upon arrival to ER, pt was found with BP of 182/119 HR 86 RR 18 and 97% on RA. RPT Vital at 1800 revealed /81 HR 60 RR 17 and 100% on RA. Patient went for LHC on 5/8/2018.  Cardiac cath revealed pLAD 90%=> stent x 1, OM 60%, RCA 70%, EF 55%, EDP 16. R radial access. Patient developed allergic reaction during cath (swollen lips, hives on anterior chest). Benadryl 25 mg IV x 1, Solu-cortef 100 mg IV x1, Pepcid 20 mg IV x 1 given in cath lab, with resolution on  symptoms. Patient denies any complaints. Patient was noted to have new TWI in V2 on ECG #1 post cath and deeper TWI in V2-V6, asymptomatic. Dr. Betancourt was made aware, no need for any intervention at present, recommended close observation. 57 year old M with HTN, DM brought to [facility] by EMS for chest discomfort. Pt reported sudden onset of midsternal chest discomfort while taking out garbage. Pain was 8/10 in intensity, non radiating, non pleuritic with no associated SOB, N/V or diaphoresis. Took ASA and another dose of ASA was delivered by EMS. On admission EKG showed no acute changes. Upon arrival to ER, pt was found with BP of 182/119 HR 86 RR 18 and 97% on RA. RPT Vital at 1800 revealed /81 HR 60 RR 17 and 100% on RA. Admitted to [facility] on Heparin gtt for chest pain and elevated TROP of 0.1. Patient went for LHC on 5/8/2018.  Cardiac cath revealed pLAD 90%=> stent x 1, OM 60%, RCA 70%, EF 55%, EDP 16. R radial access. Patient developed allergic reaction during cath (swollen lips, hives on anterior chest). Benadryl 25 mg IV x 1, Solu-cortef 100 mg IV x1, Pepcid 20 mg IV x 1 given in cath lab, with resolution on  symptoms. Patient denies any complaints. Patient was noted to have new TWI in V2 on ECG #1 post cath and deeper TWI in V2-V6, asymptomatic, no repeat intervention. Patient transferred to CCU for close monitoring given allergic reaction to IV contrast. CCU course stable, no s/s of allergic reaction/ anaphylaxis. ASA, Brillinta, B-blocker, statin continued, ACE-inhibitor held due to angioedema/ allergic reaction to contrast patient experienced in cath lab. Continued on Prednsione. 57 year old M with HTN, DM brought to [facility] by EMS for chest discomfort. Pt reported sudden onset of midsternal chest discomfort while taking out garbage. Pain was 8/10 in intensity, non radiating, non pleuritic with no associated SOB, N/V or diaphoresis. Took ASA and another dose of ASA was delivered by EMS. On admission EKG showed no acute changes. Upon arrival to ER, pt was found with BP of 182/119 HR 86 RR 18 and 97% on RA. RPT Vital at 1800 revealed /81 HR 60 RR 17 and 100% on RA. Admitted to [facility] on Heparin gtt for chest pain and elevated TROP of 0.1. Patient went for LHC on 5/8/2018.  Cardiac cath revealed pLAD 90%=> stent x 1, OM 60%, RCA 70%, EF 55%, EDP 16. R radial access. Patient developed allergic reaction during cath (swollen lips, hives on anterior chest). Benadryl 25 mg IV x 1, Solu-cortef 100 mg IV x1, Pepcid 20 mg IV x 1 given in cath lab, with resolution on  symptoms. Patient denies any complaints. Patient was noted to have new TWI in V2 on ECG #1 post cath and deeper TWI in V2-V6, asymptomatic, no repeat intervention. Patient transferred to CCU for close monitoring given allergic reaction to IV contrast. CCU course stable, no s/s of allergic reaction/ anaphylaxis. ASA, Brillinta, B-blocker, statin continued, ACE-inhibitor held due to angioedema/ allergic reaction to contrast patient experienced in cath lab. Continued on Prednsione.   Pt is stable for discharge today 5/10; awaiting authorization for Ticagrelor 57 year old M with HTN, DM brought to [facility] by EMS for chest discomfort. Pt reported sudden onset of midsternal chest discomfort while taking out garbage. Pain was 8/10 in intensity, non radiating, non pleuritic with no associated SOB, N/V or diaphoresis. Took ASA and another dose of ASA was delivered by EMS. On admission EKG showed no acute changes. Upon arrival to ER, pt was found with BP of 182/119 HR 86 RR 18 and 97% on RA. RPT Vital at 1800 revealed /81 HR 60 RR 17 and 100% on RA. Admitted to [facility] on Heparin gtt for chest pain and elevated TROP of 0.1. Patient went for LHC on 5/8/2018.  Cardiac cath revealed pLAD 90%=> stent x 1, OM 60%, RCA 70%, EF 55%, EDP 16. R radial access. Patient developed allergic reaction during cath (swollen lips, hives on anterior chest). Benadryl 25 mg IV x 1, Solu-cortef 100 mg IV x1, Pepcid 20 mg IV x 1 given in cath lab, with resolution on  symptoms. Patient denies any complaints. Patient was noted to have new TWI in V2 on ECG #1 post cath and deeper TWI in V2-V6, asymptomatic, no repeat intervention. Patient transferred to CCU for close monitoring given allergic reaction to IV contrast. CCU course stable, no s/s of allergic reaction/ anaphylaxis. ASA, Brillinta, B-blocker, statin continued, ACE-inhibitor held due to angioedema/ allergic reaction to contrast patient experienced in cath lab. Continued on Prednsione.   Pt is stable for discharge today 5/10; awaiting authorization for Ticagrelor   Authorized. Pt to be discharged and will see cardiologist next week

## 2018-05-09 NOTE — DISCHARGE NOTE ADULT - CARE PROVIDER_API CALL
Amarilis Cummins), Cardiovascular Disease; Internal Medicine  [address]  [place] [address]  Phone: [phone]  Fax: [phone]    Jaja Cheung), Pediatric AllergyImmunology; Pediatric Pulmonary Medicine; Pediatrics  [address] [place] [address] [address]  [place] [address]  Phone: [phone]  Fax: [phone]

## 2018-05-09 NOTE — PROGRESS NOTE ADULT - PROBLEM SELECTOR PLAN 3
- BP at goal  - C/w Amlodipine, Metoprolol, start Lisinopril w/ hold parameters  - BP monitoring - BP at goal  - C/w Amlodipine, Metoprolol w/ hold parameters  - BP monitoring

## 2018-05-09 NOTE — DISCHARGE NOTE ADULT - MEDICATION SUMMARY - MEDICATIONS TO TAKE
I will START or STAY ON the medications listed below when I get home from the hospital:    glucometer strips  -- glucometer strips  -- Indication: For Diabetes    alcohol swabs  -- 30 alcohol swabs  -- Indication: For Diabetes    lancets  -- 30 lancets for use for acuchecks  -- Indication: For Diabetes    aspirin 81 mg oral delayed release tablet  -- 1 tab(s) by mouth once a day  -- Indication: For NSTEMI (non-ST elevated myocardial infarction)    HumuLIN 70/30 subcutaneous suspension  -- 25 unit(s) subcutaneous once a day (in the morning)   -- Do not drink alcoholic beverages when taking this medication.  It is very important that you take or use this exactly as directed.  Do not skip doses or discontinue unless directed by your doctor.  Keep in refrigerator.  Do not freeze.    -- Indication: For Diabetes    HumuLIN 70/30 subcutaneous suspension  -- 15 unit(s) subcutaneous once a day (in the evening)   -- Do not drink alcoholic beverages when taking this medication.  It is very important that you take or use this exactly as directed.  Do not skip doses or discontinue unless directed by your doctor.  Keep in refrigerator.  Do not freeze.    -- Indication: For Diabetes    Lipitor  -- 40 milligram(s) by mouth once a day (at bedtime)  -- Indication: For HLD (hyperlipidemia)    ticagrelor 90 mg oral tablet  -- 1 tab(s) by mouth 2 times a day  -- Indication: For NSTEMI (non-ST elevated myocardial infarction)    Metoprolol Succinate  mg oral tablet, extended release  -- 1 tab(s) by mouth once a day  -- Indication: For NSTEMI (non-ST elevated myocardial infarction)    Norvasc  -- 10 milligram(s) by mouth once a day  -- Indication: For NSTEMI (non-ST elevated myocardial infarction)    hydroCHLOROthiazide 25 mg oral tablet  -- 1 tab(s) by mouth once a day  -- Indication: For HTN (hypertension)

## 2018-05-10 VITALS
HEART RATE: 73 BPM | OXYGEN SATURATION: 100 % | SYSTOLIC BLOOD PRESSURE: 140 MMHG | RESPIRATION RATE: 18 BRPM | DIASTOLIC BLOOD PRESSURE: 87 MMHG | TEMPERATURE: 98 F

## 2018-05-10 LAB
APPEARANCE UR: CLEAR — SIGNIFICANT CHANGE UP
BILIRUB UR-MCNC: NEGATIVE — SIGNIFICANT CHANGE UP
BLOOD UR QL VISUAL: NEGATIVE — SIGNIFICANT CHANGE UP
BUN SERPL-MCNC: 25 MG/DL — HIGH (ref 7–23)
CALCIUM SERPL-MCNC: 9.1 MG/DL — SIGNIFICANT CHANGE UP (ref 8.4–10.5)
CHLORIDE SERPL-SCNC: 103 MMOL/L — SIGNIFICANT CHANGE UP (ref 98–107)
CO2 SERPL-SCNC: 22 MMOL/L — SIGNIFICANT CHANGE UP (ref 22–31)
COLOR SPEC: SIGNIFICANT CHANGE UP
CREAT SERPL-MCNC: 1.01 MG/DL — SIGNIFICANT CHANGE UP (ref 0.5–1.3)
GLUCOSE BLDC GLUCOMTR-MCNC: 121 MG/DL — HIGH (ref 70–99)
GLUCOSE BLDC GLUCOMTR-MCNC: 148 MG/DL — HIGH (ref 70–99)
GLUCOSE SERPL-MCNC: 127 MG/DL — HIGH (ref 70–99)
GLUCOSE UR-MCNC: >1000 — SIGNIFICANT CHANGE UP
HCT VFR BLD CALC: 42.7 % — SIGNIFICANT CHANGE UP (ref 39–50)
HGB BLD-MCNC: 15.2 G/DL — SIGNIFICANT CHANGE UP (ref 13–17)
KETONES UR-MCNC: NEGATIVE — SIGNIFICANT CHANGE UP
LEUKOCYTE ESTERASE UR-ACNC: NEGATIVE — SIGNIFICANT CHANGE UP
MAGNESIUM SERPL-MCNC: 2.4 MG/DL — SIGNIFICANT CHANGE UP (ref 1.6–2.6)
MCHC RBC-ENTMCNC: 29.9 PG — SIGNIFICANT CHANGE UP (ref 27–34)
MCHC RBC-ENTMCNC: 35.6 % — SIGNIFICANT CHANGE UP (ref 32–36)
MCV RBC AUTO: 83.9 FL — SIGNIFICANT CHANGE UP (ref 80–100)
NITRITE UR-MCNC: NEGATIVE — SIGNIFICANT CHANGE UP
NRBC # FLD: 0 — SIGNIFICANT CHANGE UP
PH UR: 6 — SIGNIFICANT CHANGE UP (ref 4.6–8)
PHOSPHATE SERPL-MCNC: 3.4 MG/DL — SIGNIFICANT CHANGE UP (ref 2.5–4.5)
PLATELET # BLD AUTO: 234 K/UL — SIGNIFICANT CHANGE UP (ref 150–400)
PMV BLD: 12 FL — SIGNIFICANT CHANGE UP (ref 7–13)
POTASSIUM SERPL-MCNC: 3.9 MMOL/L — SIGNIFICANT CHANGE UP (ref 3.5–5.3)
POTASSIUM SERPL-SCNC: 3.9 MMOL/L — SIGNIFICANT CHANGE UP (ref 3.5–5.3)
PROT UR-MCNC: NEGATIVE MG/DL — SIGNIFICANT CHANGE UP
RBC # BLD: 5.09 M/UL — SIGNIFICANT CHANGE UP (ref 4.2–5.8)
RBC # FLD: 12.6 % — SIGNIFICANT CHANGE UP (ref 10.3–14.5)
RBC CASTS # UR COMP ASSIST: SIGNIFICANT CHANGE UP (ref 0–?)
SODIUM SERPL-SCNC: 137 MMOL/L — SIGNIFICANT CHANGE UP (ref 135–145)
SP GR SPEC: 1.01 — SIGNIFICANT CHANGE UP (ref 1–1.04)
UROBILINOGEN FLD QL: NORMAL MG/DL — SIGNIFICANT CHANGE UP
WBC # BLD: 14.58 K/UL — HIGH (ref 3.8–10.5)
WBC # FLD AUTO: 14.58 K/UL — HIGH (ref 3.8–10.5)
WBC UR QL: SIGNIFICANT CHANGE UP (ref 0–?)

## 2018-05-10 RX ORDER — TICAGRELOR 90 MG/1
1 TABLET ORAL
Qty: 60 | Refills: 0
Start: 2018-05-10 | End: 2018-06-08

## 2018-05-10 RX ORDER — ASPIRIN/CALCIUM CARB/MAGNESIUM 324 MG
1 TABLET ORAL
Qty: 30 | Refills: 0
Start: 2018-05-10 | End: 2018-06-08

## 2018-05-10 RX ADMIN — Medication 100 MILLIGRAM(S): at 05:37

## 2018-05-10 RX ADMIN — HEPARIN SODIUM 5000 UNIT(S): 5000 INJECTION INTRAVENOUS; SUBCUTANEOUS at 05:36

## 2018-05-10 RX ADMIN — HUMAN INSULIN 25 UNIT(S): 100 INJECTION, SUSPENSION SUBCUTANEOUS at 09:11

## 2018-05-10 RX ADMIN — Medication 81 MILLIGRAM(S): at 11:37

## 2018-05-10 RX ADMIN — AMLODIPINE BESYLATE 10 MILLIGRAM(S): 2.5 TABLET ORAL at 05:37

## 2018-05-10 RX ADMIN — TICAGRELOR 90 MILLIGRAM(S): 90 TABLET ORAL at 05:37

## 2018-05-10 RX ADMIN — Medication 40 MILLIGRAM(S): at 11:37

## 2018-05-10 NOTE — PROGRESS NOTE ADULT - PROBLEM SELECTOR PLAN 2
- Lip swelling, hives s/p cardiac cath, likely reaction to IV contrast, resolved with Benadryl, Solu-cortef, and Pepcid, no Epinephrine given  - No s/s of allergic reaction/ anaphylaxis at present  - C/w Prednisone 40 mg PO   - Close monitoring in CCU
- Lip swelling, hives s/p cardiac cath, likely reaction to IV contrast, resolved with Benadryl, Solu-cortef, and Pepcid, no Epinephrine given  - No s/s of allergic reaction/ anaphylaxis at present  - C/w Prednisone 40 mg PO   - Close monitoring in CCU

## 2018-05-10 NOTE — PROGRESS NOTE ADULT - PROBLEM SELECTOR PLAN 6
- Heparin SC Q8H    Dane Dillard MD PGY-1  Pager: [phone]
- Heparin SC Q8H    Dane Dillard MD PGY-1  Pager: [phone]

## 2018-05-10 NOTE — PROGRESS NOTE ADULT - PROBLEM SELECTOR PLAN 1
- NSTEMI s/p cath with 90% lesion to proximal LAD s/p stent x 1, OM 60%, RCA 70%, EF 55%  - Cardiac enzymes trended down, will stop trending  - c/w Aspirin 81 mg PO daily, Brilinta 90 mg PO BID, Metoprolol succinate 100 mg PO QD, Lipitor 80 mg PO daily  - Holding ACE- inhibitor/ ARB in setting of angioedema/ recent contrast  - Close monitoring in CCU

## 2018-05-10 NOTE — PROGRESS NOTE ADULT - ATTENDING COMMENTS
Agree with above assessment and plan as outlined above.    - D/c planning for today  - complete Steroids per allergy    Josué London MD, FACC

## 2018-05-10 NOTE — PROGRESS NOTE ADULT - ASSESSMENT
57 year old M w/ HTN, DM w/ NSTEMI S/p cath 5/8 with stent to 90% stenosis of proximal LAD, post-procedure course complicated by allergic reaction, likely IV contrast allergy, resolved with Benadryl, Solu-cortef, Pepcid, transferred to CCU for close monitoring, clinically stable.
57 year old M w/ HTN, DM w/ NSTEMI S/p cath 5/8 with stent to 90% stenosis of proximal LAD, post-procedure course complicated by allergic reaction, likely IV contrast allergy, resolved with Benadryl, Solu-cortef, Pepcid, transferred to CCU for close monitoring, clinically stable.

## 2018-05-10 NOTE — PROGRESS NOTE ADULT - SUBJECTIVE AND OBJECTIVE BOX
INTERVAL HPI/ OVERNIGHT EVENTS: Patient admitted to CCU for suspected allergic reaction to IV contrast with lip swelling and hives, treated with Benadryl 25 mg IV x 1, Solu-cortef 100 mg IV x1, Pepcid 20 mg IV x 1 given in cath lab, with relief of symptoms. Patient was noted to have new TWI in V2 on ECG #1 post cath and deeper TWI in V2-V6, asymptomatic, no further intervention. Patient admitted to CCU, no acute events otherwise, patient remained stable and asymptomatic.     SUBJECTIVE: Patient seen and examined at bedside this AM. Says swelling of lips has resolved, hives have resolved. Denies sensation of throat closing, wheezing, difficulty breathing, new rash. Patient has no complaints overnight, asking if he can go home.     OBJECTIVE:    VITAL SIGNS:  ICU Vital Signs Last 24 Hrs  T(C): 36.4 (09 May 2018 04:00), Max: 36.8 (08 May 2018 21:17)  T(F): 97.5 (09 May 2018 04:00), Max: 98.3 (08 May 2018 21:17)  HR: 67 (09 May 2018 06:00) (61 - 105)  BP: 133/85 (09 May 2018 06:00) (99/61 - 137/81)  BP(mean): 95 (09 May 2018 06:00) (68 - 95)  ABP: --  ABP(mean): --  RR: 11 (09 May 2018 06:00) (9 - 19)  SpO2: 98% (09 May 2018 06:00) (92% - 99%)        05-08 @ 07:01  -  05-09 @ 07:00  --------------------------------------------------------  IN: 0 mL / OUT: 150 mL / NET: -150 mL      CAPILLARY BLOOD GLUCOSE      POCT Blood Glucose.: 226 mg/dL (08 May 2018 22:19)      PHYSICAL EXAM:  General: Patient in NAD, non-toxic appearing  HEENT: NC/AT; PERRL, anicteric sclera. No swelling of uvula, no tongue/ lip swelling, no periorbital edema  Neck: Supple, no JVD, no stridor  Respiratory: Lungs CTABL, no wheezing, rhonchi, or rales  Cardiovascular: +S1/S2; RRR; no M/R/G  Gastrointestinal: soft, NT/ND; +BS x4  Extremities: WWP; 2+ peripheral pulses B/L; no LE edema. R radial access soft  Skin: normal color and turgor; no rash  Neurological: Grossly intact    MEDICATIONS:  MEDICATIONS  (STANDING):  amLODIPine   Tablet 10 milliGRAM(s) Oral daily  aspirin enteric coated 81 milliGRAM(s) Oral daily  atorvastatin 40 milliGRAM(s) Oral at bedtime  dextrose 5%. 1000 milliLiter(s) (50 mL/Hr) IV Continuous <Continuous>  dextrose 50% Injectable 12.5 Gram(s) IV Push once  dextrose 50% Injectable 25 Gram(s) IV Push once  dextrose 50% Injectable 25 Gram(s) IV Push once  heparin  Injectable 5000 Unit(s) SubCutaneous every 8 hours  insulin lispro (HumaLOG) corrective regimen sliding scale   SubCutaneous three times a day before meals  insulin lispro (HumaLOG) corrective regimen sliding scale   SubCutaneous at bedtime  insulin NPH human recombinant 25 Unit(s) SubCutaneous before breakfast  insulin NPH human recombinant 15 Unit(s) SubCutaneous at bedtime  metoprolol succinate  milliGRAM(s) Oral daily  predniSONE   Tablet 40 milliGRAM(s) Oral daily  ticagrelor 90 milliGRAM(s) Oral two times a day    MEDICATIONS  (PRN):  dextrose Gel 1 Dose(s) Oral once PRN Blood Glucose LESS THAN 70 milliGRAM(s)/deciliter  glucagon  Injectable 1 milliGRAM(s) IntraMuscular once PRN Glucose LESS THAN 70 milligrams/deciliter      ALLERGIES: Allergies    iodinated radiocontrast agents (Hives; Swelling)  Intolerances        LABS:                        14.7   8.64  )-----------( 245      ( 09 May 2018 05:30 )             43.7     05-09    135  |  102  |  26<H>  ----------------------------<  136<H>  3.9   |  22  |  1.28    Ca    8.7      09 May 2018 05:30  Mg     2.2     05-09        PT/INR - ( 08 May 2018 07:00 )   PT: 11.5 SEC;   INR: 1.04     PTT - ( 08 May 2018 07:00 )  PTT:45.8 SEC    CARDIAC MARKERS ( 09 May 2018 05:30 )  x     / < 0.06 ng/mL / 62 u/L / 3.53 ng/mL / x          RADIOLOGY & ADDITIONAL TESTS: Reviewed.    TTE: < from: Transthoracic Echocardiogram (05.08.18 @ 15:00) >  CONCLUSIONS:  1. Normal mitral valve. Minimal mitral regurgitation.  2. Normal trileaflet aortic valve.  3. Normal left ventricular internal dimensions and wall  thicknesses.  4. Endocardium not well visualized; grossly normal left  ventricular systolic function.  5. Normal right ventricular size and function.    EF: 69%    < end of copied text >
Patient denies CP, SOB  Review of systems otherwise (-)  	    MEDICATIONS  (STANDING):  amLODIPine   Tablet 10 milliGRAM(s) Oral daily  aspirin enteric coated 81 milliGRAM(s) Oral daily  atorvastatin 40 milliGRAM(s) Oral at bedtime  dextrose 5%. 1000 milliLiter(s) (50 mL/Hr) IV Continuous <Continuous>  dextrose 50% Injectable 12.5 Gram(s) IV Push once  dextrose 50% Injectable 25 Gram(s) IV Push once  dextrose 50% Injectable 25 Gram(s) IV Push once  heparin  Injectable 5000 Unit(s) SubCutaneous every 8 hours  insulin lispro (HumaLOG) corrective regimen sliding scale   SubCutaneous three times a day before meals  insulin lispro (HumaLOG) corrective regimen sliding scale   SubCutaneous at bedtime  insulin NPH human recombinant 25 Unit(s) SubCutaneous before breakfast  insulin NPH human recombinant 15 Unit(s) SubCutaneous at bedtime  metoprolol succinate  milliGRAM(s) Oral daily  ticagrelor 90 milliGRAM(s) Oral two times a day    MEDICATIONS  (PRN):  dextrose Gel 1 Dose(s) Oral once PRN Blood Glucose LESS THAN 70 milliGRAM(s)/deciliter  glucagon  Injectable 1 milliGRAM(s) IntraMuscular once PRN Glucose LESS THAN 70 milligrams/deciliter      LABS:                        14.7   8.64  )-----------( 245      ( 09 May 2018 05:30 )             43.7    135  |  102  |  26<H>  ----------------------------<  136<H>  3.9   |  22  |  1.28    Ca    8.7      09 May 2018 05:30  Mg     2.2     05-09    Creatinine Trend: 1.28<--, 1.01<--, 0.96<--, 1.07<--, 0.90<--, 0.92<--     CARDIAC MARKERS ( 09 May 2018 05:30 )  x     / < 0.06 ng/mL / 62 u/L / 3.53 ng/mL / x        PHYSICAL EXAM  Vital Signs Last 24 Hrs  T(C): 36.4 (09 May 2018 12:00), Max: 36.8 (08 May 2018 21:17)  T(F): 97.6 (09 May 2018 12:00), Max: 98.3 (08 May 2018 21:17)  HR: 77 (09 May 2018 15:00) (61 - 105)  BP: 118/66 (09 May 2018 15:00) (99/61 - 137/81)  BP(mean): 74 (09 May 2018 13:00) (68 - 95)  RR: 17 (09 May 2018 15:00) (9 - 22)  SpO2: 96% (09 May 2018 15:00) (92% - 99%)      HEENT:   Normal oral mucosa, PERRL, EOMI	  Lymphatic: No obvious lymphadenopathy , no edema  Cardiovascular: Normal S1 S2, No JVD, 1/6 SURESH murmur, Peripheral pulses palpable 2+ bilaterally  Respiratory: Lungs clear to auscultation, normal effort 	  Gastrointestinal:  Soft, Non-tender, + BS	  Psychiatry:  Appropriate Mood & affect     TELEMETRY: NSR    < from: Transthoracic Echocardiogram (05.08.18 @ 15:00) >  CONCLUSIONS:  1. Normal mitral valve. Minimal mitral regurgitation.  2. Normal trileaflet aortic valve.  3. Normal left ventricular internal dimensions and wall  thicknesses.  4. Endocardium not well visualized; grossly normal left  ventricular systolic function.  5. Normal right ventricular size and function.  ------------------------------------------------------------------------  Confirmed on  5/8/2018 - 16:20:19 by JOSSELIN Wheatley    < end of copied text >        ASSESSMENT/PLAN: 	57 year old Male with Hx of HTN, DM admitted with CP, NSTEMI s/p cath 5/8 with 90%pLAD s/p DARRYL, with presumed contrast allergy with Hives and Lip swelling, monitored in CCU last night    --s/p IV steroid/benadryl/pepcid  --Allergy consult  -_TTE noted  --Cont Asa, statin, brilinta, Metoprolol  --monitor overnight, check AM labs    Audra Alcala PA-C
Patient denies CP, SOB  Review of systems otherwise (-)  	    MEDICATIONS  (STANDING):  amLODIPine   Tablet 10 milliGRAM(s) Oral daily  aspirin enteric coated 81 milliGRAM(s) Oral daily  atorvastatin 40 milliGRAM(s) Oral at bedtime  heparin  Infusion.  Unit(s)/Hr (10 mL/Hr) IV Continuous <Continuous>  insulin lispro (HumaLOG) corrective regimen sliding scale   SubCutaneous three times a day before meals  insulin lispro (HumaLOG) corrective regimen sliding scale   SubCutaneous at bedtime  insulin NPH human recombinant 25 Unit(s) SubCutaneous before breakfast  insulin NPH human recombinant 15 Unit(s) SubCutaneous at bedtime  metoprolol succinate  milliGRAM(s) Oral daily    MEDICATIONS  (PRN):  dextrose Gel 1 Dose(s) Oral once PRN Blood Glucose LESS THAN 70 milliGRAM(s)/deciliter  glucagon  Injectable 1 milliGRAM(s) IntraMuscular once PRN Glucose LESS THAN 70 milligrams/deciliter  heparin  Injectable 5000 Unit(s) IV Push every 6 hours PRN For aPTT less than 40      LABS:                        15.3   7.76  )-----------( 235      ( 08 May 2018 08:45 )             45.1     136  |  102  |  22  ----------------------------<  142<H>  3.8   |  19<L>  |  1.01    Ca    9.0      08 May 2018 04:00  Mg     2.2     05-08    Creatinine Trend: 1.01<--, 0.96<--, 1.07<--, 0.90<--, 0.92<--, 1.00<--   PT/INR - ( 08 May 2018 07:00 )   PT: 11.5 SEC;   INR: 1.04     PTT - ( 08 May 2018 07:00 )  PTT:45.8 SEC    PHYSICAL EXAM  Vital Signs Last 24 Hrs  T(C): 36.7 (08 May 2018 05:25), Max: 36.7 (08 May 2018 05:25)  T(F): 98 (08 May 2018 05:25), Max: 98 (08 May 2018 05:25)  HR: 55 (08 May 2018 05:25) (55 - 78)  BP: 122/75 (08 May 2018 05:25) (122/75 - 135/85)  RR: 18 (08 May 2018 05:25) (18 - 18)  SpO2: 99% (08 May 2018 05:25) (99% - 99%)    HEENT:   Normal oral mucosa, PERRL, EOMI	  Lymphatic: No obvious lymphadenopathy , no edema  Cardiovascular: Normal S1 S2, No JVD, 1/6 SURESH murmur, Peripheral pulses palpable 2+ bilaterally  Respiratory: Lungs clear to auscultation, normal effort 	  Gastrointestinal:  Soft, Non-tender, + BS	  Skin: No rashes,  No cyanosis, warm to touch  Musculoskeletal: Normal range of motion, normal strength  Psychiatry:  Appropriate Mood & affect     TELEMETRY: NSR    ASSESSMENT/PLAN: 	57 year old Male with Hx of HTN, DM admitted with CP, NSTEMI    - cont heparin gtt  - Cont Asa, statin, BB  - check Echo  - LHC today    Audra Alcala PA-C
Patient denies CP, SOB  Review of systems otherwise (-)  	    MEDICATIONS  (STANDING):  amLODIPine   Tablet 10 milliGRAM(s) Oral daily  aspirin enteric coated 81 milliGRAM(s) Oral daily  atorvastatin 40 milliGRAM(s) Oral at bedtime  heparin  Injectable 5000 Unit(s) SubCutaneous every 8 hours  insulin lispro (HumaLOG) corrective regimen sliding scale   SubCutaneous three times a day before meals  insulin lispro (HumaLOG) corrective regimen sliding scale   SubCutaneous at bedtime  insulin NPH human recombinant 25 Unit(s) SubCutaneous before breakfast  insulin NPH human recombinant 15 Unit(s) SubCutaneous at bedtime  metoprolol succinate  milliGRAM(s) Oral daily  predniSONE   Tablet 40 milliGRAM(s) Oral once  ticagrelor 90 milliGRAM(s) Oral two times a day    MEDICATIONS  (PRN):  dextrose Gel 1 Dose(s) Oral once PRN Blood Glucose LESS THAN 70 milliGRAM(s)/deciliter  glucagon  Injectable 1 milliGRAM(s) IntraMuscular once PRN Glucose LESS THAN 70 milligrams/deciliter      LABS:                        15.2   14.58 )-----------( 234      ( 10 May 2018 06:00 )             42.7     137  |  103  |  25<H>  ----------------------------<  127<H>  3.9   |  22  |  1.01    Ca    9.1      10 May 2018 06:00  Phos  3.4     05-10  Mg     2.4     05-10    Creatinine Trend: 1.01<--, 1.28<--, 1.01<--, 0.96<--, 1.07<--, 0.90<--     CARDIAC MARKERS ( 09 May 2018 05:30 )  x     / < 0.06 ng/mL / 62 u/L / 3.53 ng/mL / x        PHYSICAL EXAM  Vital Signs Last 24 Hrs  T(C): 36.9 (10 May 2018 05:36), Max: 36.9 (10 May 2018 05:36)  T(F): 98.5 (10 May 2018 05:36), Max: 98.5 (10 May 2018 05:36)  HR: 62 (10 May 2018 05:36) (62 - 79)  BP: 127/82 (10 May 2018 05:36) (111/63 - 147/81)  BP(mean): 74 (09 May 2018 13:00) (74 - 93)  RR: 16 (10 May 2018 05:36) (14 - 19)  SpO2: 98% (10 May 2018 05:36) (94% - 98%)      HEENT:   Normal oral mucosa, PERRL, EOMI	  Lymphatic: No obvious lymphadenopathy , no edema  Cardiovascular: Normal S1 S2, No JVD, 1/6 SURESH murmur, Peripheral pulses palpable 2+ bilaterally  Respiratory: Lungs clear to auscultation, normal effort 	  Gastrointestinal:  Soft, Non-tender, + BS	  Psychiatry:  Appropriate Mood & affect     TELEMETRY: NSR    < from: Transthoracic Echocardiogram (05.08.18 @ 15:00) >  CONCLUSIONS:  1. Normal mitral valve. Minimal mitral regurgitation.  2. Normal trileaflet aortic valve.  3. Normal left ventricular internal dimensions and wall  thicknesses.  4. Endocardium not well visualized; grossly normal left  ventricular systolic function.  5. Normal right ventricular size and function.  ------------------------------------------------------------------------  Confirmed on  5/8/2018 - 16:20:19 by Carloz España M.D. RPVI    < end of copied text >        ASSESSMENT/PLAN: 	57 year old Male with Hx of HTN, DM admitted with CP, NSTEMI s/p cath 5/8 with 90% pLAD s/p DARRYL, with presumed contrast allergy with Hives and Lip swelling, monitored in CCU last night    --s/p IV steroid/benadryl/pepcid  --Allergy consult appreciated, dose Pred 40 x 1 today.  d/w Fellow.  No objection to DC from their end and OP f/u in their clinic as needed.  -_TTE noted  --Cont Asa, statin, brilinta, Metoprolol  --will arrange f/u in my office in 1 week    Audra Alcala PA-C
Patient denies CP, SOB  Review of systems otherwise (-)  	  MEDICATIONS  (STANDING):  amLODIPine   Tablet 10 milliGRAM(s) Oral daily  aspirin enteric coated 81 milliGRAM(s) Oral daily  atorvastatin 40 milliGRAM(s) Oral at bedtime  enoxaparin Injectable 40 milliGRAM(s) SubCutaneous daily  heparin  Infusion.  Unit(s)/Hr (10 mL/Hr) IV Continuous <Continuous>  insulin lispro (HumaLOG) corrective regimen sliding scale   SubCutaneous three times a day before meals  insulin lispro (HumaLOG) corrective regimen sliding scale   SubCutaneous at bedtime  insulin NPH human recombinant 25 Unit(s) SubCutaneous before breakfast  insulin NPH human recombinant 15 Unit(s) SubCutaneous at bedtime  metoprolol succinate  milliGRAM(s) Oral daily    MEDICATIONS  (PRN):  dextrose Gel 1 Dose(s) Oral once PRN Blood Glucose LESS THAN 70 milliGRAM(s)/deciliter  glucagon  Injectable 1 milliGRAM(s) IntraMuscular once PRN Glucose LESS THAN 70 milligrams/deciliter  heparin  Injectable 5000 Unit(s) IV Push every 6 hours PRN For aPTT less than 40      LABS:                        14.1   7.18  )-----------( 235      ( 07 May 2018 05:05 )             40.8     140  |  105  |  19  ----------------------------<  94  3.6   |  21<L>  |  0.96    Ca    8.7      07 May 2018 05:05  Mg     2.1     05-07      Creatinine Trend: 0.96<--, 1.07<--, 0.90<--, 0.92<--, 1.00<--   PTT - ( 07 May 2018 06:20 )  PTT:51.8 SEC    CARDIAC MARKERS ( 05 May 2018 06:10 )  x     / 0.17 ng/mL / 120 u/L / x     / x      CARDIAC MARKERS ( 04 May 2018 21:03 )  x     / 0.34 ng/mL / 124 u/L / x     / x      CARDIAC MARKERS ( 04 May 2018 15:10 )  x     / 0.10 ng/mL / 103 u/L / 3.67 ng/mL / x          PHYSICAL EXAM  Vital Signs Last 24 Hrs  T(C): 36.6 (07 May 2018 05:38), Max: 36.7 (06 May 2018 15:11)  T(F): 97.8 (07 May 2018 05:38), Max: 98 (06 May 2018 15:11)  HR: 56 (07 May 2018 05:38) (56 - 71)  BP: 117/77 (07 May 2018 05:38) (117/77 - 135/89)  RR: 18 (07 May 2018 05:38) (17 - 18)  SpO2: 97% (07 May 2018 05:38) (97% - 98%)      HEENT:   Normal oral mucosa, PERRL, EOMI	  Lymphatic: No obvious lymphadenopathy , no edema  Cardiovascular: Normal S1 S2, No JVD, 1/6 SURESH murmur, Peripheral pulses palpable 2+ bilaterally  Respiratory: Lungs clear to auscultation, normal effort 	  Gastrointestinal:  Soft, Non-tender, + BS	  Skin: No rashes,  No cyanosis, warm to touch  Musculoskeletal: Normal range of motion, normal strength  Psychiatry:  Appropriate Mood & affect     TELEMETRY: NSR      ASSESSMENT/PLAN: 	57 year old Male with Hx of HTN, DM admitted with CP, NSTEMI    - cont heparin gtt  - Cont Asa, statin, BB  - check Echo  - LHC today    Audra Alcala PA-C
Patient denies CP, SOB  Review of systems otherwise (-)  	  MEDICATIONS:  MEDICATIONS  (STANDING):  amLODIPine   Tablet 10 milliGRAM(s) Oral daily  aspirin enteric coated 81 milliGRAM(s) Oral daily  atorvastatin 40 milliGRAM(s) Oral at bedtime  dextrose 5%. 1000 milliLiter(s) (50 mL/Hr) IV Continuous <Continuous>  dextrose 50% Injectable 12.5 Gram(s) IV Push once  dextrose 50% Injectable 25 Gram(s) IV Push once  dextrose 50% Injectable 25 Gram(s) IV Push once  enoxaparin Injectable 40 milliGRAM(s) SubCutaneous daily  heparin  Infusion.  Unit(s)/Hr (10 mL/Hr) IV Continuous <Continuous>  insulin lispro (HumaLOG) corrective regimen sliding scale   SubCutaneous three times a day before meals  insulin lispro (HumaLOG) corrective regimen sliding scale   SubCutaneous at bedtime  insulin NPH human recombinant 25 Unit(s) SubCutaneous before breakfast  insulin NPH human recombinant 15 Unit(s) SubCutaneous at bedtime  metoprolol succinate  milliGRAM(s) Oral daily  potassium chloride    Tablet ER 20 milliEquivalent(s) Oral every 2 hours      LABS:	 	    CARDIAC MARKERS:  CARDIAC MARKERS ( 05 May 2018 06:10 )  x     / 0.17 ng/mL / 120 u/L / x     / x      CARDIAC MARKERS ( 04 May 2018 21:03 )  x     / 0.34 ng/mL / 124 u/L / x     / x      CARDIAC MARKERS ( 04 May 2018 15:10 )  x     / 0.10 ng/mL / 103 u/L / 3.67 ng/mL / x                                    14.3   6.70  )-----------( 239      ( 06 May 2018 05:14 )             42.2     Hemoglobin: 14.3 g/dL (05-06 @ 05:14)  Hemoglobin: 14.4 g/dL (05-05 @ 06:10)  Hemoglobin: 14.9 g/dL (05-04 @ 15:10)      05-06    139  |  103  |  25<H>  ----------------------------<  133<H>  3.4<L>   |  21<L>  |  1.07    Ca    8.9      06 May 2018 05:14  Phos  2.7     05-05  Mg     2.0     05-06    TPro  6.8  /  Alb  3.9  /  TBili  0.6  /  DBili  x   /  AST  19  /  ALT  24  /  AlkPhos  86  05-05    Creatinine Trend: 1.07<--, 0.90<--, 0.92<--, 1.00<--    COAGS:   PTT - ( 06 May 2018 03:21 )  PTT:65.7 SEC    proBNP:   Lipid Profile:   HgA1c:   TSH:       PHYSICAL EXAM:  T(C): 36.7 (05-06-18 @ 05:16), Max: 36.7 (05-05-18 @ 15:20)  HR: 59 (05-06-18 @ 05:16) (59 - 68)  BP: 126/82 (05-06-18 @ 05:16) (121/86 - 126/82)  RR: 18 (05-06-18 @ 05:16) (17 - 18)  SpO2: 98% (05-06-18 @ 05:16) (98% - 99%)  Wt(kg): --  I&O's Summary        HEENT:   Normal oral mucosa, PERRL, EOMI	  Lymphatic: No obvious lymphadenopathy , no edema  Cardiovascular: Normal S1 S2, No JVD, 1/6 SURESH murmur, Peripheral pulses palpable 2+ bilaterally  Respiratory: Lungs clear to auscultation, normal effort 	  Gastrointestinal:  Soft, Non-tender, + BS	  Skin: No rashes,  No cyanosis, warm to touch  Musculoskeletal: Normal range of motion, normal strength  Psychiatry:  Appropriate Mood & affect     TELEMETRY: 	sinus      ASSESSMENT/PLAN: 	57y Male 58yo male with Hx of HTN DM admitted with CP NSTEMI    - cont heparin gtt  - Cont Asa, statin, BB  - Cath tomorrow  - D/W pt    Josué London MD, FACC
INTERVAL HPI/ OVERNIGHT EVENTS: Patient admitted to CCU for suspected allergic reaction to IV contrast with lip swelling and hives, treated with Benadryl 25 mg IV x 1, Solu-cortef 100 mg IV x1, Pepcid 20 mg IV x 1 given in cath lab, with relief of symptoms. Patient was noted to have new TWI in V2 on ECG #1 post cath and deeper TWI in V2-V6, asymptomatic, no further intervention. Patient admitted to CCU, no acute events otherwise, patient remained stable and asymptomatic.     SUBJECTIVE: Patient seen and examined at bedside this AM.  denies chest pain, shortness of breath, nausea,v     MEDICATIONS  (STANDING):  amLODIPine   Tablet 10 milliGRAM(s) Oral daily  aspirin enteric coated 81 milliGRAM(s) Oral daily  atorvastatin 40 milliGRAM(s) Oral at bedtime  dextrose 5%. 1000 milliLiter(s) (50 mL/Hr) IV Continuous <Continuous>  dextrose 50% Injectable 12.5 Gram(s) IV Push once  dextrose 50% Injectable 25 Gram(s) IV Push once  dextrose 50% Injectable 25 Gram(s) IV Push once  heparin  Injectable 5000 Unit(s) SubCutaneous every 8 hours  insulin lispro (HumaLOG) corrective regimen sliding scale   SubCutaneous three times a day before meals  insulin lispro (HumaLOG) corrective regimen sliding scale   SubCutaneous at bedtime  insulin NPH human recombinant 25 Unit(s) SubCutaneous before breakfast  insulin NPH human recombinant 15 Unit(s) SubCutaneous at bedtime  metoprolol succinate  milliGRAM(s) Oral daily  ticagrelor 90 milliGRAM(s) Oral two times a day    MEDICATIONS  (PRN):  dextrose Gel 1 Dose(s) Oral once PRN Blood Glucose LESS THAN 70 milliGRAM(s)/deciliter  glucagon  Injectable 1 milliGRAM(s) IntraMuscular once PRN Glucose LESS THAN 70 milligrams/deciliter      Vital Signs Last 24 Hrs  T(C): 36.8 (10 May 2018 09:39), Max: 36.9 (10 May 2018 05:36)  T(F): 98.2 (10 May 2018 09:39), Max: 98.5 (10 May 2018 05:36)  HR: 73 (10 May 2018 09:39) (62 - 76)  BP: 140/87 (10 May 2018 09:39) (119/68 - 147/81)  BP(mean): --  RR: 18 (10 May 2018 09:39) (16 - 18)  SpO2: 100% (10 May 2018 09:39) (98% - 100%)    PHYSICAL EXAM:  General: Patient in NAD, non-toxic appearing  HEENT: NC/AT; PERRL, anicteric sclera. No swelling of uvula, no tongue/ lip swelling, no periorbital edema  Neck: Supple, no JVD, no stridor  Respiratory: Lungs CTABL, no wheezing, rhonchi, or rales  Cardiovascular: +S1/S2; RRR; no M/R/G  Gastrointestinal: soft, NT/ND; +BS x4  Extremities: WWP; 2+ peripheral pulses B/L; no LE edema. R radial access soft  Skin: normal color and turgor; no rash  Neurological: Grossly intact    LABS:  05-10    137  |  103  |  25<H>  ----------------------------<  127<H>  3.9   |  22  |  1.01    Ca    9.1      10 May 2018 06:00  Phos  3.4     05-10  Mg     2.4     05-10      Creatinine Trend: 1.01 <--, 1.28 <--, 1.01 <--, 0.96 <--, 1.07 <--, 0.90 <--, 0.92 <--, 1.00 <--                        15.2   14.58 )-----------( 234      ( 10 May 2018 06:00 )             42.7     Urine Studies:  Urinalysis Basic - ( 10 May 2018 09:40 )    Color: PLYEL / Appearance: CLEAR / S.014 / pH: 6.0  Gluc: >1000 / Ketone: NEGATIVE  / Bili: NEGATIVE / Urobili: NORMAL mg/dL   Blood: NEGATIVE / Protein: NEGATIVE mg/dL / Nitrite: NEGATIVE   Leuk Esterase: NEGATIVE / RBC: 0-2 / WBC 0-2   Sq Epi:  / Non Sq Epi:  / Bacteria:         CARDIAC MARKERS ( 09 May 2018 05:30 )  x     / < 0.06 ng/mL / 62 u/L / 3.53 ng/mL / x

## 2019-06-17 ENCOUNTER — INPATIENT (INPATIENT)
Facility: HOSPITAL | Age: 59
LOS: 1 days | Discharge: HOME | End: 2019-06-19
Attending: INTERNAL MEDICINE | Admitting: INTERNAL MEDICINE
Payer: COMMERCIAL

## 2019-06-17 VITALS
SYSTOLIC BLOOD PRESSURE: 143 MMHG | TEMPERATURE: 98 F | DIASTOLIC BLOOD PRESSURE: 88 MMHG | OXYGEN SATURATION: 99 % | RESPIRATION RATE: 16 BRPM | HEART RATE: 76 BPM

## 2019-06-17 DIAGNOSIS — Z29.9 ENCOUNTER FOR PROPHYLACTIC MEASURES, UNSPECIFIED: ICD-10-CM

## 2019-06-17 DIAGNOSIS — R94.39 ABNORMAL RESULT OF OTHER CARDIOVASCULAR FUNCTION STUDY: ICD-10-CM

## 2019-06-17 DIAGNOSIS — E11.9 TYPE 2 DIABETES MELLITUS WITHOUT COMPLICATIONS: ICD-10-CM

## 2019-06-17 DIAGNOSIS — I10 ESSENTIAL (PRIMARY) HYPERTENSION: ICD-10-CM

## 2019-06-17 DIAGNOSIS — E78.5 HYPERLIPIDEMIA, UNSPECIFIED: ICD-10-CM

## 2019-06-17 DIAGNOSIS — Z90.49 ACQUIRED ABSENCE OF OTHER SPECIFIED PARTS OF DIGESTIVE TRACT: Chronic | ICD-10-CM

## 2019-06-17 PROBLEM — E13.10 OTHER SPECIFIED DIABETES MELLITUS WITH KETOACIDOSIS WITHOUT COMA: Chronic | Status: ACTIVE | Noted: 2018-05-04

## 2019-06-17 LAB
ALBUMIN SERPL ELPH-MCNC: 4.6 G/DL — SIGNIFICANT CHANGE UP (ref 3.3–5)
ALP SERPL-CCNC: 106 U/L — SIGNIFICANT CHANGE UP (ref 40–120)
ALT FLD-CCNC: 28 U/L — SIGNIFICANT CHANGE UP (ref 4–41)
ANION GAP SERPL CALC-SCNC: 13 MMO/L — SIGNIFICANT CHANGE UP (ref 7–14)
APTT BLD: 33 SEC — SIGNIFICANT CHANGE UP (ref 27.5–36.3)
AST SERPL-CCNC: 17 U/L — SIGNIFICANT CHANGE UP (ref 4–40)
BASOPHILS # BLD AUTO: 0.05 K/UL — SIGNIFICANT CHANGE UP (ref 0–0.2)
BASOPHILS NFR BLD AUTO: 0.6 % — SIGNIFICANT CHANGE UP (ref 0–2)
BILIRUB SERPL-MCNC: 0.6 MG/DL — SIGNIFICANT CHANGE UP (ref 0.2–1.2)
BUN SERPL-MCNC: 24 MG/DL — HIGH (ref 7–23)
CALCIUM SERPL-MCNC: 9.9 MG/DL — SIGNIFICANT CHANGE UP (ref 8.4–10.5)
CHLORIDE SERPL-SCNC: 100 MMOL/L — SIGNIFICANT CHANGE UP (ref 98–107)
CO2 SERPL-SCNC: 26 MMOL/L — SIGNIFICANT CHANGE UP (ref 22–31)
CREAT SERPL-MCNC: 1.03 MG/DL — SIGNIFICANT CHANGE UP (ref 0.5–1.3)
EOSINOPHIL # BLD AUTO: 0.44 K/UL — SIGNIFICANT CHANGE UP (ref 0–0.5)
EOSINOPHIL NFR BLD AUTO: 5.4 % — SIGNIFICANT CHANGE UP (ref 0–6)
GLUCOSE SERPL-MCNC: 174 MG/DL — HIGH (ref 70–99)
HCT VFR BLD CALC: 42.1 % — SIGNIFICANT CHANGE UP (ref 39–50)
HGB BLD-MCNC: 14.6 G/DL — SIGNIFICANT CHANGE UP (ref 13–17)
IMM GRANULOCYTES NFR BLD AUTO: 0.4 % — SIGNIFICANT CHANGE UP (ref 0–1.5)
INR BLD: 0.97 — SIGNIFICANT CHANGE UP (ref 0.88–1.17)
LYMPHOCYTES # BLD AUTO: 1.67 K/UL — SIGNIFICANT CHANGE UP (ref 1–3.3)
LYMPHOCYTES # BLD AUTO: 20.4 % — SIGNIFICANT CHANGE UP (ref 13–44)
MCHC RBC-ENTMCNC: 28.9 PG — SIGNIFICANT CHANGE UP (ref 27–34)
MCHC RBC-ENTMCNC: 34.7 % — SIGNIFICANT CHANGE UP (ref 32–36)
MCV RBC AUTO: 83.4 FL — SIGNIFICANT CHANGE UP (ref 80–100)
MONOCYTES # BLD AUTO: 0.45 K/UL — SIGNIFICANT CHANGE UP (ref 0–0.9)
MONOCYTES NFR BLD AUTO: 5.5 % — SIGNIFICANT CHANGE UP (ref 2–14)
NEUTROPHILS # BLD AUTO: 5.54 K/UL — SIGNIFICANT CHANGE UP (ref 1.8–7.4)
NEUTROPHILS NFR BLD AUTO: 67.7 % — SIGNIFICANT CHANGE UP (ref 43–77)
NRBC # FLD: 0 K/UL — SIGNIFICANT CHANGE UP (ref 0–0)
PLATELET # BLD AUTO: 254 K/UL — SIGNIFICANT CHANGE UP (ref 150–400)
PMV BLD: 12 FL — SIGNIFICANT CHANGE UP (ref 7–13)
POTASSIUM SERPL-MCNC: 3.5 MMOL/L — SIGNIFICANT CHANGE UP (ref 3.5–5.3)
POTASSIUM SERPL-SCNC: 3.5 MMOL/L — SIGNIFICANT CHANGE UP (ref 3.5–5.3)
PROT SERPL-MCNC: 7.9 G/DL — SIGNIFICANT CHANGE UP (ref 6–8.3)
PROTHROM AB SERPL-ACNC: 11.1 SEC — SIGNIFICANT CHANGE UP (ref 9.8–13.1)
RBC # BLD: 5.05 M/UL — SIGNIFICANT CHANGE UP (ref 4.2–5.8)
RBC # FLD: 12.7 % — SIGNIFICANT CHANGE UP (ref 10.3–14.5)
SODIUM SERPL-SCNC: 139 MMOL/L — SIGNIFICANT CHANGE UP (ref 135–145)
TROPONIN T, HIGH SENSITIVITY: 26 NG/L — SIGNIFICANT CHANGE UP (ref ?–14)
WBC # BLD: 8.18 K/UL — SIGNIFICANT CHANGE UP (ref 3.8–10.5)
WBC # FLD AUTO: 8.18 K/UL — SIGNIFICANT CHANGE UP (ref 3.8–10.5)

## 2019-06-17 RX ORDER — DEXTROSE 50 % IN WATER 50 %
25 SYRINGE (ML) INTRAVENOUS ONCE
Refills: 0 | Status: DISCONTINUED | OUTPATIENT
Start: 2019-06-17 | End: 2019-06-19

## 2019-06-17 RX ORDER — DEXTROSE 50 % IN WATER 50 %
15 SYRINGE (ML) INTRAVENOUS ONCE
Refills: 0 | Status: DISCONTINUED | OUTPATIENT
Start: 2019-06-17 | End: 2019-06-19

## 2019-06-17 RX ORDER — LOSARTAN/HYDROCHLOROTHIAZIDE 100MG-25MG
1 TABLET ORAL
Qty: 0 | Refills: 0 | DISCHARGE

## 2019-06-17 RX ORDER — AMLODIPINE BESYLATE 2.5 MG/1
10 TABLET ORAL AT BEDTIME
Refills: 0 | Status: DISCONTINUED | OUTPATIENT
Start: 2019-06-17 | End: 2019-06-19

## 2019-06-17 RX ORDER — ASPIRIN/CALCIUM CARB/MAGNESIUM 324 MG
81 TABLET ORAL DAILY
Refills: 0 | Status: DISCONTINUED | OUTPATIENT
Start: 2019-06-17 | End: 2019-06-19

## 2019-06-17 RX ORDER — HYDROCHLOROTHIAZIDE 25 MG
25 TABLET ORAL DAILY
Refills: 0 | Status: DISCONTINUED | OUTPATIENT
Start: 2019-06-17 | End: 2019-06-19

## 2019-06-17 RX ORDER — TICAGRELOR 90 MG/1
90 TABLET ORAL
Refills: 0 | Status: DISCONTINUED | OUTPATIENT
Start: 2019-06-17 | End: 2019-06-19

## 2019-06-17 RX ORDER — SODIUM CHLORIDE 9 MG/ML
3 INJECTION INTRAMUSCULAR; INTRAVENOUS; SUBCUTANEOUS EVERY 8 HOURS
Refills: 0 | Status: DISCONTINUED | OUTPATIENT
Start: 2019-06-17 | End: 2019-06-19

## 2019-06-17 RX ORDER — INSULIN LISPRO 100/ML
3 VIAL (ML) SUBCUTANEOUS
Refills: 0 | Status: DISCONTINUED | OUTPATIENT
Start: 2019-06-17 | End: 2019-06-19

## 2019-06-17 RX ORDER — INSULIN GLARGINE 100 [IU]/ML
20 INJECTION, SOLUTION SUBCUTANEOUS AT BEDTIME
Refills: 0 | Status: DISCONTINUED | OUTPATIENT
Start: 2019-06-17 | End: 2019-06-19

## 2019-06-17 RX ORDER — ATORVASTATIN CALCIUM 80 MG/1
40 TABLET, FILM COATED ORAL AT BEDTIME
Refills: 0 | Status: DISCONTINUED | OUTPATIENT
Start: 2019-06-17 | End: 2019-06-19

## 2019-06-17 RX ORDER — INSULIN LISPRO 100/ML
VIAL (ML) SUBCUTANEOUS AT BEDTIME
Refills: 0 | Status: DISCONTINUED | OUTPATIENT
Start: 2019-06-17 | End: 2019-06-19

## 2019-06-17 RX ORDER — HEPARIN SODIUM 5000 [USP'U]/ML
5000 INJECTION INTRAVENOUS; SUBCUTANEOUS EVERY 12 HOURS
Refills: 0 | Status: DISCONTINUED | OUTPATIENT
Start: 2019-06-17 | End: 2019-06-19

## 2019-06-17 RX ORDER — METOPROLOL TARTRATE 50 MG
1 TABLET ORAL
Qty: 0 | Refills: 0 | DISCHARGE

## 2019-06-17 RX ORDER — INSULIN LISPRO 100/ML
VIAL (ML) SUBCUTANEOUS
Refills: 0 | Status: DISCONTINUED | OUTPATIENT
Start: 2019-06-17 | End: 2019-06-19

## 2019-06-17 RX ORDER — GLUCAGON INJECTION, SOLUTION 0.5 MG/.1ML
1 INJECTION, SOLUTION SUBCUTANEOUS ONCE
Refills: 0 | Status: DISCONTINUED | OUTPATIENT
Start: 2019-06-17 | End: 2019-06-19

## 2019-06-17 RX ORDER — METOPROLOL TARTRATE 50 MG
100 TABLET ORAL DAILY
Refills: 0 | Status: DISCONTINUED | OUTPATIENT
Start: 2019-06-18 | End: 2019-06-19

## 2019-06-17 RX ORDER — LOSARTAN POTASSIUM 100 MG/1
100 TABLET, FILM COATED ORAL DAILY
Refills: 0 | Status: DISCONTINUED | OUTPATIENT
Start: 2019-06-17 | End: 2019-06-19

## 2019-06-17 RX ORDER — SODIUM CHLORIDE 9 MG/ML
1000 INJECTION, SOLUTION INTRAVENOUS
Refills: 0 | Status: DISCONTINUED | OUTPATIENT
Start: 2019-06-17 | End: 2019-06-19

## 2019-06-17 RX ORDER — DEXTROSE 50 % IN WATER 50 %
12.5 SYRINGE (ML) INTRAVENOUS ONCE
Refills: 0 | Status: DISCONTINUED | OUTPATIENT
Start: 2019-06-17 | End: 2019-06-19

## 2019-06-17 RX ADMIN — Medication 2: at 23:49

## 2019-06-17 RX ADMIN — AMLODIPINE BESYLATE 10 MILLIGRAM(S): 2.5 TABLET ORAL at 23:43

## 2019-06-17 RX ADMIN — SODIUM CHLORIDE 3 MILLILITER(S): 9 INJECTION INTRAMUSCULAR; INTRAVENOUS; SUBCUTANEOUS at 21:07

## 2019-06-17 RX ADMIN — Medication 50 MILLIGRAM(S): at 21:07

## 2019-06-17 NOTE — H&P ADULT - NSICDXPASTMEDICALHX_GEN_ALL_CORE_FT
PAST MEDICAL HISTORY:  Diabetes     DKA (diabetic ketoacidoses)     HLD (hyperlipidemia)     HTN (hypertension)

## 2019-06-17 NOTE — ED PROVIDER NOTE - OBJECTIVE STATEMENT
Patient is 58yM with PMH hld, htn, DM, CAD with stent presenting with abnormal nuclear stress test today outpatient, sent in for steroid course for IV contrast allergy and then cardiac cath.

## 2019-06-17 NOTE — ED PROVIDER NOTE - ATTENDING CONTRIBUTION TO CARE
luisa: hx stent one year ago, on brilinta. had positive stress today and was to be admitted for cath. however, pt has itching last year after cath, so Dr Mandujano will admit pt and start pre-medication before. cath    prednisone 50mg 13 hrs before luisa: hx stent one year ago, on brilinta. had positive stress today and was to be admitted for cath. however, pt has itching last year after cath, so Dr Mandujano will admit pt and start pre-medication before cath  as per cardiology protocol.   No acute sx. exam unremarkable.

## 2019-06-17 NOTE — H&P ADULT - ATTENDING COMMENTS
Patient seen and examined, agree with above assessment and plan as transcribed above.    - Premedicate per interventional cardiology  - Cath tomorrow    Josué London MD, [facility]  BEEPER [phone]

## 2019-06-17 NOTE — H&P ADULT - NSHPLABSRESULTS_GEN_ALL_CORE
14.6   8.18  )-----------( 254      ( 17 Jun 2019 16:00 )             42.1     06-17    139  |  100  |  24<H>  ----------------------------<  174<H>  3.5   |  26  |  1.03    Ca    9.9      17 Jun 2019 16:00    TPro  7.9  /  Alb  4.6  /  TBili  0.6  /  DBili  x   /  AST  17  /  ALT  28  /  AlkPhos  106  06-17    Troponin T, High Sensitivity (06.17.19 @ 16:00)    Troponin T, High Sensitivity: 26: ---------------------***PLEASE NOTE***----------------------    EKG- NSR HR 70

## 2019-06-17 NOTE — H&P ADULT - HISTORY OF PRESENT ILLNESS
58 Yro M w/ pmhx of HLD, HTN, DM, CAD x1 DARRYL 05/2018 was sent to by cardiologist for abnormal NST showing anterior wall ischemia and for premedication for contrast allergy prior to Cath in am. Pt denies chest pain, SOB, CASE, palpitations, diaphoresis, lightheadedness, dizziness, syncope, fever chills, malaise, myalgias, anorexia, abd pain, N/V/C/D,  cough, and wheezing.

## 2019-06-17 NOTE — ED ADULT NURSE REASSESSMENT NOTE - NS ED NURSE REASSESS COMMENT FT1
Handoff report received from DYLAN ADAME, patient A&Ox4, respirations even and unlabored, patient appears well, denies chest pain, SOB, LOC, appears well. Patient designated to CDU as boarder, reports given to CDU DYLAN Reyes.

## 2019-06-17 NOTE — H&P ADULT - PROBLEM SELECTOR PLAN 1
- AM LABS  - CATH IN AM   - prednisone ordered to premedicate for contast allergy   - interventional cardiology recs appreciated     - f/u MD note

## 2019-06-17 NOTE — ED ADULT TRIAGE NOTE - CHIEF COMPLAINT QUOTE
Pt had abnormal nuclear stress test, was told to come to ER for possible blockage in cardiac stent (placed last year).  Pt denies chest pain/shortness of breath.

## 2019-06-17 NOTE — H&P ADULT - ASSESSMENT
58 Yro M w/ pmhx of HLD, HTN, DM, CAD x1 DARRYL 05/2018 was sent to by cardiologist for abnormal NST showing anterior wall ischemia and for premedication for contrast allergy prior to Cath in am.

## 2019-06-17 NOTE — ED PROVIDER NOTE - CLINICAL SUMMARY MEDICAL DECISION MAKING FREE TEXT BOX
luisa: 57 yo male pt with known hx of CAD and known hx of IV contrast reaction presents with abnorml stress and need for cardiac cath. Protocols discussed with cardiology and pt admitted for pretreatmet before cardiac cath.

## 2019-06-17 NOTE — H&P ADULT - NSHPSOCIALHISTORY_GEN_ALL_CORE
Tobacco Usage:  (x ) never smoked   ( ) former smoker  ( ) current smoker; Packs per day:   Alcohol Usage: (x ) none  ( ) occasional ( ) 2-3 times a week ( ) daily; Last drink:   Recreational drugs (x ) None  Lives with wife   Denies Recent

## 2019-06-18 LAB
ANION GAP SERPL CALC-SCNC: 13 MMO/L — SIGNIFICANT CHANGE UP (ref 7–14)
ANION GAP SERPL CALC-SCNC: 17 MMO/L — HIGH (ref 7–14)
APTT BLD: 31.2 SEC — SIGNIFICANT CHANGE UP (ref 27.5–36.3)
BUN SERPL-MCNC: 23 MG/DL — SIGNIFICANT CHANGE UP (ref 7–23)
BUN SERPL-MCNC: 30 MG/DL — HIGH (ref 7–23)
CALCIUM SERPL-MCNC: 8.9 MG/DL — SIGNIFICANT CHANGE UP (ref 8.4–10.5)
CALCIUM SERPL-MCNC: 9.1 MG/DL — SIGNIFICANT CHANGE UP (ref 8.4–10.5)
CHLORIDE SERPL-SCNC: 96 MMOL/L — LOW (ref 98–107)
CHLORIDE SERPL-SCNC: 99 MMOL/L — SIGNIFICANT CHANGE UP (ref 98–107)
CHOLEST SERPL-MCNC: 200 MG/DL — HIGH (ref 120–199)
CO2 SERPL-SCNC: 20 MMOL/L — LOW (ref 22–31)
CO2 SERPL-SCNC: 20 MMOL/L — LOW (ref 22–31)
CREAT SERPL-MCNC: 0.98 MG/DL — SIGNIFICANT CHANGE UP (ref 0.5–1.3)
CREAT SERPL-MCNC: 1.02 MG/DL — SIGNIFICANT CHANGE UP (ref 0.5–1.3)
GLUCOSE BLDC GLUCOMTR-MCNC: 304 MG/DL — HIGH (ref 70–99)
GLUCOSE BLDC GLUCOMTR-MCNC: 428 MG/DL — HIGH (ref 70–99)
GLUCOSE BLDC GLUCOMTR-MCNC: 439 MG/DL — HIGH (ref 70–99)
GLUCOSE BLDC GLUCOMTR-MCNC: 455 MG/DL — CRITICAL HIGH (ref 70–99)
GLUCOSE SERPL-MCNC: 382 MG/DL — HIGH (ref 70–99)
GLUCOSE SERPL-MCNC: 474 MG/DL — CRITICAL HIGH (ref 70–99)
HBA1C BLD-MCNC: 11.5 % — HIGH (ref 4–5.6)
HCT VFR BLD CALC: 41.1 % — SIGNIFICANT CHANGE UP (ref 39–50)
HCV AB S/CO SERPL IA: 0.13 S/CO — SIGNIFICANT CHANGE UP (ref 0–0.99)
HCV AB SERPL-IMP: SIGNIFICANT CHANGE UP
HDLC SERPL-MCNC: 43 MG/DL — SIGNIFICANT CHANGE UP (ref 35–55)
HGB BLD-MCNC: 14.3 G/DL — SIGNIFICANT CHANGE UP (ref 13–17)
INR BLD: 0.99 — SIGNIFICANT CHANGE UP (ref 0.88–1.17)
LIPID PNL WITH DIRECT LDL SERPL: 159 MG/DL — SIGNIFICANT CHANGE UP
MAGNESIUM SERPL-MCNC: 2.1 MG/DL — SIGNIFICANT CHANGE UP (ref 1.6–2.6)
MAGNESIUM SERPL-MCNC: 2.1 MG/DL — SIGNIFICANT CHANGE UP (ref 1.6–2.6)
MCHC RBC-ENTMCNC: 28.9 PG — SIGNIFICANT CHANGE UP (ref 27–34)
MCHC RBC-ENTMCNC: 34.8 % — SIGNIFICANT CHANGE UP (ref 32–36)
MCV RBC AUTO: 83 FL — SIGNIFICANT CHANGE UP (ref 80–100)
NRBC # FLD: 0.02 K/UL — SIGNIFICANT CHANGE UP (ref 0–0)
NT-PROBNP SERPL-SCNC: 144.9 PG/ML — SIGNIFICANT CHANGE UP
PHOSPHATE SERPL-MCNC: 2.5 MG/DL — SIGNIFICANT CHANGE UP (ref 2.5–4.5)
PHOSPHATE SERPL-MCNC: 2.8 MG/DL — SIGNIFICANT CHANGE UP (ref 2.5–4.5)
PLATELET # BLD AUTO: 252 K/UL — SIGNIFICANT CHANGE UP (ref 150–400)
PMV BLD: 12 FL — SIGNIFICANT CHANGE UP (ref 7–13)
POTASSIUM SERPL-MCNC: 4.2 MMOL/L — SIGNIFICANT CHANGE UP (ref 3.5–5.3)
POTASSIUM SERPL-MCNC: 4.3 MMOL/L — SIGNIFICANT CHANGE UP (ref 3.5–5.3)
POTASSIUM SERPL-SCNC: 4.2 MMOL/L — SIGNIFICANT CHANGE UP (ref 3.5–5.3)
POTASSIUM SERPL-SCNC: 4.3 MMOL/L — SIGNIFICANT CHANGE UP (ref 3.5–5.3)
PROTHROM AB SERPL-ACNC: 11.3 SEC — SIGNIFICANT CHANGE UP (ref 9.8–13.1)
RBC # BLD: 4.95 M/UL — SIGNIFICANT CHANGE UP (ref 4.2–5.8)
RBC # FLD: 12.6 % — SIGNIFICANT CHANGE UP (ref 10.3–14.5)
SODIUM SERPL-SCNC: 132 MMOL/L — LOW (ref 135–145)
SODIUM SERPL-SCNC: 133 MMOL/L — LOW (ref 135–145)
TRIGL SERPL-MCNC: 62 MG/DL — SIGNIFICANT CHANGE UP (ref 10–149)
TROPONIN T, HIGH SENSITIVITY: 10 NG/L — SIGNIFICANT CHANGE UP (ref ?–14)
TSH SERPL-MCNC: 0.35 UIU/ML — SIGNIFICANT CHANGE UP (ref 0.27–4.2)
WBC # BLD: 10.33 K/UL — SIGNIFICANT CHANGE UP (ref 3.8–10.5)
WBC # FLD AUTO: 10.33 K/UL — SIGNIFICANT CHANGE UP (ref 3.8–10.5)

## 2019-06-18 PROCEDURE — 93010 ELECTROCARDIOGRAM REPORT: CPT

## 2019-06-18 RX ORDER — SODIUM CHLORIDE 9 MG/ML
500 INJECTION INTRAMUSCULAR; INTRAVENOUS; SUBCUTANEOUS
Refills: 0 | Status: DISCONTINUED | OUTPATIENT
Start: 2019-06-18 | End: 2019-06-19

## 2019-06-18 RX ADMIN — HEPARIN SODIUM 5000 UNIT(S): 5000 INJECTION INTRAVENOUS; SUBCUTANEOUS at 07:35

## 2019-06-18 RX ADMIN — Medication 81 MILLIGRAM(S): at 11:37

## 2019-06-18 RX ADMIN — INSULIN GLARGINE 20 UNIT(S): 100 INJECTION, SOLUTION SUBCUTANEOUS at 21:21

## 2019-06-18 RX ADMIN — Medication 4: at 17:30

## 2019-06-18 RX ADMIN — SODIUM CHLORIDE 3 MILLILITER(S): 9 INJECTION INTRAMUSCULAR; INTRAVENOUS; SUBCUTANEOUS at 06:38

## 2019-06-18 RX ADMIN — TICAGRELOR 90 MILLIGRAM(S): 90 TABLET ORAL at 07:36

## 2019-06-18 RX ADMIN — Medication 3 UNIT(S): at 17:30

## 2019-06-18 RX ADMIN — ATORVASTATIN CALCIUM 40 MILLIGRAM(S): 80 TABLET, FILM COATED ORAL at 21:21

## 2019-06-18 RX ADMIN — Medication 4: at 07:49

## 2019-06-18 RX ADMIN — AMLODIPINE BESYLATE 10 MILLIGRAM(S): 2.5 TABLET ORAL at 21:21

## 2019-06-18 RX ADMIN — SODIUM CHLORIDE 3 MILLILITER(S): 9 INJECTION INTRAMUSCULAR; INTRAVENOUS; SUBCUTANEOUS at 21:15

## 2019-06-18 RX ADMIN — TICAGRELOR 90 MILLIGRAM(S): 90 TABLET ORAL at 17:29

## 2019-06-18 RX ADMIN — Medication 4: at 21:21

## 2019-06-18 RX ADMIN — SODIUM CHLORIDE 75 MILLILITER(S): 9 INJECTION INTRAMUSCULAR; INTRAVENOUS; SUBCUTANEOUS at 12:30

## 2019-06-18 RX ADMIN — HEPARIN SODIUM 5000 UNIT(S): 5000 INJECTION INTRAVENOUS; SUBCUTANEOUS at 17:40

## 2019-06-18 RX ADMIN — Medication 50 MILLIGRAM(S): at 04:19

## 2019-06-18 RX ADMIN — LOSARTAN POTASSIUM 100 MILLIGRAM(S): 100 TABLET, FILM COATED ORAL at 17:29

## 2019-06-18 RX ADMIN — SODIUM CHLORIDE 3 MILLILITER(S): 9 INJECTION INTRAMUSCULAR; INTRAVENOUS; SUBCUTANEOUS at 16:15

## 2019-06-18 RX ADMIN — Medication 4: at 13:08

## 2019-06-18 RX ADMIN — Medication 50 MILLIGRAM(S): at 09:38

## 2019-06-18 RX ADMIN — Medication 3 UNIT(S): at 13:09

## 2019-06-18 RX ADMIN — Medication 25 MILLIGRAM(S): at 17:29

## 2019-06-18 NOTE — DISCHARGE NOTE PROVIDER - NSDCFUADDAPPT_GEN_ALL_CORE_FT
f/u in EP office on Friday June 28th at 9:15am  f/u Dr Ashley(Cardio) in the office Fri June 28th at 915 AM as scheduled

## 2019-06-18 NOTE — DISCHARGE NOTE PROVIDER - HOSPITAL COURSE
58 Yro M w/ pmhx of HLD, HTn, DM, CAD x1 DARRYL 05/2018 was sent to by cardiologsit for abnormal NST, showing anterior wall ischemia. .         Abnormal stress test    - for cardiac cath     - premedication with prednisone for contast allergy     - LHCpLAD 99% x1 DARRYL, LCx luminal dz, OM 60%, RCA 70%, RRA accessed    - severe in-stent restenosis in prox LAD stent    -Severe LAD stenosis successfully intervened on with DARRYL with post stent IVUS imaging showing good stent apposition    - ASA, brilinta     -pt. with small vessel CAD in RCA which I will medically manage for now        HTN, HLD, CAD DARRYL 05/2018    - ASA, brilinta     - metoprolol. norvasc, HCTZ, losartan     - lipitor             DM     - hgba1c 11.5    - lantus, pre meals, sliding scale 58 Yro M w/ pmhx of HLD, HTn, DM, CAD x1 DARRYL 05/2018 was sent to by cardiologsit for abnormal NST, showing anterior wall ischemia. .         Abnormal stress test    - for cardiac cath     - premedication with prednisone for contast allergy     - LHCpLAD 99% x1 DARRYL, LCx luminal dz, OM 60%, RCA 70%, RRA accessed    - severe in-stent restenosis in prox LAD stent    -Severe LAD stenosis successfully intervened on with DARRYL with post stent IVUS imaging showing good stent apposition    - ASA, brilinta     -pt. with small vessel CAD in RCA which I will medically manage for now        HTN, HLD, CAD DARRYL 05/2018    - ASA, brilinta     - metoprolol. norvasc, HCTZ, losartan     - lipitor             DM     - hgba1c 11.5    - lantus, pre meals, sliding scale    - Endocrine consulted and recommended switch to basal and bolus insulin upon discharge home for better glycemic control.        Patient is stable for discharge home as per Dr. Mandujano.

## 2019-06-18 NOTE — DISCHARGE NOTE PROVIDER - NSDCCPCAREPLAN_GEN_ALL_CORE_FT
PRINCIPAL DISCHARGE DIAGNOSIS  Diagnosis: CAD (coronary artery disease)  Assessment and Plan of Treatment: You had stents placed. Continue aspirin and plavix. Follow up with your cardiologist in 1-2 weeks after discharge.      SECONDARY DISCHARGE DIAGNOSES  Diagnosis: HTN (hypertension)  Assessment and Plan of Treatment: Continue your home blood pressure medications    Diagnosis: HLD (hyperlipidemia)  Assessment and Plan of Treatment: Continue statin.    Diagnosis: Diabetes mellitus, type 2  Assessment and Plan of Treatment: Continue your home insulin.   Your hgba1c is 11.6.   Follow up with your endocrinologist/primary care physician for continued care.

## 2019-06-18 NOTE — PROVIDER CONTACT NOTE (CRITICAL VALUE NOTIFICATION) - ASSESSMENT
pt A&Ox4 laying comfortably in bed with serum glucose 474 from STAT BMP obtained earlier for hyperglycemia FS of 455. pt without symptoms of hyperglycemia. FS repeated 1 hour post insulin administration as ordered (4 units of bedtime sliding scale and 20 units of Lantus, see emar) with value of 428 at 22:30, pt without symptoms. Tele KRISTI alcantar. pt A&Ox4 laying comfortably in bed with serum glucose 474 from STAT BMP obtained earlier for hyperglycemia FS of 455. pt without symptoms of hyperglycemia. FS repeated 1 hour post insulin administration at 21:20 as ordered (4 units of bedtime sliding scale and 20 units of Lantus, see emar) with value of 428 at 22:30, pt without symptoms. As per hospital policy, insulin can only be given every 4 hours so next insulin dose can be given at 01:20. Tele KRISTI alcantar.

## 2019-06-18 NOTE — PROVIDER CONTACT NOTE (CRITICAL VALUE NOTIFICATION) - RECOMMENDATIONS
As per Tele PA, give 4 units Humalog insulin as per bedtime sliding scale and 20 units Lantus as ordered. recheck FS in 1 hour. Continuing to monitor. As per Tele PA, give 4 units Humalog insulin as per bedtime sliding scale and 20 units Lantus as ordered. obtain STAT BMP. recheck FS in 1 hour. Continuing to monitor.

## 2019-06-18 NOTE — PROVIDER CONTACT NOTE (CRITICAL VALUE NOTIFICATION) - ACTION/TREATMENT ORDERED:
4 units Humalog insulin given at this time as per bedtime sliding scale and 20 units Lantus given as ordered. will recheck FS in 1 hour. Continuing to monitor. 4 units Humalog insulin given at this time as per bedtime sliding scale and 20 units Lantus given as ordered. STAT BMP to be sent as ordered. will recheck FS in 1 hour. Continuing to monitor.

## 2019-06-18 NOTE — DISCHARGE NOTE PROVIDER - NSDCFUADDINST_GEN_ALL_CORE_FT
No heavy lifting greater than 5-10lbs or repetitive movements with Right wrist for 1 week. No strenuous activity for 3 weeks. Monitor site of procedure for any redness, swelling, bleeding and notify your doctor.

## 2019-06-18 NOTE — DISCHARGE NOTE PROVIDER - CARE PROVIDER_API CALL
Josué London)  Cardiovascular Disease  [address]  [place] [address]  Phone: [phone]  Fax: [phone]  Follow Up Time:

## 2019-06-18 NOTE — CHART NOTE - NSCHARTNOTEFT_GEN_A_CORE
PA note   PT s/p RHC via RRA   Pt resting comfortably without any complaints  VSS   RA site bandage in place C/D/I ,no hematoma or ecchymosis noted   pulse 2+ , hand warm and pink in color.  Marion HERBERT

## 2019-06-18 NOTE — PROVIDER CONTACT NOTE (CRITICAL VALUE NOTIFICATION) - ASSESSMENT
patient A&Ox4 laying comfortably in bed with bedtime , repeat 439. patient without symptoms of hyperglycemia. Patient s/p cardiac cath earlier today, pt with allergy to contrast so PO Prednisone given. Tele KRISTI alcantar.

## 2019-06-19 VITALS
SYSTOLIC BLOOD PRESSURE: 137 MMHG | OXYGEN SATURATION: 100 % | HEART RATE: 68 BPM | RESPIRATION RATE: 17 BRPM | DIASTOLIC BLOOD PRESSURE: 95 MMHG | TEMPERATURE: 98 F

## 2019-06-19 DIAGNOSIS — E11.9 TYPE 2 DIABETES MELLITUS WITHOUT COMPLICATIONS: ICD-10-CM

## 2019-06-19 DIAGNOSIS — I25.10 ATHEROSCLEROTIC HEART DISEASE OF NATIVE CORONARY ARTERY WITHOUT ANGINA PECTORIS: ICD-10-CM

## 2019-06-19 DIAGNOSIS — I10 ESSENTIAL (PRIMARY) HYPERTENSION: ICD-10-CM

## 2019-06-19 LAB
ANION GAP SERPL CALC-SCNC: 13 MMO/L — SIGNIFICANT CHANGE UP (ref 7–14)
BASOPHILS # BLD AUTO: 0.03 K/UL — SIGNIFICANT CHANGE UP (ref 0–0.2)
BASOPHILS NFR BLD AUTO: 0.2 % — SIGNIFICANT CHANGE UP (ref 0–2)
BUN SERPL-MCNC: 29 MG/DL — HIGH (ref 7–23)
CALCIUM SERPL-MCNC: 9.4 MG/DL — SIGNIFICANT CHANGE UP (ref 8.4–10.5)
CHLORIDE SERPL-SCNC: 98 MMOL/L — SIGNIFICANT CHANGE UP (ref 98–107)
CO2 SERPL-SCNC: 24 MMOL/L — SIGNIFICANT CHANGE UP (ref 22–31)
CREAT SERPL-MCNC: 1.02 MG/DL — SIGNIFICANT CHANGE UP (ref 0.5–1.3)
EOSINOPHIL # BLD AUTO: 0.04 K/UL — SIGNIFICANT CHANGE UP (ref 0–0.5)
EOSINOPHIL NFR BLD AUTO: 0.2 % — SIGNIFICANT CHANGE UP (ref 0–6)
GLUCOSE BLDC GLUCOMTR-MCNC: 194 MG/DL — HIGH (ref 70–99)
GLUCOSE BLDC GLUCOMTR-MCNC: 220 MG/DL — HIGH (ref 70–99)
GLUCOSE BLDC GLUCOMTR-MCNC: 248 MG/DL — HIGH (ref 70–99)
GLUCOSE BLDC GLUCOMTR-MCNC: 307 MG/DL — HIGH (ref 70–99)
GLUCOSE BLDC GLUCOMTR-MCNC: 333 MG/DL — HIGH (ref 70–99)
GLUCOSE SERPL-MCNC: 265 MG/DL — HIGH (ref 70–99)
HCT VFR BLD CALC: 39.8 % — SIGNIFICANT CHANGE UP (ref 39–50)
HCT VFR BLD CALC: 40.6 % — SIGNIFICANT CHANGE UP (ref 39–50)
HGB BLD-MCNC: 13.6 G/DL — SIGNIFICANT CHANGE UP (ref 13–17)
HGB BLD-MCNC: 14.2 G/DL — SIGNIFICANT CHANGE UP (ref 13–17)
IMM GRANULOCYTES NFR BLD AUTO: 0.7 % — SIGNIFICANT CHANGE UP (ref 0–1.5)
LYMPHOCYTES # BLD AUTO: 1.69 K/UL — SIGNIFICANT CHANGE UP (ref 1–3.3)
LYMPHOCYTES # BLD AUTO: 9.5 % — LOW (ref 13–44)
MAGNESIUM SERPL-MCNC: 2.4 MG/DL — SIGNIFICANT CHANGE UP (ref 1.6–2.6)
MCHC RBC-ENTMCNC: 28.6 PG — SIGNIFICANT CHANGE UP (ref 27–34)
MCHC RBC-ENTMCNC: 29.6 PG — SIGNIFICANT CHANGE UP (ref 27–34)
MCHC RBC-ENTMCNC: 34.2 % — SIGNIFICANT CHANGE UP (ref 32–36)
MCHC RBC-ENTMCNC: 35 % — SIGNIFICANT CHANGE UP (ref 32–36)
MCV RBC AUTO: 83.8 FL — SIGNIFICANT CHANGE UP (ref 80–100)
MCV RBC AUTO: 84.6 FL — SIGNIFICANT CHANGE UP (ref 80–100)
MONOCYTES # BLD AUTO: 0.92 K/UL — HIGH (ref 0–0.9)
MONOCYTES NFR BLD AUTO: 5.2 % — SIGNIFICANT CHANGE UP (ref 2–14)
NEUTROPHILS # BLD AUTO: 15 K/UL — HIGH (ref 1.8–7.4)
NEUTROPHILS NFR BLD AUTO: 84.2 % — HIGH (ref 43–77)
NRBC # FLD: 0 K/UL — SIGNIFICANT CHANGE UP (ref 0–0)
NRBC # FLD: 0 K/UL — SIGNIFICANT CHANGE UP (ref 0–0)
PHOSPHATE SERPL-MCNC: 3.1 MG/DL — SIGNIFICANT CHANGE UP (ref 2.5–4.5)
PLATELET # BLD AUTO: 241 K/UL — SIGNIFICANT CHANGE UP (ref 150–400)
PLATELET # BLD AUTO: 256 K/UL — SIGNIFICANT CHANGE UP (ref 150–400)
PMV BLD: 12.1 FL — SIGNIFICANT CHANGE UP (ref 7–13)
PMV BLD: 12.1 FL — SIGNIFICANT CHANGE UP (ref 7–13)
POTASSIUM SERPL-MCNC: 3.7 MMOL/L — SIGNIFICANT CHANGE UP (ref 3.5–5.3)
POTASSIUM SERPL-SCNC: 3.7 MMOL/L — SIGNIFICANT CHANGE UP (ref 3.5–5.3)
RBC # BLD: 4.75 M/UL — SIGNIFICANT CHANGE UP (ref 4.2–5.8)
RBC # BLD: 4.8 M/UL — SIGNIFICANT CHANGE UP (ref 4.2–5.8)
RBC # FLD: 12.6 % — SIGNIFICANT CHANGE UP (ref 10.3–14.5)
RBC # FLD: 12.7 % — SIGNIFICANT CHANGE UP (ref 10.3–14.5)
SODIUM SERPL-SCNC: 135 MMOL/L — SIGNIFICANT CHANGE UP (ref 135–145)
WBC # BLD: 16.59 K/UL — HIGH (ref 3.8–10.5)
WBC # BLD: 17.8 K/UL — HIGH (ref 3.8–10.5)
WBC # FLD AUTO: 16.59 K/UL — HIGH (ref 3.8–10.5)
WBC # FLD AUTO: 17.8 K/UL — HIGH (ref 3.8–10.5)

## 2019-06-19 RX ORDER — INSULIN GLARGINE 100 [IU]/ML
25 INJECTION, SOLUTION SUBCUTANEOUS
Qty: 10 | Refills: 0
Start: 2019-06-19 | End: 2019-07-18

## 2019-06-19 RX ORDER — INSULIN GLARGINE 100 [IU]/ML
25 INJECTION, SOLUTION SUBCUTANEOUS AT BEDTIME
Refills: 0 | Status: DISCONTINUED | OUTPATIENT
Start: 2019-06-19 | End: 2019-06-19

## 2019-06-19 RX ORDER — INSULIN LISPRO 100/ML
2 VIAL (ML) SUBCUTANEOUS ONCE
Refills: 0 | Status: COMPLETED | OUTPATIENT
Start: 2019-06-19 | End: 2019-06-19

## 2019-06-19 RX ORDER — ISOPROPYL ALCOHOL, BENZOCAINE .7; .06 ML/ML; ML/ML
1 SWAB TOPICAL
Qty: 100 | Refills: 1
Start: 2019-06-19 | End: 2019-08-07

## 2019-06-19 RX ORDER — INSULIN LISPRO 100/ML
7 VIAL (ML) SUBCUTANEOUS
Qty: 10 | Refills: 0
Start: 2019-06-19 | End: 2019-07-18

## 2019-06-19 RX ORDER — INSULIN LISPRO 100/ML
7 VIAL (ML) SUBCUTANEOUS
Refills: 0 | Status: DISCONTINUED | OUTPATIENT
Start: 2019-06-19 | End: 2019-06-19

## 2019-06-19 RX ORDER — TICAGRELOR 90 MG/1
1 TABLET ORAL
Qty: 60 | Refills: 0
Start: 2019-06-19 | End: 2019-07-18

## 2019-06-19 RX ADMIN — LOSARTAN POTASSIUM 100 MILLIGRAM(S): 100 TABLET, FILM COATED ORAL at 05:46

## 2019-06-19 RX ADMIN — Medication 100 MILLIGRAM(S): at 05:45

## 2019-06-19 RX ADMIN — TICAGRELOR 90 MILLIGRAM(S): 90 TABLET ORAL at 05:46

## 2019-06-19 RX ADMIN — TICAGRELOR 90 MILLIGRAM(S): 90 TABLET ORAL at 17:08

## 2019-06-19 RX ADMIN — Medication 2: at 08:08

## 2019-06-19 RX ADMIN — Medication 25 MILLIGRAM(S): at 05:46

## 2019-06-19 RX ADMIN — Medication 81 MILLIGRAM(S): at 17:08

## 2019-06-19 RX ADMIN — Medication 2: at 12:29

## 2019-06-19 RX ADMIN — Medication 3 UNIT(S): at 08:08

## 2019-06-19 RX ADMIN — HEPARIN SODIUM 5000 UNIT(S): 5000 INJECTION INTRAVENOUS; SUBCUTANEOUS at 05:46

## 2019-06-19 RX ADMIN — Medication 1: at 17:08

## 2019-06-19 RX ADMIN — SODIUM CHLORIDE 3 MILLILITER(S): 9 INJECTION INTRAMUSCULAR; INTRAVENOUS; SUBCUTANEOUS at 13:55

## 2019-06-19 RX ADMIN — SODIUM CHLORIDE 3 MILLILITER(S): 9 INJECTION INTRAMUSCULAR; INTRAVENOUS; SUBCUTANEOUS at 05:45

## 2019-06-19 RX ADMIN — Medication 2 UNIT(S): at 02:15

## 2019-06-19 RX ADMIN — Medication 7 UNIT(S): at 17:07

## 2019-06-19 RX ADMIN — Medication 3 UNIT(S): at 12:29

## 2019-06-19 NOTE — CONSULT NOTE ADULT - ASSESSMENT
58 Yro M w/ pmhx of HLD, HTN, DM, CAD x1 DARRYL 05/2018 was sent to by cardiologist for abnormal NST showing anterior wall ischemia and for premedication for contrast allergy prior to Cath in am.
patient seen and chart reviewd, full note to follow.  Assessment  DMT2: 58y Male with DM T2 with hyperglycemia, A1C 11% , was on mixed insulin at home, blood sugars running high, no hypoglycemic episode,  eating meals,  non compliant with low carb diet.  CAD: on medications, no chest pain, stable, monitored.  HTN: Controlled,  on antihypertensive medications.           Gigi Zacarias MD  Cell: [phone]  Office: [phone]

## 2019-06-19 NOTE — CHART NOTE - NSCHARTNOTEFT_GEN_A_CORE
Informed by nurse that patient's fingerstick was 455. Repeat FS was done 439. Patient was then given bedtime dose of 20 units of Lantus, and 4 units of Humalog. Patient's fingerstick decreased to 428 and then to 333 and then to 307. Will continue to monitor. Patient will need endo consult in AM. Informed by nurse that patient's fingerstick was 455. Repeat FS was done 439. Patient was then given bedtime dose of 20 units of Lantus, and 4 units of Humalog. Patient's fingerstick decreased to 428 and then to 333 and then to 307. Will continue to monitor. Patient will need endo consult in AM.    Addendum: 2 units of Humalog ordered. Will continue to monitor.

## 2019-06-19 NOTE — PROVIDER CONTACT NOTE (OTHER) - ACTION/TREATMENT ORDERED:
As per tele PA administer 2 units of Humalog at this time (see emar), okay to reassess FS prior to breakfast. continuing to monitor.

## 2019-06-19 NOTE — CONSULT NOTE ADULT - PROBLEM SELECTOR RECOMMENDATION 9
uncontrolled DM Type 2   HbA1C 11.5  Recommend endocrinologist evaluation so patient can follow up with them after discharge   will d/w cardiology attending
Will increase Lantus to 25 units at bed time.  Will increase Humalog to 7 units before each meal in addition to Humalog correction scale coverage.  Patient counseled for compliance with consistent low carb diet.

## 2019-06-19 NOTE — CONSULT NOTE ADULT - PROBLEM SELECTOR RECOMMENDATION 2
acceptable  continue to monitor on losartan, amlodipine and metoprolol
On medications,  no chest pain, stable, monitored and followed up by primary  team/cardiology team

## 2019-06-19 NOTE — PROVIDER CONTACT NOTE (OTHER) - RECOMMENDATIONS
As per tele PA administer 2 units of Humalog at this time, okay to reassess FS prior to breakfast. continuing to monitor.

## 2019-06-19 NOTE — PROVIDER CONTACT NOTE (OTHER) - ASSESSMENT
pt A&Ox4 laying comfortably in bed with repeat , pt without symptoms of hyperglycemia. Pt received 4 units of bedtime sliding scale insulin and 20 units of Lantus at 21:20 as ordered. As per hospital policy insulin can only be given every 4 hours. Tele KRISTI Warner aware.

## 2019-06-19 NOTE — PROGRESS NOTE ADULT - PROBLEM SELECTOR PLAN 1
extensive education at bedside done that his DM needs to be far better controlled to make any improvement in his risk for CAD  endocrinologist help appreciated  will need patient to closely follow with endocrinologist after discharge to bring down HbA1C

## 2019-06-19 NOTE — PROGRESS NOTE ADULT - SUBJECTIVE AND OBJECTIVE BOX
EP ATTENDING    tele: NSR, no events    no palpitations, no syncope, no angina    amLODIPine   Tablet 10 milliGRAM(s) Oral at bedtime  aspirin enteric coated 81 milliGRAM(s) Oral daily  atorvastatin 40 milliGRAM(s) Oral at bedtime  dextrose 40% Gel 15 Gram(s) Oral once PRN  dextrose 5%. 1000 milliLiter(s) IV Continuous <Continuous>  dextrose 50% Injectable 12.5 Gram(s) IV Push once  dextrose 50% Injectable 25 Gram(s) IV Push once  dextrose 50% Injectable 25 Gram(s) IV Push once  glucagon  Injectable 1 milliGRAM(s) IntraMuscular once PRN  heparin  Injectable 5000 Unit(s) SubCutaneous every 12 hours  hydrochlorothiazide 25 milliGRAM(s) Oral daily  insulin glargine Injectable (LANTUS) 25 Unit(s) SubCutaneous at bedtime  insulin lispro (HumaLOG) corrective regimen sliding scale   SubCutaneous three times a day before meals  insulin lispro (HumaLOG) corrective regimen sliding scale   SubCutaneous at bedtime  insulin lispro Injectable (HumaLOG) 7 Unit(s) SubCutaneous three times a day before meals  losartan 100 milliGRAM(s) Oral daily  metoprolol succinate  milliGRAM(s) Oral daily  sodium chloride 0.9% lock flush 3 milliLiter(s) IV Push every 8 hours  sodium chloride 0.9%. 500 milliLiter(s) IV Continuous <Continuous>  ticagrelor 90 milliGRAM(s) Oral two times a day                            14.2   16.59 )-----------( 256      ( 19 Jun 2019 11:07 )             40.6       06-19    135  |  98  |  29<H>  ----------------------------<  265<H>  3.7   |  24  |  1.02    Ca    9.4      19 Jun 2019 06:30  Phos  3.1     06-19  Mg     2.4     06-19    TPro  7.9  /  Alb  4.6  /  TBili  0.6  /  DBili  x   /  AST  17  /  ALT  28  /  AlkPhos  106  06-17            T(C): 36.7 (06-19-19 @ 12:26), Max: 36.7 (06-18-19 @ 16:08)  HR: 60 (06-19-19 @ 12:26) (60 - 86)  BP: 117/78 (06-19-19 @ 12:26) (117/78 - 122/71)  RR: 18 (06-19-19 @ 12:26) (16 - 18)  SpO2: 99% (06-19-19 @ 12:26) (97% - 100%)  Wt(kg): --    no JVD  RRR, no murmurs  CTAB  soft nt/nd  no c/c/e    cath: PCI of prox LAD  TSH: Thyroid Stimulating Hormone, Serum: 0.35 uIU/mL (06-18 @ 05:35)  LVEF: unremarkable by recent office echo      A/P) He is a pleasant 59 y/o male PMH CAD s/p PCI of the prox LAD in May 2018. He is now readmitted with chest pain and anterior wall ischemia on NST. EP is now called for periods of bradycardia. The rhythm was sinus, the QRS is narrow, and his TSH is normal. In addition he denies presyncope, syncope, nor exercise tolerance A repeat cath resulted in PCI of a prox LAD in-stent restenosis    -no indication for PPM at this time given that he is asymptomatic, has a narrow QRS, and demonstrates good chronotropic competence during exertion  -d/c planning as per primary team  -f/u in our office on Friday June 28th at 9:15am  -no further inpatient EP workup needed      Kat Arevalo M.D., [facility]  Cardiac Electrophysiology  [facility]  [address] [place] [address], [place] [address]  [email or web address]    office [phone]  pager [phone]
Patient is a 58y old  Male who presents with a chief complaint of abnormal stress test (19 Jun 2019 15:15)      SUBJECTIVE / OVERNIGHT EVENTS: overnight events noted    ROS:  Resp: No cough no sputum production  CVS: No chest pain no palpitations no orthopnea  GI: no N/V/D  : no dysuria, no hematuria  Neuro: no weakness no paresthesias  Heme: No petechiae no easy bruising  Msk: No joint pain no swelling  Skin: No rash no itching        MEDICATIONS  (STANDING):  amLODIPine   Tablet 10 milliGRAM(s) Oral at bedtime  aspirin enteric coated 81 milliGRAM(s) Oral daily  atorvastatin 40 milliGRAM(s) Oral at bedtime  dextrose 5%. 1000 milliLiter(s) (50 mL/Hr) IV Continuous <Continuous>  dextrose 50% Injectable 12.5 Gram(s) IV Push once  dextrose 50% Injectable 25 Gram(s) IV Push once  dextrose 50% Injectable 25 Gram(s) IV Push once  heparin  Injectable 5000 Unit(s) SubCutaneous every 12 hours  hydrochlorothiazide 25 milliGRAM(s) Oral daily  insulin glargine Injectable (LANTUS) 25 Unit(s) SubCutaneous at bedtime  insulin lispro (HumaLOG) corrective regimen sliding scale   SubCutaneous three times a day before meals  insulin lispro (HumaLOG) corrective regimen sliding scale   SubCutaneous at bedtime  insulin lispro Injectable (HumaLOG) 7 Unit(s) SubCutaneous three times a day before meals  losartan 100 milliGRAM(s) Oral daily  metoprolol succinate  milliGRAM(s) Oral daily  sodium chloride 0.9% lock flush 3 milliLiter(s) IV Push every 8 hours  sodium chloride 0.9%. 500 milliLiter(s) (75 mL/Hr) IV Continuous <Continuous>  ticagrelor 90 milliGRAM(s) Oral two times a day    MEDICATIONS  (PRN):  dextrose 40% Gel 15 Gram(s) Oral once PRN Blood Glucose LESS THAN 70 milliGRAM(s)/deciliter  glucagon  Injectable 1 milliGRAM(s) IntraMuscular once PRN Glucose LESS THAN 70 milligrams/deciliter        CAPILLARY BLOOD GLUCOSE      POCT Blood Glucose.: 220 mg/dL (19 Jun 2019 11:49)  POCT Blood Glucose.: 248 mg/dL (19 Jun 2019 07:35)  POCT Blood Glucose.: 307 mg/dL (19 Jun 2019 01:37)  POCT Blood Glucose.: 333 mg/dL (19 Jun 2019 00:34)  POCT Blood Glucose.: 428 mg/dL (18 Jun 2019 22:34)  POCT Blood Glucose.: 439 mg/dL (18 Jun 2019 21:08)  POCT Blood Glucose.: 455 mg/dL (18 Jun 2019 21:06)  POCT Blood Glucose.: 304 mg/dL (18 Jun 2019 16:54)    I&O's Summary      Vital Signs Last 24 Hrs  T(C): 36.7 (19 Jun 2019 12:26), Max: 36.7 (19 Jun 2019 12:26)  T(F): 98 (19 Jun 2019 12:26), Max: 98 (19 Jun 2019 12:26)  HR: 60 (19 Jun 2019 12:26) (60 - 86)  BP: 117/78 (19 Jun 2019 12:26) (117/78 - 120/70)  BP(mean): --  RR: 18 (19 Jun 2019 12:26) (16 - 18)  SpO2: 99% (19 Jun 2019 12:26) (97% - 100%)    PHYSICAL EXAM:  GENERAL: NAD, well-developed  HEAD:  Atraumatic, Normocephalic  EYES: EOMI, PERRLA, conjunctiva and sclera clear  NECK: Supple, No JVD  CHEST/LUNG: Clear to auscultation bilaterally; No wheeze  HEART: S1S2; No rubs, or gallops, no murmurs  ABDOMEN: Soft, Nontender, Nondistended; Bowel sounds present  EXTREMITIES:  + Peripheral Pulses, No clubbing or cyanosis, no edema  PSYCH: AO x 3,   NEUROLOGY: Alert, no focal motor or sensory deficits  SKIN: No rashes or lesions    LABS:                        14.2   16.59 )-----------( 256      ( 19 Jun 2019 11:07 )             40.6     06-19    135  |  98  |  29<H>  ----------------------------<  265<H>  3.7   |  24  |  1.02    Ca    9.4      19 Jun 2019 06:30  Phos  3.1     06-19  Mg     2.4     06-19      PT/INR - ( 18 Jun 2019 05:35 )   PT: 11.3 SEC;   INR: 0.99          PTT - ( 18 Jun 2019 05:35 )  PTT:31.2 SEC            All consultant(s) notes reviewed and care discussed with other providers        Contact Number, Dr Ramon [phone]
SUBJECTIVE: No cp or SOB      MEDICATIONS  (STANDING):  amLODIPine   Tablet 10 milliGRAM(s) Oral at bedtime  aspirin enteric coated 81 milliGRAM(s) Oral daily  atorvastatin 40 milliGRAM(s) Oral at bedtime  dextrose 5%. 1000 milliLiter(s) (50 mL/Hr) IV Continuous <Continuous>  dextrose 50% Injectable 12.5 Gram(s) IV Push once  dextrose 50% Injectable 25 Gram(s) IV Push once  dextrose 50% Injectable 25 Gram(s) IV Push once  heparin  Injectable 5000 Unit(s) SubCutaneous every 12 hours  hydrochlorothiazide 25 milliGRAM(s) Oral daily  insulin glargine Injectable (LANTUS) 25 Unit(s) SubCutaneous at bedtime  insulin lispro (HumaLOG) corrective regimen sliding scale   SubCutaneous three times a day before meals  insulin lispro (HumaLOG) corrective regimen sliding scale   SubCutaneous at bedtime  insulin lispro Injectable (HumaLOG) 7 Unit(s) SubCutaneous three times a day before meals  losartan 100 milliGRAM(s) Oral daily  metoprolol succinate  milliGRAM(s) Oral daily  sodium chloride 0.9% lock flush 3 milliLiter(s) IV Push every 8 hours  sodium chloride 0.9%. 500 milliLiter(s) (75 mL/Hr) IV Continuous <Continuous>  ticagrelor 90 milliGRAM(s) Oral two times a day    MEDICATIONS  (PRN):  dextrose 40% Gel 15 Gram(s) Oral once PRN Blood Glucose LESS THAN 70 milliGRAM(s)/deciliter  glucagon  Injectable 1 milliGRAM(s) IntraMuscular once PRN Glucose LESS THAN 70 milligrams/deciliter      LABS:                    14.2   16.59 )-----------( 256      ( 19 Jun 2019 11:07 )             40.6     135  |  98  |  29<H>  ----------------------------<  265<H>  3.7   |  24  |  1.02    Ca    9.4      19 Jun 2019 06:30  Phos  3.1     06-19  Mg     2.4     06-19    TPro  7.9  /  Alb  4.6  /  TBili  0.6  /  DBili  x   /  AST  17  /  ALT  28  /  AlkPhos  106  06-17    Serum Pro-Brain Natriuretic Peptide: 144.9 pg/mL (06-18 @ 05:35)    PHYSICAL EXAM:  Vital Signs Last 24 Hrs  T(C): 36.7 (19 Jun 2019 12:26), Max: 36.7 (18 Jun 2019 16:08)  T(F): 98 (19 Jun 2019 12:26), Max: 98 (18 Jun 2019 16:08)  HR: 60 (19 Jun 2019 12:26) (60 - 86)  BP: 117/78 (19 Jun 2019 12:26) (117/78 - 122/71)  RR: 18 (19 Jun 2019 12:26) (16 - 18)  SpO2: 99% (19 Jun 2019 12:26) (97% - 100%)    Cardiovascular:  S1S2 RRR, No JVD  Respiratory: Lungs clear to auscultation, normal effort  Gastrointestinal: Abdomen soft, ND, NT, +BS  Skin: Warm, dry, intact. No rash.  Musculoskeletal: Normal ROM, normal strength  Ext: No C/C/E B/L LE    DIAGNOSTIC DATA  TELEMETRY:  PACs      < from: Cardiac Cath Lab - Adult (06.18.19 @ 10:25) >  VENTRICLES: No LV gram was performed; however, a recent echocardiogram  demonstrated normal global and regional LV function.  CORONARY VESSELS: The coronary circulation is left dominant.  LM:   --  LM: Normal.  LAD:   --  Proximal LAD: There was a diffuse 99 % stenosis at the site of a  prior stent.  --  Mid LAD: Angiography showed minor luminal irregularities with no flow  limiting lesions.  --  Distal LAD: Angiography showed minor luminal irregularities with no  flow limiting lesions.  --  D1: Angiography showed mild atherosclerosis with no flow limiting  lesions.  --  D2: Angiography showed mild atherosclerosis with no flow limiting  lesions.  CX:   --  Circumflex: Angiography showed minor luminal irregularities with  no flow limiting lesions.  --  OM1: Angiography showed minor luminal irregularities with no flow  limiting lesions.  --  OM2: There was a discrete 60 % stenosis at the vessel ostium.  RCA:   --  RCA: The vessel was small sized.  --  Proximal RCA: Angiography showed minor luminal irregularities with no  flow limiting lesions.  --  Mid RCA: There was a tubular 60 % stenosis. In a second lesion, there  was a discrete 70 % stenosis.  --  Distal RCA: Angiography showed minor luminal irregularities with no  flow limiting lesions.  COMPLICATIONS: There were no complications.  DIAGNOSTIC RECOMMENDATIONS: Coronary angiogram demonstrates a severe  proximal LAD stenosis at the site of a prior stent that is the culprit of  the patient's clinical presentation. Will therefore perform PCI to the  proximal LAD. Will provide medicalmanagement of small vessel CAD  otherwise.  INTERVENTIONAL RECOMMENDATIONS: S/p successful DARRYL to the proximal LAD.  Post stent IVUS imaging demonstrated good stent apposition. The patient  should continue with dual antiplatelet therapy for at least 1 year.  Prepared and signed by  Heather Mandujano M.D.  Signed 06/19/2019 14:11:24    < end of copied text >      ASSESSMENT AND PLAN:      58y Male with history of CAD s/p DARRYL to pLAD in May of 2018, HTN, HLD, DM admitted with unstable angina and high risk NST showing anterior wall ischemia.     --s/p LHC with severe instent restenosis s/p DARRYL  --DAPT per interventional cardiology  --suspect hyperglycemia and leukocytosis due to Steroids pre cath  --blood sugars stabilizing  --appreciate endo eval  --WBC trending down on repeat  --DC planning--will d/w Dr Mandujano today  --f/u Dr Ashley in the office Fri June 28th at 915 AM as scheduled
Subjective: no chest pain or sob   	  MEDICATIONS:  MEDICATIONS  (STANDING):  amLODIPine   Tablet 10 milliGRAM(s) Oral at bedtime  aspirin enteric coated 81 milliGRAM(s) Oral daily  atorvastatin 40 milliGRAM(s) Oral at bedtime  dextrose 5%. 1000 milliLiter(s) (50 mL/Hr) IV Continuous <Continuous>  dextrose 50% Injectable 12.5 Gram(s) IV Push once  dextrose 50% Injectable 25 Gram(s) IV Push once  dextrose 50% Injectable 25 Gram(s) IV Push once  heparin  Injectable 5000 Unit(s) SubCutaneous every 12 hours  hydrochlorothiazide 25 milliGRAM(s) Oral daily  insulin glargine Injectable (LANTUS) 20 Unit(s) SubCutaneous at bedtime  insulin lispro (HumaLOG) corrective regimen sliding scale   SubCutaneous three times a day before meals  insulin lispro (HumaLOG) corrective regimen sliding scale   SubCutaneous at bedtime  insulin lispro Injectable (HumaLOG) 3 Unit(s) SubCutaneous three times a day before meals  losartan 100 milliGRAM(s) Oral daily  metoprolol succinate  milliGRAM(s) Oral daily  sodium chloride 0.9% lock flush 3 milliLiter(s) IV Push every 8 hours  sodium chloride 0.9%. 500 milliLiter(s) (75 mL/Hr) IV Continuous <Continuous>  ticagrelor 90 milliGRAM(s) Oral two times a day      LABS:	 	    CARDIAC MARKERS:                                14.3   10.33 )-----------( 252      ( 18 Jun 2019 05:35 )             41.1     06-18    132<L>  |  99  |  23  ----------------------------<  382<H>  4.3   |  20<L>  |  1.02    Ca    9.1      18 Jun 2019 05:35  Phos  2.8     06-18  Mg     2.1     06-18    TPro  7.9  /  Alb  4.6  /  TBili  0.6  /  DBili  x   /  AST  17  /  ALT  28  /  AlkPhos  106  06-17    proBNP: Serum Pro-Brain Natriuretic Peptide: 144.9 pg/mL (06-18 @ 05:35)    Lipid Profile:   HgA1c: Hemoglobin A1C, Whole Blood: 11.5 % (06-18 @ 05:35)    TSH: Thyroid Stimulating Hormone, Serum: 0.35 uIU/mL (06-18 @ 05:35)        PHYSICAL EXAM:  T(C): 36.6 (06-18-19 @ 06:41), Max: 36.7 (06-17-19 @ 15:19)  HR: 67 (06-18-19 @ 06:41) (63 - 76)  BP: 121/61 (06-18-19 @ 06:41) (111/85 - 143/88)  RR: 18 (06-18-19 @ 06:41) (16 - 18)  SpO2: 98% (06-18-19 @ 06:41) (98% - 100%)  Wt(kg): --  I&O's Summary        Appearance: Normal	  HEENT:   Normal oral mucosa, PERRL, EOMI	  Lymphatic: No lymphadenopathy , no edema  Cardiovascular: Normal S1 S2, No JVD, No murmurs , Peripheral pulses palpable 2+ bilaterally  Respiratory: Lungs clear to auscultation, normal effort 	  Gastrointestinal:  Soft, Non-tender, + BS	  Skin: No rashes, No ecchymoses, No cyanosis, warm to touch  Musculoskeletal: Normal range of motion, normal strength  Psychiatry:  Mood & affect appropriate    TELEMETRY: SR	    ECG: < from: 12 Lead ECG (06.17.19 @ 15:33) >  Diagnosis Line Normal sinus rhythm  Normal ECG    < end of copied text >  	  RADIOLOGY:   DIAGNOSTIC TESTING:  [ ] Echocardiogram: < from: Transthoracic Echocardiogram (05.08.18 @ 15:00) >  ------------------------------------------------------------------------  CONCLUSIONS:  1. Normal mitral valve. Minimal mitral regurgitation.  2. Normal trileaflet aortic valve.  3. Normal left ventricular internal dimensions and wall  thicknesses.  4. Endocardium not well visualized; grossly normal left  ventricular systolic function.  5. Normal right ventricular size and function.    < end of copied text >    [ ]  Catheterization: < from: Cardiac Cath Lab - Adult (05.08.18 @ 16:15) >  CORONARY VESSELS: The coronary circulation is left dominant.  LM:   --  LM: Normal.  LAD:   --  Proximal LAD: There was a 90 % stenosis.  --  MidLAD: Angiography showed mild atherosclerosis with no flow limiting  lesions. --  Distal LAD: Angiography showed minor luminal irregularities with  no flow limiting lesions. --  D1: Angiography showed minor luminal  irregularities with no flow limiting lesions.  --  D2: There was a discrete 20 % stenosis.  CX:   --  Circumflex: Angiography showed minor luminal irregularities with no  flow limiting lesions. --  OM1: Angiography showed minor luminal irregularities  with no flow limiting lesions. --OM2: Angiography showed minor luminal  irregularities with no flow limiting lesions.  --  OM3: There was a discrete 60 % stenosis at the vessel ostium.  RCA:   --  Proximal RCA: Normal.  --  Mid RCA: The vessel was small sized. There was a tubular 50% stenosis. In  a second lesion, there was a discrete 70 % stenosis. There was ALINA grade 3  flow through the vessel (brisk flow). --  Distal RCA: Angiography showed minor  luminal irregularities with no flow limiting lesions.  COMPLICATIONS: A hypersensitivity reaction occurred to contrast during the  procedure that was successfully treated with steroids and Benadryl.  DIAGNOSTIC RECOMMENDATIONS: Coronary angiogram demonstrates a severe stenosis  in the proximal LAD that is the culprit of the patient's NSTEMI. Will perform  intervention to the proximal LAD today. Will provide medical management of  small vessel CAD otherwise.  INTERVENTIONAL RECOMMENDATIONS: S/p successful DARRYL to the proximal LAD. The  patient should continue with dual antiplatelet therapy for at least 1 year.  Prepared and signed by  Heather Mandujano M.D.    < end of copied text >    [ ] Stress Test:    OTHER: 	      ASSESSMENT/PLAN: 	58y Male with history of CAD s/p DARRYL to pLAD in May of 2018, HTN, HLD, DM admitted with unstable angina and high risk NST showing anterior wall ischemia.     -Cath performed today showing severe in-stent restenosis in prox LAD stent  -Severe LAD stenosis successfully intervened on with DARRYL with post stent IVUS imaging showing good stent apposition  -Continue with DAPT with ASA and Brilinta  -pt. with small vessel CAD in RCA which I will medically manage for now  -monitor cr  -possible dc home tomorrow if medically stable     Heather Mandujano MD
Subjective: no chest pain or sob   	    amLODIPine   Tablet 10 milliGRAM(s) Oral at bedtime  aspirin enteric coated 81 milliGRAM(s) Oral daily  atorvastatin 40 milliGRAM(s) Oral at bedtime  dextrose 40% Gel 15 Gram(s) Oral once PRN  dextrose 5%. 1000 milliLiter(s) IV Continuous <Continuous>  dextrose 50% Injectable 12.5 Gram(s) IV Push once  dextrose 50% Injectable 25 Gram(s) IV Push once  dextrose 50% Injectable 25 Gram(s) IV Push once  glucagon  Injectable 1 milliGRAM(s) IntraMuscular once PRN  heparin  Injectable 5000 Unit(s) SubCutaneous every 12 hours  hydrochlorothiazide 25 milliGRAM(s) Oral daily  insulin glargine Injectable (LANTUS) 25 Unit(s) SubCutaneous at bedtime  insulin lispro (HumaLOG) corrective regimen sliding scale   SubCutaneous three times a day before meals  insulin lispro (HumaLOG) corrective regimen sliding scale   SubCutaneous at bedtime  insulin lispro Injectable (HumaLOG) 7 Unit(s) SubCutaneous three times a day before meals  losartan 100 milliGRAM(s) Oral daily  metoprolol succinate  milliGRAM(s) Oral daily  sodium chloride 0.9% lock flush 3 milliLiter(s) IV Push every 8 hours  sodium chloride 0.9%. 500 milliLiter(s) IV Continuous <Continuous>  ticagrelor 90 milliGRAM(s) Oral two times a day                            14.2   16.59 )-----------( 256      ( 19 Jun 2019 11:07 )             40.6       06-19    135  |  98  |  29<H>  ----------------------------<  265<H>  3.7   |  24  |  1.02    Ca    9.4      19 Jun 2019 06:30  Phos  3.1     06-19  Mg     2.4     06-19              T(C): 36.7 (06-19-19 @ 12:26), Max: 36.7 (06-19-19 @ 12:26)  HR: 60 (06-19-19 @ 12:26) (60 - 86)  BP: 117/78 (06-19-19 @ 12:26) (117/78 - 120/70)  RR: 18 (06-19-19 @ 12:26) (16 - 18)  SpO2: 99% (06-19-19 @ 12:26) (97% - 100%)  Wt(kg): --    I&O's Summary        Appearance: Normal	  HEENT:   Normal oral mucosa, PERRL, EOMI	  Lymphatic: No lymphadenopathy , no edema  Cardiovascular: Normal S1 S2, No JVD, No murmurs , Peripheral pulses palpable 2+ bilaterally  Respiratory: Lungs clear to auscultation, normal effort 	  Gastrointestinal:  Soft, Non-tender, + BS	  Skin: No rashes, No ecchymoses, No cyanosis, warm to touch  Right wrist: no hematoma; 2+ pulses      TELEMETRY: SR	    ECG: < from: 12 Lead ECG (06.17.19 @ 15:33) >  Diagnosis Line Normal sinus rhythm  Normal ECG    < end of copied text >  	  RADIOLOGY:   DIAGNOSTIC TESTING:  [ ] Echocardiogram: < from: Transthoracic Echocardiogram (05.08.18 @ 15:00) >  ------------------------------------------------------------------------  CONCLUSIONS:  1. Normal mitral valve. Minimal mitral regurgitation.  2. Normal trileaflet aortic valve.  3. Normal left ventricular internal dimensions and wall  thicknesses.  4. Endocardium not well visualized; grossly normal left  ventricular systolic function.  5. Normal right ventricular size and function.    < end of copied text >    [ ]  Catheterization: < from: Cardiac Cath Lab - Adult (05.08.18 @ 16:15) >  CORONARY VESSELS: The coronary circulation is left dominant.  LM:   --  LM: Normal.  LAD:   --  Proximal LAD: There was a 90 % stenosis.  --  MidLAD: Angiography showed mild atherosclerosis with no flow limiting  lesions. --  Distal LAD: Angiography showed minor luminal irregularities with  no flow limiting lesions. --  D1: Angiography showed minor luminal  irregularities with no flow limiting lesions.  --  D2: There was a discrete 20 % stenosis.  CX:   --  Circumflex: Angiography showed minor luminal irregularities with no  flow limiting lesions. --  OM1: Angiography showed minor luminal irregularities  with no flow limiting lesions. --OM2: Angiography showed minor luminal  irregularities with no flow limiting lesions.  --  OM3: There was a discrete 60 % stenosis at the vessel ostium.  RCA:   --  Proximal RCA: Normal.  --  Mid RCA: The vessel was small sized. There was a tubular 50% stenosis. In  a second lesion, there was a discrete 70 % stenosis. There was ALINA grade 3  flow through the vessel (brisk flow). --  Distal RCA: Angiography showed minor  luminal irregularities with no flow limiting lesions.  COMPLICATIONS: A hypersensitivity reaction occurred to contrast during the  procedure that was successfully treated with steroids and Benadryl.  DIAGNOSTIC RECOMMENDATIONS: Coronary angiogram demonstrates a severe stenosis  in the proximal LAD that is the culprit of the patient's NSTEMI. Will perform  intervention to the proximal LAD today. Will provide medical management of  small vessel CAD otherwise.  INTERVENTIONAL RECOMMENDATIONS: S/p successful DARRYL to the proximal LAD. The  patient should continue with dual antiplatelet therapy for at least 1 year.  Prepared and signed by  Heather Mandujano M.D.    < end of copied text >    [ ] Stress Test:    OTHER: 	      ASSESSMENT/PLAN: 	58y Male with history of CAD s/p DARRYL to pLAD in May of 2018, HTN, HLD, DM admitted with unstable angina and high risk NST showing anterior wall ischemia.     -Cath performed showing severe in-stent restenosis in prox LAD stent  -Severe LAD stenosis successfully intervened on with DARRYL with post stent IVUS imaging showing good stent apposition  -Continue with DAPT with ASA and Brilinta  -pt. with small vessel CAD in RCA which I will medically manage for now  -no further interventional workup needed at this time  -dc home with outpatient follow with Dr. Ashley on 06/28/19 at 9:15 am at [phone]    Heather Mandujano MD

## 2019-06-19 NOTE — CONSULT NOTE ADULT - SUBJECTIVE AND OBJECTIVE BOX
Patient is a 58y old  Male who presents with a chief complaint of abnormal stress test (18 Jun 2019 15:14)      HPI:  58 Yro M w/ pmhx of HLD, HTN, DM, CAD x1 DARRYL 05/2018 was sent to by cardiologist for abnormal NST showing anterior wall ischemia and for premedication for contrast allergy prior to Cath in am. Pt denies chest pain, SOB, CASE, palpitations, diaphoresis, lightheadedness, dizziness, syncope, fever chills, malaise, myalgias, anorexia, abd pain, N/V/C/D,  cough, and wheezing. (17 Jun 2019 22:12)      PAST MEDICAL & SURGICAL HISTORY:  DKA (diabetic ketoacidoses)  HLD (hyperlipidemia)  HTN (hypertension)  Diabetes  History of appendectomy      Review of Systems:   CONSTITUTIONAL: No fever, weight loss, or fatigue  EYES: No eye pain, visual disturbances, or discharge  ENMT:  No difficulty hearing, tinnitus, vertigo; No sinus or throat pain  NECK: No pain or stiffness  RESPIRATORY: No cough, wheezing, chills or hemoptysis; No shortness of breath  CARDIOVASCULAR: see above HPI   GASTROINTESTINAL: No abdominal or epigastric pain. No nausea, vomiting, or hematemesis; No diarrhea or constipation  GENITOURINARY: No dysuria, frequency, hematuria, or incontinence  NEUROLOGICAL: No headaches, memory loss, loss of strength, numbness, or tremors  SKIN: No itching, burning, rashes, or lesions   LYMPH NODES: No enlarged glands  ENDOCRINE: No heat or cold intolerance; No hair loss  MUSCULOSKELETAL: No joint pain or swelling; No muscle, back, or extremity pain  PSYCHIATRIC: No depression, anxiety, mood swings, or difficulty sleeping  HEME/LYMPH: No easy bruising, or bleeding gums  ALLERGY AND IMMUNOLOGIC: No hives or eczema    Allergies    iodinated radiocontrast agents (Hives; Swelling)    Intolerances        Social History: non smoker  no IVDA  no ETOH abuse       FAMILY HISTORY:  Family history of high blood pressure  Family history of early CAD      MEDICATIONS  (STANDING):  amLODIPine   Tablet 10 milliGRAM(s) Oral at bedtime  aspirin enteric coated 81 milliGRAM(s) Oral daily  atorvastatin 40 milliGRAM(s) Oral at bedtime  dextrose 5%. 1000 milliLiter(s) (50 mL/Hr) IV Continuous <Continuous>  dextrose 50% Injectable 12.5 Gram(s) IV Push once  dextrose 50% Injectable 25 Gram(s) IV Push once  dextrose 50% Injectable 25 Gram(s) IV Push once  heparin  Injectable 5000 Unit(s) SubCutaneous every 12 hours  hydrochlorothiazide 25 milliGRAM(s) Oral daily  insulin glargine Injectable (LANTUS) 20 Unit(s) SubCutaneous at bedtime  insulin lispro (HumaLOG) corrective regimen sliding scale   SubCutaneous three times a day before meals  insulin lispro (HumaLOG) corrective regimen sliding scale   SubCutaneous at bedtime  insulin lispro Injectable (HumaLOG) 3 Unit(s) SubCutaneous three times a day before meals  losartan 100 milliGRAM(s) Oral daily  metoprolol succinate  milliGRAM(s) Oral daily  sodium chloride 0.9% lock flush 3 milliLiter(s) IV Push every 8 hours  sodium chloride 0.9%. 500 milliLiter(s) (75 mL/Hr) IV Continuous <Continuous>  ticagrelor 90 milliGRAM(s) Oral two times a day    MEDICATIONS  (PRN):  dextrose 40% Gel 15 Gram(s) Oral once PRN Blood Glucose LESS THAN 70 milliGRAM(s)/deciliter  glucagon  Injectable 1 milliGRAM(s) IntraMuscular once PRN Glucose LESS THAN 70 milligrams/deciliter      CAPILLARY BLOOD GLUCOSE      POCT Blood Glucose.: 304 mg/dL (18 Jun 2019 16:54)  POCT Blood Glucose.: 309 mg/dL (18 Jun 2019 12:57)  POCT Blood Glucose.: 343 mg/dL (18 Jun 2019 06:59)  POCT Blood Glucose.: 322 mg/dL (17 Jun 2019 23:38)  POCT Blood Glucose.: 199 mg/dL (17 Jun 2019 21:11)  POCT Blood Glucose.: 107 mg/dL (17 Jun 2019 18:24)    I&O's Summary      24hrs Vital:  T(C): 36.7 (06-18-19 @ 16:08), Max: 36.7 (06-18-19 @ 16:08)  HR: 70 (06-18-19 @ 16:08) (63 - 73)  BP: 122/71 (06-18-19 @ 16:08) (111/85 - 136/79)  RR: 17 (06-18-19 @ 16:08) (16 - 18)  SpO2: 100% (06-18-19 @ 16:08) (98% - 100%)    PHYSICAL EXAM:  GENERAL: NAD, well-developed  HEAD:  Atraumatic, Normocephalic  EYES: EOMI, PERRLA, conjunctiva and sclera clear  NECK: Supple, No JVD  CHEST/LUNG: Clear to auscultation bilaterally; No wheeze  HEART: S1S2; No rubs, or gallops, no murmurs  ABDOMEN: Soft, Nontender, Nondistended; Bowel sounds present  EXTREMITIES:  + Peripheral Pulses, No clubbing or cyanosis, no edema  PSYCH: AO x 3,   NEUROLOGY: Alert, no focal motor or sensory deficits  SKIN: No rashes or lesions    LABS:                        14.3   10.33 )-----------( 252      ( 18 Jun 2019 05:35 )             41.1     06-18    132<L>  |  99  |  23  ----------------------------<  382<H>  4.3   |  20<L>  |  1.02    Ca    9.1      18 Jun 2019 05:35  Phos  2.8     06-18  Mg     2.1     06-18    TPro  7.9  /  Alb  4.6  /  TBili  0.6  /  DBili  x   /  AST  17  /  ALT  28  /  AlkPhos  106  06-17    PT/INR - ( 18 Jun 2019 05:35 )   PT: 11.3 SEC;   INR: 0.99          PTT - ( 18 Jun 2019 05:35 )  PTT:31.2 SEC          RADIOLOGY & ADDITIONAL TESTS:    Consultant(s) Notes Reviewed:      Care Discussed with Consultants/Other Providers:
Requesting Physician : Dr. Alexis     Reason for Consultation: CAD    HISTORY OF PRESENT ILLNESS: 58 year old male with a history of hypertension, diabetes, hypercholesterolemia, history of CAD s/p nonST elevation MI in May 2018 with DARRYL to LAD who is being seen for management of CAD.  The patient was admitted with intermittent CASE for several days.  He saw his outpatient cardiologist Dr. Alexis for these complaints at which time NST was performed showing anterior wall ischemia.  He was subsequently sent in for further workup.  Pt. was chest pain free upon evaluation.  In May of 2018 the patient had similar complaints at which time cardiac cath was performed showing 90% LAD lesion which was successfully intervened on with DARRYL.        PAST MEDICAL & SURGICAL HISTORY:  DKA (diabetic ketoacidoses)  HLD (hyperlipidemia)  HTN (hypertension)  Diabetes  No significant past surgical history          MEDICATIONS:  MEDICATIONS  (STANDING):      Allergies    iodinated radiocontrast agents (Hives; Swelling)    Intolerances        FAMILY HISTORY:  Family history of high blood pressure (Father, Mother)  Family history of early CAD (Father)    Non-contributary for premature coronary disease or sudden cardiac death    SOCIAL HISTORY:    [x ] Non-smoker  [ ] Smoker  [ ] Alcohol      REVIEW OF SYSTEMS:  [x ]chest pain  [  ]shortness of breath  [  ]palpitations  [  ]syncope  [ ]near syncope [ ]upper extremity weakness   [ ] lower extremity weakness  [  ]diplopia  [  ]altered mental status   [  ]fevers  [ ]chills [ ]nausea  [ ]vomitting  [  ]dysphagia    [ ]abdominal pain  [ ]melena  [ ]BRBPR    [  ]epistaxis  [  ]rash    [ ]lower extremity edema        [x ] All others negative	  [ ] Unable to obtain    PHYSICAL EXAM:  T(C): 36.7 (06-17-19 @ 15:19), Max: 36.7 (06-17-19 @ 15:19)  HR: 76 (06-17-19 @ 16:09) (76 - 76)  BP: 133/60 (06-17-19 @ 16:09) (133/60 - 143/88)  RR: 18 (06-17-19 @ 16:09) (16 - 18)  SpO2: 100% (06-17-19 @ 16:09) (99% - 100%)  Wt(kg): --  I&O's Summary  	  Lymphatic: No lymphadenopathy , no edema  Cardiovascular: Normal S1 S2, No JVD, No murmurs , Peripheral pulses palpable 2+ bilaterally  Respiratory: Lungs clear to auscultation, normal effort 	  Gastrointestinal:  Soft, Non-tender, + BS	  Musculoskeletal: Normal range of motion, normal strength  Psychiatry:  Mood & affect appropriate      TELEMETRY: 	    ECG: pending  	  RADIOLOGY:  OTHER:     DIAGNOSTIC TESTING:  [ ] Echocardiogram: < from: Transthoracic Echocardiogram (05.08.18 @ 15:00) >  CONCLUSIONS:  1. Normal mitral valve. Minimal mitral regurgitation.  2. Normal trileaflet aortic valve.  3. Normal left ventricular internal dimensions and wall  thicknesses.  4. Endocardium not well visualized; grossly normal left  ventricular systolic function.  5. Normal right ventricular size and function.    < end of copied text >    [ ]  Catheterization: < from: Cardiac Cath Lab - Adult (05.08.18 @ 16:15) >  CORONARY VESSELS: The coronary circulation is left dominant.  LM:   --  LM: Normal.  LAD:   --  Proximal LAD: There was a 90 % stenosis.  --  MidLAD: Angiography showed mild atherosclerosis with no flow limiting  lesions. --  Distal LAD: Angiography showed minor luminal irregularities with  no flow limiting lesions. --  D1: Angiography showed minor luminal  irregularities with no flow limiting lesions.  --  D2: There was a discrete 20 % stenosis.  CX:   --  Circumflex: Angiography showed minor luminal irregularities with no  flow limiting lesions. --  OM1: Angiography showed minor luminal irregularities  with no flow limiting lesions. --OM2: Angiography showed minor luminal  irregularities with no flow limiting lesions.  --  OM3: There was a discrete 60 % stenosis at the vessel ostium.  RCA:   --  Proximal RCA: Normal.  --  Mid RCA: The vessel was small sized. There was a tubular 50% stenosis. In  a second lesion, there was a discrete 70 % stenosis. There was ALINA grade 3  flow through the vessel (brisk flow). --  Distal RCA: Angiography showed minor  luminal irregularities with no flow limiting lesions.  COMPLICATIONS: A hypersensitivity reaction occurred to contrast during the  procedure that was successfully treated with steroids and Benadryl.  DIAGNOSTIC RECOMMENDATIONS: Coronary angiogram demonstrates a severe stenosis  in the proximal LAD that is the culprit of the patient's NSTEMI. Will perform  intervention to the proximal LAD today. Will provide medical management of  small vessel CAD otherwise.  INTERVENTIONAL RECOMMENDATIONS: S/p successful DARRYL to the proximal LAD. The  patient should continue with dual antiplatelet therapy for at least 1 year.  Prepared and signed by  Heather Mandujano M.D.    < end of copied text >    [ ] Stress Test:    	  	  LABS:	 	    CARDIAC MARKERS:                proBNP:   Lipid Profile:   HgA1c:   TSH:     ASSESSMENT/PLAN: 	58yMale with history of HTN, HLD, DM, CAD s/p NSTEMI in May of 2018 s/p DARRYL to LAD who is being seen for management of CAD.   -Given CASE and abnormal NST showing anterior wall ischemia, recommend cardiac cath  -Pt. with contrast allergy  -therefore would give steroid prep prior to cardiac cath  -would give  prednisone 50mg PO X 1 - 13 hours prior to procedure                     prednisone 50mg PO X 1 - 7 hours prior to procedure                     predinsone 50mg PO X 1 - 1 hour prior to procedure  -will plan on cardiac at tomorrow at 9am   -check labs  continue with dapt   -further workup pending above    Heather Mandujano MD
EP ATTENDING      HISTORY OF PRESENT ILLNESS: He is a pleasant 59 y/o male PMH CAD s/p PCI of the prox LAD in May 2018. He is now readmitted with chest pain and anterior wall ischemia on NST. EP is now called for periods of bradycardia. The rhythm was sinus, the QRS is narrow, and his TSH is normal. In addition he denies presyncope, syncope, nor exercise tolerance A repeat cath is pending for today.    PAST MEDICAL & SURGICAL HISTORY:  HLD (hyperlipidemia)  HTN (hypertension)  Diabetes  CAD s/p PCI to the LAD  History of appendectomy    MEDICATIONS  (STANDING):  amLODIPine   Tablet 10 milliGRAM(s) Oral at bedtime  aspirin enteric coated 81 milliGRAM(s) Oral daily  atorvastatin 40 milliGRAM(s) Oral at bedtime  dextrose 5%. 1000 milliLiter(s) (50 mL/Hr) IV Continuous <Continuous>  dextrose 50% Injectable 12.5 Gram(s) IV Push once  dextrose 50% Injectable 25 Gram(s) IV Push once  dextrose 50% Injectable 25 Gram(s) IV Push once  heparin  Injectable 5000 Unit(s) SubCutaneous every 12 hours  hydrochlorothiazide 25 milliGRAM(s) Oral daily  insulin glargine Injectable (LANTUS) 20 Unit(s) SubCutaneous at bedtime  insulin lispro (HumaLOG) corrective regimen sliding scale   SubCutaneous three times a day before meals  insulin lispro (HumaLOG) corrective regimen sliding scale   SubCutaneous at bedtime  insulin lispro Injectable (HumaLOG) 3 Unit(s) SubCutaneous three times a day before meals  losartan 100 milliGRAM(s) Oral daily  metoprolol succinate  milliGRAM(s) Oral daily  predniSONE   Tablet 50 milliGRAM(s) Oral <User Schedule>  sodium chloride 0.9% lock flush 3 milliLiter(s) IV Push every 8 hours  ticagrelor 90 milliGRAM(s) Oral two times a day      Allergies    iodinated radiocontrast agents (Hives; Swelling)    Intolerances        FAMILY HISTORY:  Family history of high blood pressure  Family history of early CAD    Non-contributary for premature coronary disease or sudden cardiac death    SOCIAL HISTORY:    [x ] Non-smoker  [ ] Smoker  [ ] Alcohol      REVIEW OF SYSTEMS:  [ x]chest pain  [  ]shortness of breath  [  ]palpitations  [  ]syncope  [ ]near syncope [ ]upper extremity weakness   [ ] lower extremity weakness  [  ]diplopia  [  ]altered mental status   [  ]fevers  [ ]chills [ ]nausea  [ ]vomitting  [  ]dysphagia    [ ]abdominal pain  [ ]melena  [ ]BRBPR    [  ]epistaxis  [  ]rash    [ ]lower extremity edema        [x ] All others negative	  [ ] Unable to obtain    PHYSICAL EXAM:  T(C): 36.6 (06-18-19 @ 06:41), Max: 36.7 (06-17-19 @ 15:19)  HR: 67 (06-18-19 @ 06:41) (63 - 76)  BP: 121/61 (06-18-19 @ 06:41) (111/85 - 143/88)  RR: 18 (06-18-19 @ 06:41) (16 - 18)  SpO2: 98% (06-18-19 @ 06:41) (98% - 100%)  Wt(kg): --    no JVD  RRR, no murmurs  CTAB  soft nt/nd  no c/c/e    TELEMETRY: 	    ECG:  	    Echo:  NST:  Cath:  	  	  LABS:	 	                          14.3   10.33 )-----------( 252      ( 18 Jun 2019 05:35 )             41.1     06-18    132<L>  |  99  |  23  ----------------------------<  382<H>  4.3   |  20<L>  |  1.02    Ca    9.1      18 Jun 2019 05:35  Phos  2.8     06-18  Mg     2.1     06-18    TPro  7.9  /  Alb  4.6  /  TBili  0.6  /  DBili  x   /  AST  17  /  ALT  28  /  AlkPhos  106  06-17    proBNP: Serum Pro-Brain Natriuretic Peptide: 144.9 pg/mL (06-18 @ 05:35)    Lipid Profile:   HgA1c: Hemoglobin A1C, Whole Blood: 11.5 % (06-18 @ 05:35)    TSH: Thyroid Stimulating Hormone, Serum: 0.35 uIU/mL (06-18 @ 05:35)      A/P) He is a pleasant 59 y/o male PMH CAD s/p PCI of the prox LAD in May 2018. He is now readmitted with chest pain and anterior wall ischemia on NST. EP is now called for periods of bradycardia. The rhythm was sinus, the QRS is narrow, and his TSH is normal. In addition he denies presyncope, syncope, nor exercise tolerance A repeat cath is pending for today.    -f/u repeat cath  -no indication for PPM at this time given that he is asymptomatic, has a narrow QRS, and demonstrates good chronotropic competence during exertion    Kat Arevalo M.D., [facility]  Cardiac Electrophysiology  [facility]  [address] [place] [address], [place] [address]  [email or web address]    office [phone]  pager [phone]
HPI:  58 Yro M w/ pmhx of HLD, HTN, DM, CAD x1 DARRYL 05/2018 was sent to by cardiologist for abnormal NST showing anterior wall ischemia and for premedication for contrast allergy prior to Cath in am. Pt denies chest pain, SOB, CASE, palpitations, diaphoresis, lightheadedness, dizziness, syncope, fever chills, malaise, myalgias, anorexia, abd pain, N/V/C/D,  cough, and wheezing. (17 Jun 2019 22:12)  Patient has history of diabetes, on mixed insulin at home, no recent hypoglycemic episodes, no polyuria polydipsia. Patient follows up with PCP for diabetes management.    PAST MEDICAL & SURGICAL HISTORY:  DKA (diabetic ketoacidoses)  HLD (hyperlipidemia)  HTN (hypertension)  Diabetes  History of appendectomy      FAMILY HISTORY:  Family history of high blood pressure  Family history of early CAD      Social History:    Outpatient Medications:    MEDICATIONS  (STANDING):  amLODIPine   Tablet 10 milliGRAM(s) Oral at bedtime  aspirin enteric coated 81 milliGRAM(s) Oral daily  atorvastatin 40 milliGRAM(s) Oral at bedtime  dextrose 5%. 1000 milliLiter(s) (50 mL/Hr) IV Continuous <Continuous>  dextrose 50% Injectable 12.5 Gram(s) IV Push once  dextrose 50% Injectable 25 Gram(s) IV Push once  dextrose 50% Injectable 25 Gram(s) IV Push once  heparin  Injectable 5000 Unit(s) SubCutaneous every 12 hours  hydrochlorothiazide 25 milliGRAM(s) Oral daily  insulin glargine Injectable (LANTUS) 25 Unit(s) SubCutaneous at bedtime  insulin lispro (HumaLOG) corrective regimen sliding scale   SubCutaneous three times a day before meals  insulin lispro (HumaLOG) corrective regimen sliding scale   SubCutaneous at bedtime  insulin lispro Injectable (HumaLOG) 7 Unit(s) SubCutaneous three times a day before meals  losartan 100 milliGRAM(s) Oral daily  metoprolol succinate  milliGRAM(s) Oral daily  sodium chloride 0.9% lock flush 3 milliLiter(s) IV Push every 8 hours  sodium chloride 0.9%. 500 milliLiter(s) (75 mL/Hr) IV Continuous <Continuous>  ticagrelor 90 milliGRAM(s) Oral two times a day    MEDICATIONS  (PRN):  dextrose 40% Gel 15 Gram(s) Oral once PRN Blood Glucose LESS THAN 70 milliGRAM(s)/deciliter  glucagon  Injectable 1 milliGRAM(s) IntraMuscular once PRN Glucose LESS THAN 70 milligrams/deciliter      Allergies    iodinated radiocontrast agents (Hives; Swelling)    Intolerances      Review of Systems:  Constitutional: No fever, no chills  Eyes: No blurry vision  Neuro: No tremors  HEENT: No pain, no neck swelling  Cardiovascular: No chest pain, no palpitations  Respiratory: Has SOB, no cough  GI: No nausea, vomiting, abdominal pain  : No dysuria  Skin: no rash  MSK: Has leg swelling.  Psych: no depression  Endocrine: no polyuria, polydipsia    ALL OTHER SYSTEMS REVIEWED AND NEGATIVE    UNABLE TO OBTAIN    PHYSICAL EXAM:  VITALS: T(C): 36.7 (06-19-19 @ 12:26)  T(F): 98 (06-19-19 @ 12:26), Max: 98 (06-18-19 @ 16:08)  HR: 60 (06-19-19 @ 12:26) (60 - 86)  BP: 117/78 (06-19-19 @ 12:26) (117/78 - 122/71)  RR:  (16 - 18)  SpO2:  (97% - 100%)  Wt(kg): --  GENERAL: NAD, well-groomed, well-developed  EYES: No proptosis, no lid lag  HEENT:  Atraumatic, Normocephalic  THYROID: Normal size, no palpable nodules  RESPIRATORY: Clear to auscultation bilaterally; No rales, rhonchi, wheezing  CARDIOVASCULAR: Si S2, No murmurs;  GI: Soft, non distended, normal bowel sounds  SKIN: Dry, intact, No rashes or lesions  MUSCULOSKELETAL: Has BL lower extremity edema.  NEURO:  no tremor, sensation decreased in feet BL,    POCT Blood Glucose.: 220 mg/dL (06-19-19 @ 11:49)  POCT Blood Glucose.: 248 mg/dL (06-19-19 @ 07:35)  POCT Blood Glucose.: 307 mg/dL (06-19-19 @ 01:37)  POCT Blood Glucose.: 333 mg/dL (06-19-19 @ 00:34)  POCT Blood Glucose.: 428 mg/dL (06-18-19 @ 22:34)  POCT Blood Glucose.: 439 mg/dL (06-18-19 @ 21:08)  POCT Blood Glucose.: 455 mg/dL (06-18-19 @ 21:06)  POCT Blood Glucose.: 304 mg/dL (06-18-19 @ 16:54)  POCT Blood Glucose.: 309 mg/dL (06-18-19 @ 12:57)  POCT Blood Glucose.: 343 mg/dL (06-18-19 @ 06:59)  POCT Blood Glucose.: 322 mg/dL (06-17-19 @ 23:38)  POCT Blood Glucose.: 199 mg/dL (06-17-19 @ 21:11)  POCT Blood Glucose.: 107 mg/dL (06-17-19 @ 18:24)                            14.2   16.59 )-----------( 256      ( 19 Jun 2019 11:07 )             40.6       06-19    135  |  98  |  29<H>  ----------------------------<  265<H>  3.7   |  24  |  1.02    EGFR if : 95  EGFR if non : 82    Ca    9.4      06-19  Mg     2.4     06-19  Phos  3.1     06-19    TPro  7.9  /  Alb  4.6  /  TBili  0.6  /  DBili  x   /  AST  17  /  ALT  28  /  AlkPhos  106  06-17      Thyroid Function Tests:  06-18 @ 05:35 TSH 0.35 FreeT4 -- T3 -- Anti TPO -- Anti Thyroglobulin Ab -- TSI --      Hemoglobin A1C, Whole Blood: 11.5 % <H> [4.0 - 5.6] (06-18-19 @ 05:35)      06-18 Chol 200<H>  HDL 43 Trig 62    Radiology:

## 2019-06-19 NOTE — PROGRESS NOTE ADULT - REASON FOR ADMISSION
abnormal stress test

## 2019-06-19 NOTE — PROVIDER CONTACT NOTE (OTHER) - ASSESSMENT
pt A&Ox4 sleeping comfortably in bed with  at this time. FS previously 89 pt without symptoms of hyperglycemia, 4 units of Humalog sliding scale and 20 units Lantus given as ordered at 21:20 (see emar). Tele PA aware.

## 2019-06-19 NOTE — DISCHARGE NOTE NURSING/CASE MANAGEMENT/SOCIAL WORK - NSDCDPATPORTLINK_GEN_ALL_CORE
You can access the [facility] Patient Portal, offered by [facility], by registering with the following website: [email or web address]

## 2020-01-05 NOTE — ED PROVIDER NOTE - NS ED MD DISPO DIVISION
----- Message from Naomie Simpson MD sent at 1/5/2020 10:56 AM CST -----  Contact patient's parents. The urine culture did not grow any bacteria. If patient has improved they can stop the antibiotic after 3 days of treatment.
Writer called and left detailed VM, ok'd per last OV note, relaying results message below. Encounter closed.
AMILCAR

## 2020-07-29 NOTE — ED PROVIDER NOTE - CPE EDP [facility] NORM
Scanned signed form to the disability department completed folder.      Electronically signed by: Marycruz Churchill  7/29/2020    normal...

## 2021-03-08 NOTE — H&P ADULT - PROBLEM/PLAN-4
History  Chief Complaint   Patient presents with    Ear Swelling     ear piercing swelling started today  piercing still in place  32 yo F wtih PMH of depression/anxiety presents to ED with ear pain  She has a stud earring on tragus of L ear, which was placed January States it became red and inflamed and painful today  No fevers or chills  Has had some drainage  She tried to push the earring "through" because it was swelling, but that didn't work so she came in  Denies pregnancy  History provided by:  Patient and medical records   used: No    Earache  Location:  Left  Behind ear:  Redness and swelling  Quality:  Aching  Severity:  Moderate  Associated symptoms: no abdominal pain, no congestion, no cough, no diarrhea, no fever, no headaches, no neck pain, no rash, no rhinorrhea, no sore throat and no vomiting        Prior to Admission Medications   Prescriptions Last Dose Informant Patient Reported? Taking? FLUoxetine (PROzac) 10 mg capsule   Yes Yes   Sig: Take 10 mg by mouth daily 1 po qd in addition to 20 mg pill   FLUoxetine (PROzac) 20 mg capsule   Yes Yes   Sig: Take 20 mg by mouth daily   etonogestrel-ethinyl estradiol (NUVARING) 0 12-0 015 MG/24HR vaginal ring   Yes No   Sig: Insert 1 each into the vagina every 21 days      Facility-Administered Medications: None       Past Medical History:   Diagnosis Date    Anxiety        History reviewed  No pertinent surgical history  History reviewed  No pertinent family history  I have reviewed and agree with the history as documented      E-Cigarette/Vaping    E-Cigarette Use Never User      E-Cigarette/Vaping Substances    Nicotine No     THC No     CBD No     Flavoring No     Other No     Unknown No      Social History     Tobacco Use    Smoking status: Never Smoker    Smokeless tobacco: Never Used   Substance Use Topics    Alcohol use: Not Currently    Drug use: Not Currently       Review of Systems Constitutional: Negative for appetite change, chills, fatigue and fever  HENT: Positive for ear pain  Negative for congestion, rhinorrhea, sore throat, trouble swallowing and voice change  Eyes: Negative for pain and visual disturbance  Respiratory: Negative for cough, chest tightness and shortness of breath  Cardiovascular: Negative for chest pain, palpitations and leg swelling  Gastrointestinal: Negative for abdominal pain, blood in stool, constipation, diarrhea, nausea and vomiting  Genitourinary: Negative for difficulty urinating and hematuria  Musculoskeletal: Negative for back pain, neck pain and neck stiffness  Skin: Negative for rash  Neurological: Negative for dizziness, syncope, speech difficulty, light-headedness and headaches  Psychiatric/Behavioral: Negative for confusion and suicidal ideas  Physical Exam  Physical Exam  Vitals signs and nursing note reviewed  Constitutional:       General: She is not in acute distress  Appearance: She is well-developed  She is not diaphoretic  HENT:      Head: Normocephalic and atraumatic  Right Ear: External ear normal       Left Ear: External ear normal       Ears:        Nose: Nose normal    Eyes:      General: No scleral icterus  Right eye: No discharge  Left eye: No discharge  Conjunctiva/sclera: Conjunctivae normal       Pupils: Pupils are equal, round, and reactive to light  Neck:      Musculoskeletal: Normal range of motion and neck supple  Trachea: No tracheal deviation  Cardiovascular:      Rate and Rhythm: Normal rate and regular rhythm  Heart sounds: Normal heart sounds  No murmur  No friction rub  No gallop  Pulmonary:      Effort: Pulmonary effort is normal  No respiratory distress  Breath sounds: Normal breath sounds  No stridor  Chest:      Chest wall: No tenderness  Abdominal:      General: Bowel sounds are normal       Palpations: Abdomen is soft        Tenderness: There is no abdominal tenderness  There is no guarding or rebound  Musculoskeletal: Normal range of motion  General: No deformity  Lymphadenopathy:      Cervical: No cervical adenopathy  Skin:     General: Skin is warm and dry  Findings: No rash  Neurological:      Mental Status: She is alert and oriented to person, place, and time  Cranial Nerves: No cranial nerve deficit  Sensory: No sensory deficit        Coordination: Coordination normal    Psychiatric:         Behavior: Behavior normal          Vital Signs  ED Triage Vitals [03/08/21 0233]   Temperature Pulse Respirations Blood Pressure SpO2   98 4 °F (36 9 °C) 89 20 109/71 99 %      Temp Source Heart Rate Source Patient Position - Orthostatic VS BP Location FiO2 (%)   Temporal Monitor Sitting Left arm --      Pain Score       2           Vitals:    03/08/21 0233   BP: 109/71   Pulse: 89   Patient Position - Orthostatic VS: Sitting         Visual Acuity      ED Medications  Medications   lidocaine (LMX) 4 % cream ( Topical Given 3/8/21 0252)   lidocaine (PF) (XYLOCAINE-MPF) 1 % injection 1 mL (1 mL Infiltration Given 3/8/21 0252)   LORazepam (ATIVAN) tablet 0 5 mg (0 5 mg Oral Given 3/8/21 0318)   neomycin-bacitracin-polymyxin b (NEOSPORIN) ointment 1 small application (1 small application Topical Given 3/8/21 0403)   cephalexin (KEFLEX) capsule 500 mg (500 mg Oral Given 3/8/21 0402)       Diagnostic Studies  Results Reviewed     Procedure Component Value Units Date/Time    POCT pregnancy, urine [[id number]]     Lab Status: No result                  No orders to display              Procedures  Foreign Body - Embedded    Date/Time: 3/8/2021 4:06 AM  Performed by: Emilie Mercedes MD  Authorized by: Emilie Mercedes MD     Patient location:  ED  Other Assisting Provider: No    Consent:     Consent obtained:  Verbal    Consent given by:  Patient    Risks discussed:  Bleeding, infection, nerve damage, incomplete removal, poor cosmetic result, pain and worsening of condition    Alternatives discussed:  No treatment  Universal protocol:     Procedure explained and questions answered to patient or proxy's satisfaction: yes      Relevant documents present and verified: yes      Test results available and properly labeled: yes      Radiology Images displayed and confirmed  If images not available, report reviewed : yes      Required blood products, implants, devices, and special equipment available: yes      Site/side marked: yes      Immediately prior to procedure, a time out was called: yes      Patient identity confirmed:  Verbally with patient  Location:     Location:  Ear    Ear location:  L ear    Depth:  Subcutaneous    Tendon involvement:  None  Pre-procedure details:     Imaging:  None    Neurovascular status: intact      Preparation: Patient was prepped and draped in usual sterile fashion    Anesthesia (see MAR for exact dosages): Anesthesia method:  Local infiltration and topical application    Topical anesthetic:  Lidocaine gel    Local anesthetic:  Lidocaine 1% w/o epi  Procedure details:     Scalpel size:  11    Incision length:  0 1    Localization method:  Probed and visualized    Dissection of underlying tissues: no      Bloodless field: no      Removal mechanism: Forceps    Removal Method:  Percutaneous    Procedure complexity:  Simple    Foreign bodies recovered:  1    Description:  Earring    Intact foreign body removal: yes    Post-procedure details:     Neurovascular status: intact      Confirmation:  No additional foreign bodies on visualization    Skin closure:  None    Dressing:  Antibiotic ointment and non-adherent dressing    Patient tolerance of procedure: Tolerated well, no immediate complications             ED Course  ED Course as of Mar 08 0409   Mon Mar 08, 2021   0407 Earring removed successfully  No fluctuance or purulent drainage to necessitate I&D   Will treat with keflex for surrounding erythema, suspect cellulitis  Discussed wound care and f/u for reeval this week  RTED if sx worsen  Mom and pt agree with plan  MDM  Number of Diagnoses or Management Options  Cellulitis: new and does not require workup  Foreign body (FB) in soft tissue: new and does not require workup     Amount and/or Complexity of Data Reviewed  Review and summarize past medical records: yes    Risk of Complications, Morbidity, and/or Mortality  Presenting problems: low  Diagnostic procedures: low  Management options: moderate    Patient Progress  Patient progress: improved      Disposition  Final diagnoses:   Cellulitis   Foreign body (FB) in soft tissue     Time reflects when diagnosis was documented in both MDM as applicable and the Disposition within this note     Time User Action Codes Description Comment    3/8/2021  3:56 AM Scarlett Mirela BOWMAN Add [L03 90] Cellulitis     3/8/2021  3:56 AM Susie Boston Add [M79 5] Foreign body (FB) in soft tissue       ED Disposition     ED Disposition Condition Date/Time Comment    Discharge Stable Mon Mar 8, 2021  3:56 AM Any Painting discharge to home/self care              Follow-up Information     Follow up With Specialties Details Why Contact Info Additional Information    Maris Oviedo MD Family Medicine Schedule an appointment as soon as possible for a visit   [address] [facility] [phone]        [address] Emergency Department Emergency Medicine  If symptoms worsen, For wound re-check [address] [place], [phone]  [address] [facility], [address] [facility] 10    [address]  Call  to establish a primary care provider [phone]             Patient's Medications   Discharge Prescriptions    CEPHALEXIN (KEFLEX) 500 MG CAPSULE    Take 1 capsule (500 mg total) by mouth 4 (four) times a day for 5 days       Start Date: 3/8/2021  End Date: 3/13/2021       Order Dose: 500 mg       Quantity: 20 capsule    Refills: 0     No discharge procedures on file      PDMP Review     None          ED Provider  Electronically Signed by           Tonie George MD  03/08/21 9883 DISPLAY PLAN FREE TEXT

## 2021-04-16 NOTE — DIETITIAN INITIAL EVALUATION ADULT. - PROBLEM SELECTOR PLAN 4
Left CTR, ring finger trigger release
RELEASE LEFT CARPAL TUNNEL, RELEASE LEFT RING FINGER TRIGGER
Current /81 droped from 182/119 without any intevrentions, will observe for now, if continue to be elevated will introduce ACE or ARB, (given Hx of DM), Pt also after 1L of IVF by ER will F/U BMP with Cl and BUN SCr and reassess if HCTZ can be restarted

## 2021-12-26 NOTE — CONSULT NOTE ADULT - PROBLEM SELECTOR PROBLEM 2
HTN (hypertension)
CAD (coronary artery disease)
Patient requests all Lab, Cardiology, and Radiology Results on their Discharge Instructions

## 2022-01-11 NOTE — H&P ADULT - PROBLEM SELECTOR PLAN 1
No complaints of bleeding or discharge, is having more nausea but no emesis  FHT normal   @ 7 0/7 wga   1. IOB labs reviewed normal; wants to do MT 21; enrolled in connected moms   2.  Had covid 8/2021; Declines covid and flu shot, understands serious nature ofcovid in pregnancy including intubation and death and adverse fetal outcomes. Discussed recommendation that if not vaccinated should wear N95 and avoid crowds and even large family gatherings. Understands that past infection does not mean immunity    3. N/V improving        
WIll TELE CE ECG LYTES will RPT CE at 2100 and reasses with rpt ECG, will talk to PMD in regard to further ischemic W/U based on CE and ECG as well as symptoms

## 2022-03-19 NOTE — DISCHARGE NOTE ADULT - MEDICATION SUMMARY - MEDICATIONS TO CHANGE
Dr. Delatorre called regarding outpatient with complaints of leaking of fluid and contractions, ROM test performed and is positive, MD informed of positive test, SVE, ctrx pattern and fetal status, MD made fully aware of patient status, MD ordered to admit patient, administer 25mcg cytotec Q4 hr until patient is 2cm then proceed with pitocin. RN RBTO x1    I will SWITCH the dose or number of times a day I take the medications listed below when I get home from the hospital:    Lopressor  -- 50 milligram(s) by mouth once a day    insulin isophane-insulin regular 70 units-30 units/mL human recombinant subcutaneous suspension  -- 35  subcutaneous once a day (before a meal)    insulin isophane-insulin regular 70 units-30 units/mL human recombinant subcutaneous suspension  -- 18  subcutaneous prn

## 2023-01-10 NOTE — ED PROVIDER NOTE - NS ED ATTENDING STATEMENT MOD
I have personally performed a face to face diagnostic evaluation on this patient. I have reviewed the ACP note and agree with the history, exam and plan of care, except as noted.
0

## 2023-08-31 NOTE — CONSULT NOTE ADULT - PROBLEM/RECOMMENDATION-4
DISPLAY PLAN FREE TEXT Primary Defect Width In Cm (Final Defect Size - Required For Flaps/Grafts): 1.4

## 2024-03-04 NOTE — ED PROVIDER NOTE - CHPI ED SYMPTOMS POS
Patient: Ella Simms  : 1939    Encounter Date: 2024    PROGRESS NOTE    Subjective  Chief complaint: Ella Simms is a 85 y.o. female who is a long term care patient being seen and evaluated for monthly general medical care and follow-up    HPI:  Patient presents for general medical care and f/u.  Patient seen and examined at bedside.  No issues per nursing.  Patient has no acute complaints.  Patient has ALZ/dementia and requires assist with ADLs.    Patient with diagnosis of depression, denies feeling down and thoughts of harming self or others.  HTN, denies chest pain and headache.  No new concerns today.  Denies n/v/f/c pain.            Objective  Vital signs: 124\68,64,94%    Physical Exam  Constitutional:       General: She is not in acute distress.  Eyes:      Extraocular Movements: Extraocular movements intact.   Cardiovascular:      Rate and Rhythm: Normal rate and regular rhythm.   Pulmonary:      Effort: Pulmonary effort is normal.      Breath sounds: Normal breath sounds.   Abdominal:      General: Bowel sounds are normal.      Palpations: Abdomen is soft.   Musculoskeletal:      Cervical back: Neck supple.      Right lower leg: No edema.      Left lower leg: No edema.   Neurological:      Mental Status: She is alert.   Psychiatric:         Mood and Affect: Mood normal.         Behavior: Behavior is cooperative.         Assessment/Plan  Problem List Items Addressed This Visit       Essential (primary) hypertension     Blood pressures controlled.    Continue metoprolol.  Monitor BP         Alzheimer's disease, unspecified (CMS/HCC)     Continue Aricept.  Mentation at baseline         Major depressive disorder     Mood is stable.    Continue Remeron, paroxetine, Benzatropine          Medications, treatments, and labs reviewed  Continue medications and treatments as listed in EMR      Scribe Attestation  I, Cathy Garcia   attest that this documentation has been prepared under  the direction and in the presence of Yanet Brand MD.     Provider Attestation - Scribe documentation  All medical record entries made by the Scribe were at my direction and personally dictated by me. I have reviewed the chart and agree that the record accurately reflects my personal performance of the history, physical exam, discussion and plan.   Yanet Brand MD      Electronically Signed By: Yanet Brand MD   3/4/24  5:45 PM   urinary frequency, urge to drink

## 2025-01-09 NOTE — ED ADULT TRIAGE NOTE - AS TEMP SITE
THIS IS MY ANGEL SUPERVISORY AND SHARED VISIT NOTE    I independently examined and evaluated Audi Flores.    In brief, patient is a 64-year-old male with history of hypertension hyperlipidemia diabetes COPD schizoaffective disorder that presents to the emergency department for psychiatric evaluation.  Patient states he feels that he is going to hell for watching the skin flicks.  Patient is concerned that medication has not been doing anything.  He states he mentally cannot feel anything.  Patient states he wants a different medication.  Patient does admit to being admitted and recently discharged from psychiatric facility.  Patient denies suicidal or homicidal ideations.  Patient denies any chest pain short of breath fever chills cough sore throat runny nose earache.  Patient here for evaluation.        CC/HPI Summary, DDx, ED Course, and Reassessment:   patient is a 64-year-old male with history of hypertension hyperlipidemia diabetes COPD schizoaffective disorder that presents to the emergency department for psychiatric evaluation.  Patient states he feels that he is going to hell for watching the skin flicks.  Patient is concerned that medication has not been doing anything.  He states he mentally cannot feel anything.  Patient states he wants a different medication.  Patient does admit to being admitted and recently discharged from psychiatric facility.  Patient denies suicidal or homicidal ideations.     On physical exam patient denies suicidal homicidal ideations but patient paranoia, some delusions.  Lungs are clear abdomen is benign    Differential diagnosis drug-induced psychosis, schizophrenia with paranoia and delusions hallucinations electrolyte imbalance, UTI    Patient was ordered laboratory studies, urinalysis and psychiatric evaluation.    Laboratory studies unremarkable for any acute process.  Patient medically clear for psychiatric evaluation.  Patient appeared anxious was given Vistaril 50 mg p.o.   oral

## 2025-05-02 ENCOUNTER — INPATIENT (INPATIENT)
Facility: HOSPITAL | Age: 65
LOS: 3 days | Discharge: HOME | End: 2025-05-06
Attending: INTERNAL MEDICINE | Admitting: INTERNAL MEDICINE
Payer: COMMERCIAL

## 2025-05-02 VITALS
HEART RATE: 84 BPM | OXYGEN SATURATION: 98 % | TEMPERATURE: 98 F | RESPIRATION RATE: 18 BRPM | WEIGHT: 177.91 LBS | DIASTOLIC BLOOD PRESSURE: 73 MMHG | SYSTOLIC BLOOD PRESSURE: 120 MMHG

## 2025-05-02 DIAGNOSIS — Z90.49 ACQUIRED ABSENCE OF OTHER SPECIFIED PARTS OF DIGESTIVE TRACT: Chronic | ICD-10-CM

## 2025-05-02 PROCEDURE — 99285 EMERGENCY DEPT VISIT HI MDM: CPT

## 2025-05-02 NOTE — ED ADULT TRIAGE NOTE - CHIEF COMPLAINT QUOTE
pt st" I have infection in arm started after I cut myself on wood out in garage I was cleaning out the garage on Tuesday. " Left wrist with + puncture wounds x3 + purulent drainage, swelling from wrist to forearm. +radial pulses, Denies numbness tingling.  Hx DM2 insulin dependant. MI stents on xerelto, htn, highcholesterol pt st" I have infection in arm started after I cut myself on wood out in garage I was cleaning out the garage on Tuesday. " Left wrist with + puncture wounds x3 + purulent drainage, swelling from wrist to forearm. +radial pulses,finger warm, + cap refill.  Denies numbness tingling.  Hx DM1 insulin dependant.( does not have insulin pump or glucose meter in place.) MI stents on xerelto, htn, highcholesterol

## 2025-05-03 DIAGNOSIS — L02.91 CUTANEOUS ABSCESS, UNSPECIFIED: ICD-10-CM

## 2025-05-03 DIAGNOSIS — I25.10 ATHEROSCLEROTIC HEART DISEASE OF NATIVE CORONARY ARTERY WITHOUT ANGINA PECTORIS: ICD-10-CM

## 2025-05-03 DIAGNOSIS — E78.5 HYPERLIPIDEMIA, UNSPECIFIED: ICD-10-CM

## 2025-05-03 DIAGNOSIS — L02.818 CUTANEOUS ABSCESS OF OTHER SITES: ICD-10-CM

## 2025-05-03 DIAGNOSIS — I10 ESSENTIAL (PRIMARY) HYPERTENSION: ICD-10-CM

## 2025-05-03 DIAGNOSIS — E11.9 TYPE 2 DIABETES MELLITUS WITHOUT COMPLICATIONS: ICD-10-CM

## 2025-05-03 LAB
A1C WITH ESTIMATED AVERAGE GLUCOSE RESULT: 9.1 % — HIGH (ref 4–5.6)
ADD ON TEST-SPECIMEN IN LAB: SIGNIFICANT CHANGE UP
ALBUMIN SERPL ELPH-MCNC: 3.9 G/DL — SIGNIFICANT CHANGE UP (ref 3.3–5)
ALP SERPL-CCNC: 94 U/L — SIGNIFICANT CHANGE UP (ref 40–120)
ALT FLD-CCNC: 17 U/L — SIGNIFICANT CHANGE UP (ref 4–41)
ANION GAP SERPL CALC-SCNC: 14 MMOL/L — SIGNIFICANT CHANGE UP (ref 7–14)
APTT BLD: 40.8 SEC — HIGH (ref 26.1–36.8)
AST SERPL-CCNC: 14 U/L — SIGNIFICANT CHANGE UP (ref 4–40)
BASOPHILS # BLD AUTO: 0.05 K/UL — SIGNIFICANT CHANGE UP (ref 0–0.2)
BASOPHILS NFR BLD AUTO: 0.3 % — SIGNIFICANT CHANGE UP (ref 0–2)
BILIRUB SERPL-MCNC: 0.7 MG/DL — SIGNIFICANT CHANGE UP (ref 0.2–1.2)
BLOOD GAS VENOUS COMPREHENSIVE RESULT: SIGNIFICANT CHANGE UP
BUN SERPL-MCNC: 18 MG/DL — SIGNIFICANT CHANGE UP (ref 7–23)
CALCIUM SERPL-MCNC: 9 MG/DL — SIGNIFICANT CHANGE UP (ref 8.4–10.5)
CHLORIDE SERPL-SCNC: 97 MMOL/L — LOW (ref 98–107)
CO2 SERPL-SCNC: 22 MMOL/L — SIGNIFICANT CHANGE UP (ref 22–31)
CREAT SERPL-MCNC: 1.01 MG/DL — SIGNIFICANT CHANGE UP (ref 0.5–1.3)
CRP SERPL-MCNC: 95.5 MG/L — HIGH
EGFR: 84 ML/MIN/1.73M2 — SIGNIFICANT CHANGE UP
EGFR: 84 ML/MIN/1.73M2 — SIGNIFICANT CHANGE UP
EOSINOPHIL # BLD AUTO: 0.1 K/UL — SIGNIFICANT CHANGE UP (ref 0–0.5)
EOSINOPHIL NFR BLD AUTO: 0.6 % — SIGNIFICANT CHANGE UP (ref 0–6)
ERYTHROCYTE [SEDIMENTATION RATE] IN BLOOD: 39 MM/HR — HIGH (ref 1–15)
ESTIMATED AVERAGE GLUCOSE: 214 — SIGNIFICANT CHANGE UP
GLUCOSE BLDC GLUCOMTR-MCNC: 217 MG/DL — HIGH (ref 70–99)
GLUCOSE BLDC GLUCOMTR-MCNC: 263 MG/DL — HIGH (ref 70–99)
GLUCOSE BLDC GLUCOMTR-MCNC: 308 MG/DL — HIGH (ref 70–99)
GLUCOSE SERPL-MCNC: 147 MG/DL — HIGH (ref 70–99)
GRAM STN FLD: ABNORMAL
HCT VFR BLD CALC: 36.7 % — LOW (ref 39–50)
HGB BLD-MCNC: 12.9 G/DL — LOW (ref 13–17)
IANC: 13.64 K/UL — HIGH (ref 1.8–7.4)
IMM GRANULOCYTES NFR BLD AUTO: 0.4 % — SIGNIFICANT CHANGE UP (ref 0–0.9)
INR BLD: 1.5 RATIO — HIGH (ref 0.85–1.16)
LYMPHOCYTES # BLD AUTO: 1.3 K/UL — SIGNIFICANT CHANGE UP (ref 1–3.3)
LYMPHOCYTES # BLD AUTO: 7.9 % — LOW (ref 13–44)
MCHC RBC-ENTMCNC: 28.8 PG — SIGNIFICANT CHANGE UP (ref 27–34)
MCHC RBC-ENTMCNC: 35.1 G/DL — SIGNIFICANT CHANGE UP (ref 32–36)
MCV RBC AUTO: 81.9 FL — SIGNIFICANT CHANGE UP (ref 80–100)
MONOCYTES # BLD AUTO: 1.2 K/UL — HIGH (ref 0–0.9)
MONOCYTES NFR BLD AUTO: 7.3 % — SIGNIFICANT CHANGE UP (ref 2–14)
MRSA PCR RESULT.: SIGNIFICANT CHANGE UP
NEUTROPHILS # BLD AUTO: 13.64 K/UL — HIGH (ref 1.8–7.4)
NEUTROPHILS NFR BLD AUTO: 83.5 % — HIGH (ref 43–77)
NRBC # BLD AUTO: 0 K/UL — SIGNIFICANT CHANGE UP (ref 0–0)
NRBC # FLD: 0 K/UL — SIGNIFICANT CHANGE UP (ref 0–0)
NRBC BLD AUTO-RTO: 0 /100 WBCS — SIGNIFICANT CHANGE UP (ref 0–0)
PLATELET # BLD AUTO: 255 K/UL — SIGNIFICANT CHANGE UP (ref 150–400)
POTASSIUM SERPL-MCNC: 3.6 MMOL/L — SIGNIFICANT CHANGE UP (ref 3.5–5.3)
POTASSIUM SERPL-SCNC: 3.6 MMOL/L — SIGNIFICANT CHANGE UP (ref 3.5–5.3)
PROT SERPL-MCNC: 7 G/DL — SIGNIFICANT CHANGE UP (ref 6–8.3)
PROTHROM AB SERPL-ACNC: 17.8 SEC — HIGH (ref 9.9–13.4)
RBC # BLD: 4.48 M/UL — SIGNIFICANT CHANGE UP (ref 4.2–5.8)
RBC # FLD: 12.8 % — SIGNIFICANT CHANGE UP (ref 10.3–14.5)
S AUREUS DNA NOSE QL NAA+PROBE: SIGNIFICANT CHANGE UP
SODIUM SERPL-SCNC: 133 MMOL/L — LOW (ref 135–145)
SPECIMEN SOURCE: SIGNIFICANT CHANGE UP
WBC # BLD: 16.36 K/UL — HIGH (ref 3.8–10.5)
WBC # FLD AUTO: 16.36 K/UL — HIGH (ref 3.8–10.5)

## 2025-05-03 PROCEDURE — 73090 X-RAY EXAM OF FOREARM: CPT | Mod: 26,LT

## 2025-05-03 PROCEDURE — 73201 CT UPPER EXTREMITY W/DYE: CPT | Mod: 26,LT

## 2025-05-03 PROCEDURE — 73110 X-RAY EXAM OF WRIST: CPT | Mod: 26,LT

## 2025-05-03 PROCEDURE — 99223 1ST HOSP IP/OBS HIGH 75: CPT

## 2025-05-03 RX ORDER — SODIUM CHLORIDE 9 G/1000ML
1000 INJECTION, SOLUTION INTRAVENOUS
Refills: 0 | Status: DISCONTINUED | OUTPATIENT
Start: 2025-05-03 | End: 2025-05-06

## 2025-05-03 RX ORDER — INSULIN LISPRO 100 U/ML
INJECTION, SOLUTION INTRAVENOUS; SUBCUTANEOUS
Refills: 0 | Status: DISCONTINUED | OUTPATIENT
Start: 2025-05-03 | End: 2025-05-03

## 2025-05-03 RX ORDER — ACETAMINOPHEN 500 MG/5ML
650 LIQUID (ML) ORAL EVERY 6 HOURS
Refills: 0 | Status: DISCONTINUED | OUTPATIENT
Start: 2025-05-03 | End: 2025-05-06

## 2025-05-03 RX ORDER — INSULIN LISPRO 100 U/ML
INJECTION, SOLUTION INTRAVENOUS; SUBCUTANEOUS AT BEDTIME
Refills: 0 | Status: DISCONTINUED | OUTPATIENT
Start: 2025-05-03 | End: 2025-05-03

## 2025-05-03 RX ORDER — DEXTROSE 50 % IN WATER 50 %
12.5 SYRINGE (ML) INTRAVENOUS ONCE
Refills: 0 | Status: DISCONTINUED | OUTPATIENT
Start: 2025-05-03 | End: 2025-05-06

## 2025-05-03 RX ORDER — CEFTRIAXONE 500 MG/1
1000 INJECTION, POWDER, FOR SOLUTION INTRAMUSCULAR; INTRAVENOUS ONCE
Refills: 0 | Status: COMPLETED | OUTPATIENT
Start: 2025-05-03 | End: 2025-05-03

## 2025-05-03 RX ORDER — DIPHENHYDRAMINE HCL 12.5MG/5ML
50 ELIXIR ORAL ONCE
Refills: 0 | Status: COMPLETED | OUTPATIENT
Start: 2025-05-03 | End: 2025-05-03

## 2025-05-03 RX ORDER — DEXTROSE 50 % IN WATER 50 %
25 SYRINGE (ML) INTRAVENOUS ONCE
Refills: 0 | Status: DISCONTINUED | OUTPATIENT
Start: 2025-05-03 | End: 2025-05-06

## 2025-05-03 RX ORDER — CLINDAMYCIN PHOSPHATE 150 MG/ML
600 VIAL (ML) INJECTION ONCE
Refills: 0 | Status: COMPLETED | OUTPATIENT
Start: 2025-05-03 | End: 2025-05-03

## 2025-05-03 RX ORDER — INSULIN GLARGINE-YFGN 100 [IU]/ML
20 INJECTION, SOLUTION SUBCUTANEOUS AT BEDTIME
Refills: 0 | Status: DISCONTINUED | OUTPATIENT
Start: 2025-05-03 | End: 2025-05-04

## 2025-05-03 RX ORDER — ATORVASTATIN CALCIUM 80 MG/1
1 TABLET, FILM COATED ORAL
Refills: 0 | DISCHARGE

## 2025-05-03 RX ORDER — AMPICILLIN SODIUM AND SULBACTAM SODIUM 1; .5 G/1; G/1
3 INJECTION, POWDER, FOR SOLUTION INTRAMUSCULAR; INTRAVENOUS ONCE
Refills: 0 | Status: COMPLETED | OUTPATIENT
Start: 2025-05-03 | End: 2025-05-03

## 2025-05-03 RX ORDER — VANCOMYCIN HCL IN 5 % DEXTROSE 1.5G/250ML
1000 PLASTIC BAG, INJECTION (ML) INTRAVENOUS ONCE
Refills: 0 | Status: COMPLETED | OUTPATIENT
Start: 2025-05-03 | End: 2025-05-03

## 2025-05-03 RX ORDER — METOPROLOL SUCCINATE 50 MG/1
100 TABLET, EXTENDED RELEASE ORAL DAILY
Refills: 0 | Status: DISCONTINUED | OUTPATIENT
Start: 2025-05-03 | End: 2025-05-06

## 2025-05-03 RX ORDER — INSULIN LISPRO 100 U/ML
8 INJECTION, SOLUTION INTRAVENOUS; SUBCUTANEOUS
Refills: 0 | Status: DISCONTINUED | OUTPATIENT
Start: 2025-05-03 | End: 2025-05-06

## 2025-05-03 RX ORDER — ASPIRIN 325 MG
81 TABLET ORAL DAILY
Refills: 0 | Status: DISCONTINUED | OUTPATIENT
Start: 2025-05-03 | End: 2025-05-06

## 2025-05-03 RX ORDER — AMLODIPINE BESYLATE 10 MG/1
1 TABLET ORAL
Refills: 0 | DISCHARGE

## 2025-05-03 RX ORDER — DEXTROSE 50 % IN WATER 50 %
15 SYRINGE (ML) INTRAVENOUS ONCE
Refills: 0 | Status: DISCONTINUED | OUTPATIENT
Start: 2025-05-03 | End: 2025-05-06

## 2025-05-03 RX ORDER — INSULIN LISPRO 100 U/ML
INJECTION, SOLUTION INTRAVENOUS; SUBCUTANEOUS
Refills: 0 | Status: DISCONTINUED | OUTPATIENT
Start: 2025-05-03 | End: 2025-05-06

## 2025-05-03 RX ORDER — VANCOMYCIN HCL IN 5 % DEXTROSE 1.5G/250ML
1000 PLASTIC BAG, INJECTION (ML) INTRAVENOUS EVERY 12 HOURS
Refills: 0 | Status: DISCONTINUED | OUTPATIENT
Start: 2025-05-03 | End: 2025-05-04

## 2025-05-03 RX ORDER — AMPICILLIN SODIUM AND SULBACTAM SODIUM 1; .5 G/1; G/1
3 INJECTION, POWDER, FOR SOLUTION INTRAMUSCULAR; INTRAVENOUS EVERY 6 HOURS
Refills: 0 | Status: DISCONTINUED | OUTPATIENT
Start: 2025-05-03 | End: 2025-05-06

## 2025-05-03 RX ORDER — ENOXAPARIN SODIUM 100 MG/ML
40 INJECTION SUBCUTANEOUS EVERY 24 HOURS
Refills: 0 | Status: DISCONTINUED | OUTPATIENT
Start: 2025-05-03 | End: 2025-05-06

## 2025-05-03 RX ORDER — ACETAMINOPHEN 500 MG/5ML
1000 LIQUID (ML) ORAL ONCE
Refills: 0 | Status: COMPLETED | OUTPATIENT
Start: 2025-05-03 | End: 2025-05-03

## 2025-05-03 RX ORDER — ATORVASTATIN CALCIUM 80 MG/1
40 TABLET, FILM COATED ORAL AT BEDTIME
Refills: 0 | Status: DISCONTINUED | OUTPATIENT
Start: 2025-05-03 | End: 2025-05-06

## 2025-05-03 RX ORDER — GLUCAGON 3 MG/1
1 POWDER NASAL ONCE
Refills: 0 | Status: DISCONTINUED | OUTPATIENT
Start: 2025-05-03 | End: 2025-05-06

## 2025-05-03 RX ORDER — KETOROLAC TROMETHAMINE 30 MG/ML
15 INJECTION, SOLUTION INTRAMUSCULAR; INTRAVENOUS ONCE
Refills: 0 | Status: DISCONTINUED | OUTPATIENT
Start: 2025-05-03 | End: 2025-05-03

## 2025-05-03 RX ADMIN — INSULIN GLARGINE-YFGN 20 UNIT(S): 100 INJECTION, SOLUTION SUBCUTANEOUS at 23:06

## 2025-05-03 RX ADMIN — Medication 4 MILLIGRAM(S): at 11:32

## 2025-05-03 RX ADMIN — Medication 1 APPLICATION(S): at 17:35

## 2025-05-03 RX ADMIN — Medication 50 MILLIGRAM(S): at 08:19

## 2025-05-03 RX ADMIN — INSULIN LISPRO 8 UNIT(S): 100 INJECTION, SOLUTION INTRAVENOUS; SUBCUTANEOUS at 17:21

## 2025-05-03 RX ADMIN — CEFTRIAXONE 100 MILLIGRAM(S): 500 INJECTION, POWDER, FOR SOLUTION INTRAMUSCULAR; INTRAVENOUS at 01:50

## 2025-05-03 RX ADMIN — AMPICILLIN SODIUM AND SULBACTAM SODIUM 200 GRAM(S): 1; .5 INJECTION, POWDER, FOR SOLUTION INTRAMUSCULAR; INTRAVENOUS at 23:03

## 2025-05-03 RX ADMIN — AMPICILLIN SODIUM AND SULBACTAM SODIUM 200 GRAM(S): 1; .5 INJECTION, POWDER, FOR SOLUTION INTRAMUSCULAR; INTRAVENOUS at 11:29

## 2025-05-03 RX ADMIN — Medication 400 MILLIGRAM(S): at 08:20

## 2025-05-03 RX ADMIN — Medication 100 MILLIGRAM(S): at 02:53

## 2025-05-03 RX ADMIN — Medication 250 MILLIGRAM(S): at 17:35

## 2025-05-03 RX ADMIN — INSULIN LISPRO 3: 100 INJECTION, SOLUTION INTRAVENOUS; SUBCUTANEOUS at 15:41

## 2025-05-03 RX ADMIN — KETOROLAC TROMETHAMINE 15 MILLIGRAM(S): 30 INJECTION, SOLUTION INTRAMUSCULAR; INTRAVENOUS at 13:17

## 2025-05-03 RX ADMIN — Medication 50 MILLIGRAM(S): at 01:50

## 2025-05-03 RX ADMIN — ATORVASTATIN CALCIUM 40 MILLIGRAM(S): 80 TABLET, FILM COATED ORAL at 21:05

## 2025-05-03 RX ADMIN — INSULIN LISPRO 8: 100 INJECTION, SOLUTION INTRAVENOUS; SUBCUTANEOUS at 23:05

## 2025-05-03 RX ADMIN — INSULIN LISPRO 4: 100 INJECTION, SOLUTION INTRAVENOUS; SUBCUTANEOUS at 17:21

## 2025-05-03 RX ADMIN — AMPICILLIN SODIUM AND SULBACTAM SODIUM 200 GRAM(S): 1; .5 INJECTION, POWDER, FOR SOLUTION INTRAMUSCULAR; INTRAVENOUS at 17:49

## 2025-05-03 RX ADMIN — Medication 250 MILLIGRAM(S): at 12:41

## 2025-05-03 NOTE — ED ADULT NURSE REASSESSMENT NOTE - NS ED NURSE REASSESS COMMENT FT1
Pt with mild pain now to his left arm . Pt with swelling abscess to arm plastic surgeon at bedside .  Pt awaiting dispo.

## 2025-05-03 NOTE — ED ADULT NURSE NOTE - OBJECTIVE STATEMENT
Pt received in spot 17A, aaox3, ambulatory, breathing even and unlabored in bed. Pt came with concern for left wrist infection. Pt states a few days ago he was cutting wood and a few pieces got stuck in his wrist/forearm area and cut him. He noticed recently the area started to have pus and discharge as well as redness and swelling. Pt able to move extremity, positive sensation present. Pt denies chest pain, SOB, dizziness, headache, blurry vision, chills. Bed in lowest position, call bell within reach. #20G IV placed in the right AC, labs drawn and sent.

## 2025-05-03 NOTE — PATIENT PROFILE ADULT - FUNCTIONAL ASSESSMENT - BASIC MOBILITY SECTION LABEL
Patient is a 73yo F with a pmhx of DM, HTN, hyperlipidemia, OA,  right THR, slipped and fell at home today and was unable to ambulate. Pt reports associated severe pain in the left hip when she moves her leg. She denies any associated chest pain, sob, dizziness, abd pain, vomiting, or fever.Patient BIBA to ED. Pt given morphine in ED which helped relieve her pain.  Patient is a 75yo F with a pmhx of DM, HTN, hyperlipidemia, OA,  right hip replacement, slipped and fell at home today and was unable to ambulate. Pt reports associated severe pain in the left hip when she moves her leg. Pt states she normally uses walker but today used a cane prior to falling. She denies any associated chest pain, sob, dizziness, abd pain, vomiting, or fever.Patient BIBA to ED. Pt given morphine in ED which helped relieve her pain.  .

## 2025-05-03 NOTE — ED ADULT NURSE REASSESSMENT NOTE - NS ED NURSE REASSESS COMMENT FT1
received report from day shift RN Trisha. pt A&Ox4, vitally stable ,satting 100% on 3L NC, home oxygen use. per MD Vera pt to receive subcutaneous relistor and see if patient able to have bowel movement 1x post medication. if not successful BM, pt to receive CT scan. pt administered IV zofran for nausea, no vomiting present at this time. pt endorses partial relief of abdominal pain at this time. safety maintained. patient and family at bedside updated on patient care received report from day shift RN. pt A&Ox4, vitally stable. +ROM and sensation to L hand. L arm wrapped and dressed. denies fevers, chills, nausea, vomiting ,diarrhea. FS and VS obtained. pt pending transport. offers no complaints at this time safety maintained

## 2025-05-03 NOTE — ED ADULT NURSE NOTE - NSFALLUNIVINTERV_ED_ALL_ED
Bed/Stretcher in lowest position, wheels locked, appropriate side rails in place/Call bell, personal items and telephone in reach/Instruct patient to call for assistance before getting out of bed/chair/stretcher/Non-slip footwear applied when patient is off stretcher/[place] to call system/Physically safe environment - no spills, clutter or unnecessary equipment/Purposeful proactive rounding/Room/bathroom lighting operational, light cord in reach

## 2025-05-03 NOTE — H&P ADULT - NSHPPHYSICALEXAM_GEN_ALL_CORE
VITAL SIGNS:  T(C): 37.1 (05-03-25 @ 16:53), Max: 37.1 (05-03-25 @ 08:35)  T(F): 98.7 (05-03-25 @ 16:53), Max: 98.7 (05-03-25 @ 08:35)  HR: 74 (05-03-25 @ 16:53) (72 - 89)  BP: 114/68 (05-03-25 @ 16:53) (101/64 - 122/76)  BP(mean): --  RR: 18 (05-03-25 @ 16:53) (16 - 18)  SpO2: 100% (05-03-25 @ 16:53) (98% - 100%)  Wt(kg): --    PHYSICAL EXAM:  Constitutional: NAD  Respiratory: CTA B/L; no W/R/R  Cardiac: +S1/S2; RRR; no M/R/G  Gastrointestinal: soft, NT/ND; no rebound or guarding; +BSx4  Extremities: Left forearm-3 abscesses s/p I&D, +swelling  Neurologic: AAOx3; CNII-XII grossly intact; no focal deficits  Psychiatric: affect and characteristics of appearance, verbalizations, behaviors are appropriate

## 2025-05-03 NOTE — H&P ADULT - PROBLEM SELECTOR PLAN 4
s/p stent 2018  -cont aspirin  -obtain ekg  -hold xarelto for now for possible repeat surgical interventions  -cardiology consulted for preop assessment

## 2025-05-03 NOTE — H&P ADULT - PROBLEM SELECTOR PLAN 1
presenting fever/chills. noted to have left forearm abscesses. afebrile, leukocytosis. elev ESR/CRP.     CT forearm shows Volar distal forearm wound, with localized 1.5 cm subcutaneous abscess. Associated extensive subcutaneous edema which tracks proximally into the upper arm, as described.  do not appear to involve the deep muscle compartment. No soft tissue gas.    -s/p ID by plastic surgery  -cont IV vancomycin qg bid (fu trough), cont IV unasyn  -fu Blood culture and abscess culture  -ID consulted (will be seen tomorrow)  -possible repeat surgical intervention per plastic   -monitor vitals, trend WBC   -elevate arm  -pain control with tylenol, can offer oxy if needed

## 2025-05-03 NOTE — H&P ADULT - PROBLEM SELECTOR PLAN 2
at home takes humulin 70/30 35u qam and 25u qhs    -fu a1c  -order lantus 2ou qhs and lispro 8u tid ac, mISS. FSG tid ac and qhs (uptitrate as needed, strict FSG goal)  - cc diet

## 2025-05-03 NOTE — PATIENT PROFILE ADULT - VISION (WITH CORRECTIVE LENSES IF THE PATIENT USUALLY WEARS THEM):
"[facility], Bernice   10/22/2023  Neurosurgery Progress Note:    Assessment:  Mirian Cunningham is a 46 year old female with complex psychosocial situation including paranoid schizophrenia and lack of active guardianship presenting with known large sellar/suprasellar mass who has not yet assented to surgical intervention on this lesion and has no clear medical decision maker for complex decisions. Social work, ethics, and psychiatry teams involved in operative planning. Neuro exam has been stable.     Per psychiatry assessment, \"Pt lacks the decision making capacity to complete full Advance Directive, however retains the ability name her next of kin/surrogate medical decision maker when   deemed unconscious.  \"    Plan:  q4h neuro exams   Pain control  Keppra 500mg BID for seizure ppx  Prednisone per endocrinology recs   AM cortisol on 10/23 @ 8 AM   MRI without contrast with thin cuts in all planes & FIESTA sequence   Maintain SBP < 160  Regular diet  Normonatremia   Continue to monitor intake/output  Continue to monitor for fevers and/or signs of infection  Continue glucose checks  Platelets > 100,000  INR < 1.5  Hemoglobin > 8  DVT: SCDs while in bed  -----------------------------------  Fernando Welch MD, PGY-1  Department of Neurosurgery  Pager: [phone]    Please contact neurosurgery resident on call with questions.    Dial * * [phone], enter [phone] when prompted.   -----------------------------------    Interval History: No acute events overnight. Refusing contrasted studies.     Objective:   Temp:  [97.7  F (36.5  C)-98  F (36.7  C)] 98  F (36.7  C)  Pulse:  [64-72] 71  Resp:  [14-20] 14  BP: ()/(65-90) 112/73  SpO2:  [92 %-100 %] 99 %  No intake/output data recorded.    NEUROLOGIC:  -- Awake; Alert; oriented x 3  -- Follows commands briskly  -- +repetition, calculation, and naming  -- Speech fluent, spontaneous. No aphasia or dysarthria.  -- no gaze preference. No apparent " hemineglect.  Cranial Nerves:  -- R eye blindness; L eye temporal field cut. R RAPD.  -- gaze is intermittently dysconjugate w R exotropia   -- face symmetrical, tongue midline  -- hearing grossly intact bilat  -- Trapezii 5/5 strength bilat symmetric     Motor:  Normal bulk / tone; no tremor, rigidity, or bradykinesia.  No muscle wasting or fasciculations       Delt Bi Tri Hand Flexion/  Extension Iliopsoas Quadriceps Hamstrings Tibialis Anterior Gastroc     C5 C6 C7 C8/T1 L2 L3 L4-S1 L4 S1   R 5 5 5 5 5 5 5 5 5   L 5 5 5 5 5 5 5 5 5      Sensory:  intact to LT x 4 extremities      Reflexes: no hyperreflexia, no Hoffmans, mute Babinski bilaterally       LABS:  Recent Labs   Lab 10/22/23  0618 10/21/23  2244 10/21/23  1604 10/21/23  0600     --   --  139   POTASSIUM 4.6  --   --  4.4   CHLORIDE 100  --   --  101   CO2 28  --   --  27   ANIONGAP 12  --   --  11   GLC 87 135* 151* 123*   BUN 21.1*  --   --  21.6*   CR 0.82  --   --  0.78   FADUMO 9.9  --   --  9.9       Recent Labs   Lab 10/22/23  0618   WBC 9.7   RBC 4.70   HGB 13.6   HCT 44.9   MCV 96   MCH 28.9   MCHC 30.3*   RDW 13.8          IMAGING:  No results found for this or any previous visit (from the past 24 hour(s)).      Normal vision: sees adequately in most situations; can see medication labels, newsprint

## 2025-05-03 NOTE — ED PROVIDER NOTE - PROGRESS NOTE DETAILS
Connie Colunga, PGY2    Hand surgery Dr. Dickerson consulted for possible washout of forearm, will evaluate in the ED. labs remarkable for leukocytosis to 16. Otherwise pt resting comfortably. Connie Colunga, PGY2    Pt states he gets itchy w contrast, not true allergy, usually gets 50 benadryl, pt amenable to just benadryl prior to CT. lum do pgy-2: Pt states he has gotten contrast in the past with benadryl and has done fine. Will give benadryl here prior to contrast administration, pt is aware and fine with it. lum do pgy-2: Jag came to ED and performed bedside I&D to 3 abscess to distal forearm, wound cultures sent. Recommended admission for IV abxs. Gave IV vanc and unasyn and morphine for pain control.

## 2025-05-03 NOTE — H&P ADULT - NSHPLABSRESULTS_GEN_ALL_CORE
LABS:                        12.9   16.36 )-----------( 255      ( 03 May 2025 01:54 )             36.7     05-03    133[L]  |  97[L]  |  18  ----------------------------<  147[H]  3.6   |  22  |  1.01    Ca    9.0      03 May 2025 01:54    TPro  7.0  /  Alb  3.9  /  TBili  0.7  /  DBili  x   /  AST  14  /  ALT  17  /  AlkPhos  94  05-03    PT/INR - ( 03 May 2025 01:54 )   PT: 17.8 sec;   INR: 1.50 ratio         PTT - ( 03 May 2025 01:54 )  PTT:40.8 sec  Urinalysis Basic - ( 03 May 2025 01:54 )    Color: x / Appearance: x / SG: x / pH: x  Gluc: 147 mg/dL / Ketone: x  / Bili: x / Urobili: x   Blood: x / Protein: x / Nitrite: x   Leuk Esterase: x / RBC: x / WBC x   Sq Epi: x / Non Sq Epi: x / Bacteria: x      CAPILLARY BLOOD GLUCOSE      POCT Blood Glucose.: 217 mg/dL (03 May 2025 16:43)      RADIOLOGY & ADDITIONAL TESTS: Reviewed.    CT forearm:   Volar distal forearm wound, with localized 1.5 cm   subcutaneous abscess. Associated extensive subcutaneous edema which   tracks proximally into the upper arm.

## 2025-05-03 NOTE — ED ADULT NURSE NOTE - CHIEF COMPLAINT QUOTE
pt st" I have infection in arm started after I cut myself on wood out in garage I was cleaning out the garage on Tuesday. " Left wrist with + puncture wounds x3 + purulent drainage, swelling from wrist to forearm. +radial pulses,finger warm, + cap refill.  Denies numbness tingling.  Hx DM1 insulin dependant.( does not have insulin pump or glucose meter in place.) MI stents on xerelto, htn, highcholesterol

## 2025-05-03 NOTE — PHARMACOTHERAPY INTERVENTION NOTE - COMMENTS
Medication history is complete. Medication list updated in Outpatient Medication Record (OMR) via [facility], [facility] mail order, and patient.     To Note:  -Patient provided picture of Humulin 70/30 Kwikpen Box from [facility]. He reports this is the only insulin he is currently using.     Home Medications:  amLODIPine 10 mg oral tablet: 1 tab(s) orally once a day  atorvastatin 40 mg oral tablet: 1 tab(s) orally once a day (at bedtime)   HumuLIN 70/30 KwikPen 70 units-30 units/mL subcutaneous suspension: Inject 35 units subcutaneous in the morning and 25 units subcutaneous in the evening  losartan-hydroCHLOROthiazide 100 mg-25 mg oral tablet: 1 tab(s) orally once a day  Metoprolol Succinate  mg oral tablet, extended release: 1 tab(s) orally once a day  Xarelto 20 mg oral tablet: 1 tab(s) orally once a day takes in AM   aspirin 81 mg oral delayed release tablet: 1 tab(s) orally once a day     Please call spectra [phone] if you have any questions.

## 2025-05-03 NOTE — ED PROVIDER NOTE - PHYSICAL EXAMINATION
Gen: AAOx3, non-toxic  Head: NCAT  HEENT: EOMI, oral mucosa moist, normal conjunctiva  Lung: CTAB, no respiratory distress, no wheezes/rhonchi/rales B/L,   CV: RRR, no murmurs, rubs or gallops  Abd: soft, NTND, no guarding, no CVA tenderness  MSK: no visible deformities  Neuro: No focal sensory or motor deficits  Skin: large abscess w central ulceration, w active purulent drainage, w surrounding erythema swelling and ttp to distal forearm, separate punctate lesion to R lateral forearm and L lateral forearm w no drainage   Psych: normal affect.

## 2025-05-03 NOTE — CONSULT NOTE ADULT - SUBJECTIVE AND OBJECTIVE BOX
See dictated note.  Written informed consent obtained.  Tolerated exploration, debridement and I&D of extensively infected multiple wounds.  Culture sent.  Rec: Admit/IV abx, Unasyn+Vanco/Elevation/ID consult.  I have called and discussed with cardiology Dr Quinn to see pt.  Pt will likely require further surgical interventions.  Will follow.

## 2025-05-03 NOTE — ED PROVIDER NOTE - OBJECTIVE STATEMENT
64-year-old male past medical history of hypertension hyperlipidemia diabetes CAD with stent presents ED complaining of left forearm infection.  Patient was in the garage, struck left forearm onto wood, had skin tear subsequently over the next few days, noticed 3 sites of abscess formation in discrete locations of the left forearm, associated fevers Tuesday and Wednesday, no fevers Thursday Friday, had purulent drainage at the abscess site, today noticed worsening induration tracking up to proximal forearm prompting visit to ED.  Has been applying topical antibiotic ointment with no improvement.  Denies chest pain shortness of breath abdominal pain dysuria hematuria back pain numbness or tingling weakness.

## 2025-05-03 NOTE — ED PROVIDER NOTE - CLINICAL SUMMARY MEDICAL DECISION MAKING FREE TEXT BOX
Sadaf: L forearm punctured w/ wood f/b F, purulent drainage. Did not respond fully to topical ABx. Plan: I & D, CT, labs, IV ABx. Obs Unit vs. admit. Sadaf: L forearm punctured w/ wood f/b F, purulent drainage. Did not respond fully to topical ABx. POCUS: no discrete, drainable fluid collection. Plan: I & D, CT, labs, IV ABx. Obs Unit vs. admit.

## 2025-05-03 NOTE — ED ADULT NURSE NOTE - NSFALLASSESSNEED_ED_ALL_ED
synovitis was present which was debrided. Femoral neck cut was performed based on preoperative templating. Plan was made to use a DePuy S-rom femoral stem due to the significant valgus alignment and anteversion. There was significant femoral anteversion of approximately 40 degrees. The acetabulum was then exposed and the anterior and posteroinferior acetabular retractors were placed. The remaining labrum and foveal tissue were removed. Due to the subluxation that had been present, there was an osteophyte that had developed in front of the medial wall of the acetabulum which was removed. Progressive reaming was performed to the medial wall up to a size 52 mm reamer in the location of the original acetabulum. There was good bleeding bone all around. The acetabulum was thoroughly irrigated. A 52 mm [place] cup was impacted into position with good press-fit. Two bone screws were placed with good purchase. A trial liner was placed and the femur was produced. Box osteotome was used to access the intramedullary canal and a canal finder was placed. Progressive reaming up to a size 11.5 mm reamer was performed with a good fit. Proximal reamer was utilized and the calcar moon was placed up to a large spout. Trial components were placed and the hip was reduced. There was good stability in all positions with a +9 mm head. An intraoperative x-ray was performed and it did appear that leg lengths were reasonable in comparison to preoperative imaging. It did appear that there was room in the femoral canal for a larger stem. Trials were removed. Surrounding osteophytes were removed from the acetabulum and the anterosuperior rim from the previous subluxation was taken down as well. The acetabular shell was irrigated and the 36 mm polyethylene component was impacted into position. Additional reaming was performed up to a size 13.5 mm reamer with good cortical contact.  Proximal reaming and milling was performed as well and new trial no

## 2025-05-03 NOTE — ED PROVIDER NOTE - ATTENDING CONTRIBUTION TO CARE
I performed a face-to-face evaluation of the patient and performed a history and physical examination. I agree with the history and physical examination. If this was a PA visit, I personally saw the patient with the PA and performed a substantive portion of the visit including all aspects of the medical decision making.    L forearm punctured w/ wood f/b F, purulent drainage. Did not respond fully to topical ABx. Plan: I & D, CT, labs, IV ABx. Obs Unit vs. admit. I performed a face-to-face evaluation of the patient and performed a history and physical examination. I agree with the history and physical examination. If this was a PA visit, I personally saw the patient with the PA and performed a substantive portion of the visit including all aspects of the medical decision making.    L forearm punctured w/ wood f/b F, purulent drainage. Did not respond fully to topical ABx. POCUS: no discrete, drainable fluid collection. Plan: I & D, CT, labs, IV ABx. Obs Unit vs. admit.

## 2025-05-03 NOTE — H&P ADULT - ASSESSMENT
64 M with HTN, HLD, T2DM, CAD s/p stent 2018, p/w c/f left forearm infection for 4d, found to multiple abscess, s/p I&d,

## 2025-05-03 NOTE — H&P ADULT - HISTORY OF PRESENT ILLNESS
64 M with HTN, HLD, T2DM, CAD p/w c/f left forearm infection. Noted 3 abscess sites forming on left forearm with purulent drainage. No improvement with topical ointments. Plastics did I&D in ED, rec IV abx and admission. 64 M with HTN, HLD, T2DM, CAD s/p stent 2018, p/w c/f left forearm infection for 4d. was working in garage, moving wood when his left fore arm struck to wood which caused small skin tear which subsequently got worse over 2 days. noted to have 3 abscess around left forearm. pt applied topical antibiotics which didn't improve. started developing swelling and noted to have purulent drainage. aslo reports fever/chills. denies myalgia, arthral, no limited rom, no numbness/tingling of fingers. denies prior hx of abscess or skin conditions. denies iv drug use. denies chest pain or sob.     in the ed, afebrile, VSS. received IV abx, plastic surgery performed I&D.

## 2025-05-04 LAB
A1C WITH ESTIMATED AVERAGE GLUCOSE RESULT: 8.8 % — HIGH (ref 4–5.6)
ANION GAP SERPL CALC-SCNC: 13 MMOL/L — SIGNIFICANT CHANGE UP (ref 7–14)
BUN SERPL-MCNC: 21 MG/DL — SIGNIFICANT CHANGE UP (ref 7–23)
CALCIUM SERPL-MCNC: 7.7 MG/DL — LOW (ref 8.4–10.5)
CHLORIDE SERPL-SCNC: 100 MMOL/L — SIGNIFICANT CHANGE UP (ref 98–107)
CO2 SERPL-SCNC: 19 MMOL/L — LOW (ref 22–31)
CREAT SERPL-MCNC: 1.04 MG/DL — SIGNIFICANT CHANGE UP (ref 0.5–1.3)
EGFR: 81 ML/MIN/1.73M2 — SIGNIFICANT CHANGE UP
EGFR: 81 ML/MIN/1.73M2 — SIGNIFICANT CHANGE UP
ESTIMATED AVERAGE GLUCOSE: 206 — SIGNIFICANT CHANGE UP
GLUCOSE BLDC GLUCOMTR-MCNC: 107 MG/DL — HIGH (ref 70–99)
GLUCOSE BLDC GLUCOMTR-MCNC: 128 MG/DL — HIGH (ref 70–99)
GLUCOSE BLDC GLUCOMTR-MCNC: 208 MG/DL — HIGH (ref 70–99)
GLUCOSE BLDC GLUCOMTR-MCNC: 230 MG/DL — HIGH (ref 70–99)
GLUCOSE SERPL-MCNC: 215 MG/DL — HIGH (ref 70–99)
HCT VFR BLD CALC: 32.8 % — LOW (ref 39–50)
HGB BLD-MCNC: 11.6 G/DL — LOW (ref 13–17)
MAGNESIUM SERPL-MCNC: 2 MG/DL — SIGNIFICANT CHANGE UP (ref 1.6–2.6)
MCHC RBC-ENTMCNC: 28.6 PG — SIGNIFICANT CHANGE UP (ref 27–34)
MCHC RBC-ENTMCNC: 35.4 G/DL — SIGNIFICANT CHANGE UP (ref 32–36)
MCV RBC AUTO: 80.8 FL — SIGNIFICANT CHANGE UP (ref 80–100)
NRBC # BLD AUTO: 0 K/UL — SIGNIFICANT CHANGE UP (ref 0–0)
NRBC # FLD: 0 K/UL — SIGNIFICANT CHANGE UP (ref 0–0)
NRBC BLD AUTO-RTO: 0 /100 WBCS — SIGNIFICANT CHANGE UP (ref 0–0)
PHOSPHATE SERPL-MCNC: 2.4 MG/DL — LOW (ref 2.5–4.5)
PLATELET # BLD AUTO: 237 K/UL — SIGNIFICANT CHANGE UP (ref 150–400)
POTASSIUM SERPL-MCNC: 3.4 MMOL/L — LOW (ref 3.5–5.3)
POTASSIUM SERPL-SCNC: 3.4 MMOL/L — LOW (ref 3.5–5.3)
RBC # BLD: 4.06 M/UL — LOW (ref 4.2–5.8)
RBC # FLD: 12.6 % — SIGNIFICANT CHANGE UP (ref 10.3–14.5)
SODIUM SERPL-SCNC: 132 MMOL/L — LOW (ref 135–145)
VANCOMYCIN TROUGH SERPL-MCNC: 7.4 UG/ML — LOW (ref 10–20)
WBC # BLD: 12.72 K/UL — HIGH (ref 3.8–10.5)
WBC # FLD AUTO: 12.72 K/UL — HIGH (ref 3.8–10.5)

## 2025-05-04 PROCEDURE — 99222 1ST HOSP IP/OBS MODERATE 55: CPT

## 2025-05-04 RX ORDER — INSULIN GLARGINE-YFGN 100 [IU]/ML
35 INJECTION, SOLUTION SUBCUTANEOUS AT BEDTIME
Refills: 0 | Status: DISCONTINUED | OUTPATIENT
Start: 2025-05-04 | End: 2025-05-05

## 2025-05-04 RX ORDER — SOD PHOS DI, MONO/K PHOS MONO 250 MG
1 TABLET ORAL ONCE
Refills: 0 | Status: COMPLETED | OUTPATIENT
Start: 2025-05-04 | End: 2025-05-04

## 2025-05-04 RX ORDER — VANCOMYCIN HCL IN 5 % DEXTROSE 1.5G/250ML
1250 PLASTIC BAG, INJECTION (ML) INTRAVENOUS EVERY 12 HOURS
Refills: 0 | Status: DISCONTINUED | OUTPATIENT
Start: 2025-05-04 | End: 2025-05-05

## 2025-05-04 RX ADMIN — METOPROLOL SUCCINATE 100 MILLIGRAM(S): 50 TABLET, EXTENDED RELEASE ORAL at 05:53

## 2025-05-04 RX ADMIN — Medication 81 MILLIGRAM(S): at 11:21

## 2025-05-04 RX ADMIN — ATORVASTATIN CALCIUM 40 MILLIGRAM(S): 80 TABLET, FILM COATED ORAL at 21:54

## 2025-05-04 RX ADMIN — AMPICILLIN SODIUM AND SULBACTAM SODIUM 200 GRAM(S): 1; .5 INJECTION, POWDER, FOR SOLUTION INTRAMUSCULAR; INTRAVENOUS at 05:03

## 2025-05-04 RX ADMIN — AMPICILLIN SODIUM AND SULBACTAM SODIUM 200 GRAM(S): 1; .5 INJECTION, POWDER, FOR SOLUTION INTRAMUSCULAR; INTRAVENOUS at 11:26

## 2025-05-04 RX ADMIN — Medication 1 APPLICATION(S): at 11:21

## 2025-05-04 RX ADMIN — INSULIN LISPRO 8 UNIT(S): 100 INJECTION, SOLUTION INTRAVENOUS; SUBCUTANEOUS at 09:05

## 2025-05-04 RX ADMIN — Medication 1 PACKET(S): at 09:35

## 2025-05-04 RX ADMIN — Medication 250 MILLIGRAM(S): at 05:35

## 2025-05-04 RX ADMIN — INSULIN LISPRO 8 UNIT(S): 100 INJECTION, SOLUTION INTRAVENOUS; SUBCUTANEOUS at 18:36

## 2025-05-04 RX ADMIN — ENOXAPARIN SODIUM 40 MILLIGRAM(S): 100 INJECTION SUBCUTANEOUS at 05:02

## 2025-05-04 RX ADMIN — AMPICILLIN SODIUM AND SULBACTAM SODIUM 200 GRAM(S): 1; .5 INJECTION, POWDER, FOR SOLUTION INTRAMUSCULAR; INTRAVENOUS at 17:00

## 2025-05-04 RX ADMIN — INSULIN GLARGINE-YFGN 35 UNIT(S): 100 INJECTION, SOLUTION SUBCUTANEOUS at 21:54

## 2025-05-04 RX ADMIN — INSULIN LISPRO 4: 100 INJECTION, SOLUTION INTRAVENOUS; SUBCUTANEOUS at 09:06

## 2025-05-04 RX ADMIN — Medication 166.67 MILLIGRAM(S): at 18:36

## 2025-05-04 RX ADMIN — Medication 40 MILLIEQUIVALENT(S): at 09:35

## 2025-05-04 RX ADMIN — INSULIN LISPRO 8 UNIT(S): 100 INJECTION, SOLUTION INTRAVENOUS; SUBCUTANEOUS at 12:35

## 2025-05-04 RX ADMIN — Medication 40 MILLIEQUIVALENT(S): at 14:47

## 2025-05-04 RX ADMIN — INSULIN LISPRO 4: 100 INJECTION, SOLUTION INTRAVENOUS; SUBCUTANEOUS at 21:55

## 2025-05-04 NOTE — CONSULT NOTE ADULT - SUBJECTIVE AND OBJECTIVE BOX
CARDIOLOGY ATTENDING      HISTORY OF PRESENT ILLNESS: 65 y/o male PMH hypertension, hyperlipidemia, diabetes, and CAD s/p PCI (2018). His last echo and NST in our office in early 2024 were unremarkable. He is now a/w a left forearm infection, and cardiology is called in case plastic surgery needs to perform redebridements and/or drainages and eventual delayed primary repair of the significant large wounds before discharge. His RCRI score is 2 for diabetes on insulin and CAD s/p NSTEMI. He denies angina nor dyspnea.      PAST MEDICAL & SURGICAL HISTORY:  Diabetes  HTN (hypertension)  HLD (hyperlipidemia)  CAD s/p PCI    History of appendectomy    MEDICATIONS  (STANDING):  ampicillin/sulbactam  IVPB 3 Gram(s) IV Intermittent every 6 hours  aspirin enteric coated 81 milliGRAM(s) Oral daily  atorvastatin 40 milliGRAM(s) Oral at bedtime  chlorhexidine 2% Cloths 1 Application(s) Topical daily  dextrose 5%. 1000 milliLiter(s) (50 mL/Hr) IV Continuous <Continuous>  dextrose 5%. 1000 milliLiter(s) (100 mL/Hr) IV Continuous <Continuous>  dextrose 50% Injectable 25 Gram(s) IV Push once  dextrose 50% Injectable 12.5 Gram(s) IV Push once  dextrose 50% Injectable 25 Gram(s) IV Push once  enoxaparin Injectable 40 milliGRAM(s) SubCutaneous every 24 hours  glucagon  Injectable 1 milliGRAM(s) IntraMuscular once  insulin glargine Injectable (LANTUS) 35 Unit(s) SubCutaneous at bedtime  insulin lispro (ADMELOG) corrective regimen sliding scale   SubCutaneous Before meals and at bedtime  insulin lispro Injectable (ADMELOG) 8 Unit(s) SubCutaneous three times a day before meals  metoprolol succinate  milliGRAM(s) Oral daily  vancomycin  IVPB 1000 milliGRAM(s) IV Intermittent every 12 hours      Allergies    iodinated radiocontrast agents (Hives; Swelling)    Intolerances        FAMILY HISTORY:  Family history of early CAD    Family history of high blood pressure      Non-contributary for premature coronary disease or sudden cardiac death    SOCIAL HISTORY:    [ ] Non-smoker  [ ] Smoker  [ ] Alcohol      REVIEW OF SYSTEMS:  [ ]chest pain  [  ]shortness of breath  [  ]palpitations  [  ]syncope  [ ]near syncope [ ]upper extremity weakness   [ ] lower extremity weakness  [  ]diplopia  [  ]altered mental status   [  ]fevers  [ ]chills [ ]nausea  [ ]vomitting  [  ]dysphagia    [ ]abdominal pain  [ ]melena  [ ]BRBPR    [  ]epistaxis  [  ]rash    [ ]lower extremity edema        [ ] All others negative	  [ ] Unable to obtain    PHYSICAL EXAM:  T(C): 36.8 (05-04-25 @ 13:42), Max: 37.3 (05-03-25 @ 22:00)  HR: 73 (05-04-25 @ 13:42) (71 - 94)  BP: 131/70 (05-04-25 @ 13:42) (113/51 - 132/78)  RR: 18 (05-04-25 @ 13:42) (17 - 18)  SpO2: 100% (05-04-25 @ 13:42) (96% - 100%)  Wt(kg): --    no JVD  RRR, no murmurs  CTAB  soft nt/nd  no c/c/e      TELEMETRY: 	    ECG:  	    Echo:  NST:  Cath:  	  	  LABS:	 	                          11.6   12.72 )-----------( 237      ( 04 May 2025 06:14 )             32.8     05-04    132[L]  |  100  |  21  ----------------------------<  215[H]  3.4[L]   |  19[L]  |  1.04    Ca    7.7[L]      04 May 2025 06:14  Phos  2.4     05-04  Mg     2.00     05-04    TPro  7.0  /  Alb  3.9  /  TBili  0.7  /  DBili  x   /  AST  14  /  ALT  17  /  AlkPhos  94  05-03    proBNP:   Lipid Profile:   HgA1c:   TSH:       A/P) 65 y/o male PMH hypertension, hyperlipidemia, diabetes, and CAD s/p PCI (2018). His last echo and NST in our office in early 2024 were unremarkable. He is now a/w a left forearm infection, and cardiology is called in case plastic surgery needs to perform redebridements and/or drainages and eventual delayed primary repair of the significant large wounds before discharge. His RCRI score is 2 for diabetes on insulin and CAD s/p NSTEMI. He denies angina nor dyspnea.    -continue aspirin 81mg daily for the secondary prevention of MI  -continue lipitor for goal LDL < 70  -continue toprol for hypertension, consider adding ACEi given hypertension with DM  -given his RCRI score of 2 he is high risk for intermediate risk surgery, but is otherwise optimized from a cardiac perspective  -f/u with Reuben after discharge        Kat Arevalo M.D., [facility]  Cardiac Electrophysiology  [facility]  [address] [place] [address], -[address] [place] [address]  [email or web address]    office [phone]  pager [phone]

## 2025-05-04 NOTE — PROVIDER CONTACT NOTE (CRITICAL VALUE NOTIFICATION) - TEST AND RESULT REPORTED:
Few polymorphonuclear leukocytes per low power field, few gram positive rods per oil power field, Few gram positive cocci in pairs per oil power field Abscess culture from 5/3/25: Few polymorphonuclear leukocytes per low power field, few gram positive rods per oil power field, Few gram positive cocci in pairs per oil power field

## 2025-05-04 NOTE — PROGRESS NOTE ADULT - SUBJECTIVE AND OBJECTIVE BOX
Patient is a 64y old  Male who presents with a chief complaint of Left forearm abscess (04 May 2025 09:58)  patient not known to me, assigned this AM to assume care  chart reviewed and events thus far noted  admitted overnight by hospitalist colleague     DATE OF SERVICE: 05-04-25 @ 11:09    SUBJECTIVE / OVERNIGHT EVENTS: overnight events noted    ROS:  Resp: No cough no sputum production  CVS: No chest pain no palpitations no orthopnea  GI: no N/V/D  "I feel better"    MEDICATIONS  (STANDING):  ampicillin/sulbactam  IVPB 3 Gram(s) IV Intermittent every 6 hours  aspirin enteric coated 81 milliGRAM(s) Oral daily  atorvastatin 40 milliGRAM(s) Oral at bedtime  chlorhexidine 2% Cloths 1 Application(s) Topical daily  dextrose 5%. 1000 milliLiter(s) (50 mL/Hr) IV Continuous <Continuous>  dextrose 5%. 1000 milliLiter(s) (100 mL/Hr) IV Continuous <Continuous>  dextrose 50% Injectable 25 Gram(s) IV Push once  dextrose 50% Injectable 12.5 Gram(s) IV Push once  dextrose 50% Injectable 25 Gram(s) IV Push once  enoxaparin Injectable 40 milliGRAM(s) SubCutaneous every 24 hours  glucagon  Injectable 1 milliGRAM(s) IntraMuscular once  insulin glargine Injectable (LANTUS) 20 Unit(s) SubCutaneous at bedtime  insulin lispro (ADMELOG) corrective regimen sliding scale   SubCutaneous Before meals and at bedtime  insulin lispro Injectable (ADMELOG) 8 Unit(s) SubCutaneous three times a day before meals  metoprolol succinate  milliGRAM(s) Oral daily  potassium chloride    Tablet ER 40 milliEquivalent(s) Oral every 4 hours  vancomycin  IVPB 1000 milliGRAM(s) IV Intermittent every 12 hours    MEDICATIONS  (PRN):  acetaminophen     Tablet .. 650 milliGRAM(s) Oral every 6 hours PRN Severe Pain (7 - 10)  dextrose Oral Gel 15 Gram(s) Oral once PRN Blood Glucose LESS THAN 70 milliGRAM(s)/deciliter        CAPILLARY BLOOD GLUCOSE      POCT Blood Glucose.: 208 mg/dL (04 May 2025 08:48)  POCT Blood Glucose.: 308 mg/dL (03 May 2025 22:51)  POCT Blood Glucose.: 217 mg/dL (03 May 2025 16:43)  POCT Blood Glucose.: 263 mg/dL (03 May 2025 15:37)  POCT Blood Glucose.: 178 mg/dL (03 May 2025 11:41)    I&O's Summary    03 May 2025 07:01  -  04 May 2025 07:00  --------------------------------------------------------  IN: 1250 mL / OUT: 0 mL / NET: 1250 mL    04 May 2025 07:01  -  04 May 2025 11:09  --------------------------------------------------------  IN: 300 mL / OUT: 0 mL / NET: 300 mL        Vital Signs Last 24 Hrs  T(C): 37 (04 May 2025 05:40), Max: 37.3 (03 May 2025 22:00)  T(F): 98.6 (04 May 2025 05:40), Max: 99.2 (03 May 2025 22:00)  HR: 91 (04 May 2025 05:40) (74 - 94)  BP: 132/78 (04 May 2025 05:40) (113/51 - 132/78)  BP(mean): --  RR: 17 (04 May 2025 05:40) (17 - 18)  SpO2: 100% (04 May 2025 05:40) (96% - 100%)    PHYSICAL EXAM:  GENERAL: in no apparent distress  HEAD:  Atraumatic, Normocephalic  EYES: EOMI, PERRLA, sclera clear  NECK: Supple, No JVD  CHEST/LUNG: clear b/l, no wheeze  HEART: S1 S2; no murmurs appreciated  ABDOMEN: Soft, Nontender, Bowel sounds present  EXTREMITIES:  left forearm bandaged no edema  NEUROLOGY: AO x 3 non-focal  SKIN: No rashes or lesions    LABS:                        11.6   12.72 )-----------( 237      ( 04 May 2025 06:14 )             32.8     05-04    132[L]  |  100  |  21  ----------------------------<  215[H]  3.4[L]   |  19[L]  |  1.04    Ca    7.7[L]      04 May 2025 06:14  Phos  2.4     05-04  Mg     2.00     05-04    TPro  7.0  /  Alb  3.9  /  TBili  0.7  /  DBili  x   /  AST  14  /  ALT  17  /  AlkPhos  94  05-03    PT/INR - ( 03 May 2025 01:54 )   PT: 17.8 sec;   INR: 1.50 ratio         PTT - ( 03 May 2025 01:54 )  PTT:40.8 sec      Urinalysis Basic - ( 04 May 2025 06:14 )    Color: x / Appearance: x / SG: x / pH: x  Gluc: 215 mg/dL / Ketone: x  / Bili: x / Urobili: x   Blood: x / Protein: x / Nitrite: x   Leuk Esterase: x / RBC: x / WBC x   Sq Epi: x / Non Sq Epi: x / Bacteria: x          All consultant(s) notes reviewed and care discussed with other providers        Contact Number, Dr Ramon [phone]

## 2025-05-04 NOTE — PROVIDER CONTACT NOTE (CRITICAL VALUE NOTIFICATION) - SITUATION
Few polymorphonuclear leukocytes per low power field, few gram positive rods per oil power field, Few gram positive cocci in pairs per oil power field Abscess culture from 5/3/25. Few polymorphonuclear leukocytes per low power field, few gram positive rods per oil power field, Few gram positive cocci in pairs per oil power field

## 2025-05-04 NOTE — CONSULT NOTE ADULT - SUBJECTIVE AND OBJECTIVE BOX
Patient seen and examined. Chart with pertinent labs and imaging reviewed.  Full note to follow.   S/P I+D of L forearm abscesses. Right handed  Initially injured himself on 4/29 on a dirty piece of wood in his garage  Hx DM, suboptimal control  Cx pending, gram stain w GPR  Acute onset, low suspicion that this would be actinomyces, nocardia, clostridial or fungal.  Prob S. aureus, but polymicrobial milieu expected.  Not UTD on tetanus- should be boosted  Continue unasyn  Unless stronger support emerges for MRSA infection would stop vancomycin  Local care/need for re-exploration per surgery  Thank you for the courtesy of this referral.  Matthieu Cordova MD  Attending Physician  [facility]  Division of Infectious Diseases  [phone]  ==============  [facility]  [phone]    ISAAC GARCIA  64y, Male  [id number]    HPI--      PMH/PSH--  Diabetes    HTN (hypertension)    HLD (hyperlipidemia)    DKA (diabetic ketoacidoses)    No significant past surgical history    No significant past surgical history    History of appendectomy        Allergies--  iodinated radiocontrast agents (Hives; Swelling)      Medications--  Antibiotics: ampicillin/sulbactam  IVPB 3 Gram(s) IV Intermittent every 6 hours  vancomycin  IVPB 1000 milliGRAM(s) IV Intermittent every 12 hours    Immunologic:   Other: acetaminophen     Tablet .. PRN  aspirin enteric coated  atorvastatin  chlorhexidine 2% Cloths  dextrose 5%.  dextrose 5%.  dextrose 50% Injectable  dextrose 50% Injectable  dextrose 50% Injectable  dextrose Oral Gel PRN  enoxaparin Injectable  glucagon  Injectable  insulin glargine Injectable (LANTUS)  insulin lispro (ADMELOG) corrective regimen sliding scale  insulin lispro Injectable (ADMELOG)  metoprolol succinate ER  potassium chloride    Tablet ER    Antimicrobials last 90 days per EMR: MEDICATIONS  (STANDING):  ampicillin/sulbactam  IVPB   200 mL/Hr IV Intermittent (05-03-25 @ 11:29)    ampicillin/sulbactam  IVPB   200 mL/Hr IV Intermittent (05-04-25 @ 05:03)   200 mL/Hr IV Intermittent (05-03-25 @ 23:03)   200 mL/Hr IV Intermittent (05-03-25 @ 17:49)    cefTRIAXone   IVPB   100 mL/Hr IV Intermittent (05-03-25 @ 01:50)    clindamycin IVPB   100 mL/Hr IV Intermittent (05-03-25 @ 02:53)    vancomycin  IVPB   250 mL/Hr IV Intermittent (05-04-25 @ 05:35)   250 mL/Hr IV Intermittent (05-03-25 @ 17:35)    vancomycin  IVPB.   250 mL/Hr IV Intermittent (05-03-25 @ 12:41)        Social History--  EtOH: denies   Tobacco: denies   Drug Use: denies     Family/Marital History--    Family history of early CAD    Family history of high blood pressure      Travel/Environmental/Occupational History:  [employer]      Review of Systems:  A >=10-point review of systems was obtained.     Pertinent positives and negatives--  Constitutional: No fevers. No Chills. No Rigors.   Eyes:  ENMT:  Cardiovascular: No chest pain. No palpitations.  Respiratory: No shortness of breath. No cough.  Gastrointestinal: No nausea or vomiting. No diarrhea or constipation.   Genitourinary:  Musculoskeletal:  Skin:  Neurologic:  Psychiatric: Pleasant. Appropriate affect.  Endocrine:  Heme/Lymphatic:  Allergy/Immunologic:    Review of systems otherwise negative except as previously noted.    Physical Exam--  Vital Signs: T(F): 98.6 (05-04-25 @ 05:40), Max: 99.2 (05-03-25 @ 22:00)  HR: 91 (05-04-25 @ 05:40)  BP: 132/78 (05-04-25 @ 05:40)  RR: 17 (05-04-25 @ 05:40)  SpO2: 100% (05-04-25 @ 05:40)  Wt(kg): --  General: Nontoxic-appearing Male in no acute distress.  HEENT: AT/NC. PERRL. EOMI. Anicteric. Conjunctiva pink and moist. Oropharynx clear. Dentition fair.  Neck: Not rigid. No sense of mass.  Nodes: None palpable.  Lungs: Clear bilaterally without rales, wheezing or rhonchi  Heart: Regular rate and rhythm. No Murmur. No rub. No gallop. No palpable thrill.  Abdomen: Bowel sounds present and normoactive. Soft. Nondistended. Nontender. No sense of mass. No organomegaly.  Back: No spinal tenderness. No costovertebral angle tenderness.   Extremities: No cyanosis or clubbing. No edema.   Skin: Warm. Dry. Good turgor. No rash. No vasculitic stigmata.  Psychiatric: Appropriate affect and mood for situation.         Laboratory & Imaging Data--  CBC                        11.6   12.72 )-----------( 237      ( 04 May 2025 06:14 )             32.8       Chemistries  05-04    132[L]  |  100  |  21  ----------------------------<  215[H]  3.4[L]   |  19[L]  |  1.04    Ca    7.7[L]      04 May 2025 06:14  Phos  2.4     05-04  Mg     2.00     05-04    TPro  7.0  /  Alb  3.9  /  TBili  0.7  /  DBili  x   /  AST  14  /  ALT  17  /  AlkPhos  94  05-03      Culture Data    Culture - Abscess with Gram Stain (collected 03 May 2025 11:29)  Source: Abscess forearm left  Gram Stain (03 May 2025 23:33):    Few polymorphonuclear leukocytes per low power field    Few Gram Positive Rods per oil power field    Few Gram positive cocci in pairs per oil power field    Culture - Blood (collected 03 May 2025 02:00)  Source: Blood Blood-Peripheral  Preliminary Report (04 May 2025 09:01):    No growth at 24 hours    Culture - Blood (collected 03 May 2025 01:50)  Source: Blood Blood-Peripheral  Preliminary Report (04 May 2025 09:01):    No growth at 24 hours           Patient seen and examined. Chart with pertinent labs and imaging reviewed.  Full note to follow.   S/P I+D of L forearm abscesses. Right handed  Initially injured himself on 4/29 on a dirty piece of wood in his garage  Hx DM, suboptimal control  Cx pending, gram stain w GPR  Acute onset, low suspicion that this would be actinomyces, nocardia, clostridial or fungal.  Prob S. aureus, but polymicrobial milieu expected.  Not UTD on tetanus- should be boosted  Continue unasyn  Unless stronger support emerges for MRSA infection would stop vancomycin  Local care/need for re-exploration per surgery  Thank you for the courtesy of this referral.  Matthieu Cordova MD  Attending Physician  [facility]  Division of Infectious Diseases  [phone]  ==============  [facility]  Division of Infectious Diseases  [phone]    ISAAC GARCIA  64y, Male  [id number]    HPI--  This is a 65-year-old man with a past medical history significant for suboptimally controlled diabetes mellitus, CAD s/p stent, AMI, dyslipidemia, high blood pressure, prior appendectomy, who injured his left forearm on a dirty piece of wood on April 29.  He was removing debris from his garage after having his basement renovated.  He removed the piece of wood, but there is an "hole" there.  He cleaned it as best he could and then applied a topical antibiotic that his daughter had been prescribed.  Subsequent he developed redness swelling and purulent drainage.  There was also fevers and chills.  His exam worsened and he presented to the emergency room.  He is now status post incision and drainage by plastics/hand surgery.  Potential repeat exploration is planned.  He is feeling somewhat better.  Pain control is adequate.  Gram stain of the purulent drainage has gram-positive rods.  Patient has been fully vaccinated versus tetanus, but he is due for a booster.    PMH/PSH--  Diabetes    HTN (hypertension)    HLD (hyperlipidemia)    DKA (diabetic ketoacidoses)    No significant past surgical history    No significant past surgical history    History of appendectomy        Allergies--  iodinated radiocontrast agents (Hives; Swelling)      Medications--  Antibiotics: ampicillin/sulbactam  IVPB 3 Gram(s) IV Intermittent every 6 hours  vancomycin  IVPB 1000 milliGRAM(s) IV Intermittent every 12 hours    Immunologic:   Other: acetaminophen     Tablet .. PRN  aspirin enteric coated  atorvastatin  chlorhexidine 2% Cloths  dextrose 5%.  dextrose 5%.  dextrose 50% Injectable  dextrose 50% Injectable  dextrose 50% Injectable  dextrose Oral Gel PRN  enoxaparin Injectable  glucagon  Injectable  insulin glargine Injectable (LANTUS)  insulin lispro (ADMELOG) corrective regimen sliding scale  insulin lispro Injectable (ADMELOG)  metoprolol succinate ER  potassium chloride    Tablet ER    Antimicrobials last 90 days per EMR: MEDICATIONS  (STANDING):  ampicillin/sulbactam  IVPB   200 mL/Hr IV Intermittent (05-03-25 @ 11:29)    ampicillin/sulbactam  IVPB   200 mL/Hr IV Intermittent (05-04-25 @ 05:03)   200 mL/Hr IV Intermittent (05-03-25 @ 23:03)   200 mL/Hr IV Intermittent (05-03-25 @ 17:49)    cefTRIAXone   IVPB   100 mL/Hr IV Intermittent (05-03-25 @ 01:50)    clindamycin IVPB   100 mL/Hr IV Intermittent (05-03-25 @ 02:53)    vancomycin  IVPB   250 mL/Hr IV Intermittent (05-04-25 @ 05:35)   250 mL/Hr IV Intermittent (05-03-25 @ 17:35)    vancomycin  IVPB.   250 mL/Hr IV Intermittent (05-03-25 @ 12:41)        Social History--  EtOH: denies   Tobacco: denies   Drug Use: denies     Family/Marital History--    Family history of early CAD    Family history of high blood pressure      Travel/Environmental/Occupational History:  [employer]      Review of Systems:  A >=10-point review of systems was obtained.   Review of systems otherwise negative except as previously noted.    Physical Exam--  Vital Signs: T(F): 98.6 (05-04-25 @ 05:40), Max: 99.2 (05-03-25 @ 22:00)  HR: 91 (05-04-25 @ 05:40)  BP: 132/78 (05-04-25 @ 05:40)  RR: 17 (05-04-25 @ 05:40)  SpO2: 100% (05-04-25 @ 05:40)  Wt(kg): --  General: Nontoxic-appearing Male in no acute distress.  HEENT: AT/NC. Anicteric. Conjunctiva pink and moist. Oropharynx clear.   Neck: Not rigid. No sense of mass.  Nodes: None palpable.  Lungs: Clear bilaterally   Heart: Regular rate and rhythm.  Abdomen: Bowel sounds present and normoactive. Soft. Nondistended. Nontender.   Extremities: No cyanosis or clubbing. LUE dressed.  Skin: Warm. Dry. Good turgor. No rash. No vasculitic stigmata.  Psychiatric: Appropriate affect and mood for situation.         Laboratory & Imaging Data--  CBC                        11.6   12.72 )-----------( 237      ( 04 May 2025 06:14 )             32.8       Chemistries  05-04    132[L]  |  100  |  21  ----------------------------<  215[H]  3.4[L]   |  19[L]  |  1.04    Ca    7.7[L]      04 May 2025 06:14  Phos  2.4     05-04  Mg     2.00     05-04    TPro  7.0  /  Alb  3.9  /  TBili  0.7  /  DBili  x   /  AST  14  /  ALT  17  /  AlkPhos  94  05-03      Culture Data    Culture - Abscess with Gram Stain (collected 03 May 2025 11:29)  Source: Abscess forearm left  Gram Stain (03 May 2025 23:33):    Few polymorphonuclear leukocytes per low power field    Few Gram Positive Rods per oil power field    Few Gram positive cocci in pairs per oil power field    Culture - Blood (collected 03 May 2025 02:00)  Source: Blood Blood-Peripheral  Preliminary Report (04 May 2025 09:01):    No growth at 24 hours    Culture - Blood (collected 03 May 2025 01:50)  Source: Blood Blood-Peripheral  Preliminary Report (04 May 2025 09:01):    No growth at 24 hours    ACC: [id number] EXAM:  XR WRIST COMP MIN 3 VIEWS LT   ORDERED BY: NA ARRIOLA     ACC: [id number] EXAM:  XR FOREARM 2 VIEWS LT   ORDERED BY: NA ARRIOLA     PROCEDURE DATE:  05/03/2025          INTERPRETATION:  CLINICAL INDICATION: On injury and wounds. Evaluate for   abscess, osteomyelitis, deep space infection  TECHNIQUE: 2 views left forearm, 3 views of the left wrist.    COMPARISON: None available.    FINDINGS:    Soft tissue swelling overlies the forearm and at the medial elbow with an   apparent soft tissue defect, ulcer or blister along the lateral aspect of   the distal forearm (a rounded focal 6 mm lucency is present). No tracking   soft tissue gas.    No acute fracture or radiographic evidence for osteomyelitis.    IMPRESSION:  Softtissue edema with with focal rounded lucency in the soft tissues at   the lateral distal forearm, may represent an ulcer, blister, versus   other. MRI may be helpful for further evaluation workup.    No evidence of osteomyelitis.

## 2025-05-04 NOTE — PROGRESS NOTE ADULT - SUBJECTIVE AND OBJECTIVE BOX
Appreciate all efforts.  Still c/o pain and swelling, however, moderately improved.  VSS  Alert and oriented.  LUE wounds unpacked, still moderately swollen and with some erythema, however, left forearm to anticubital areas much softer, .  Options/alts discussed and he consented.  Under adequate lighting and sterile conditions, all areas of infected left hand/wrist/forearm deep abscess cavities redrained and copiously irrigated with small-moderate amounts of recurrent drainage obtained.  Tolerated well. N&V intact and improved ROM.  Minimal bleeding.  All wounds cleansed and redressed sterilly.  Reassured.  Plan: Awaiting ID consult.          Continue IV abx          Elevation          Begin local wound care (ordered by me)          I will follow closely for possible redebridements and/or drainages and eventual delayed primary repair of the significant large wounds before discharge.  Cell [phone].

## 2025-05-04 NOTE — CONSULT NOTE ADULT - ASSESSMENT
S/P I+D of L forearm abscesses. Right handed  Initially injured himself on 4/29 on a dirty piece of wood in his garage  Hx DM, suboptimal control  Cx pending, gram stain w GPR  Acute onset, low suspicion that this would be actinomyces, nocardia, clostridial or fungal.  Prob S. aureus, but polymicrobial milieu expected.  Not UTD on tetanus- should be boosted  Continue unasyn  Unless stronger support emerges for MRSA infection would stop vancomycin  Local care/need for re-exploration per surgery  Thank you for the courtesy of this referral.  Matthieu Cordova MD  Attending Physician  [facility]  Division of Infectious Diseases  [phone]

## 2025-05-05 DIAGNOSIS — E11.65 TYPE 2 DIABETES MELLITUS WITH HYPERGLYCEMIA: ICD-10-CM

## 2025-05-05 DIAGNOSIS — L03.119 CELLULITIS OF UNSPECIFIED PART OF LIMB: ICD-10-CM

## 2025-05-05 LAB
ANION GAP SERPL CALC-SCNC: 12 MMOL/L — SIGNIFICANT CHANGE UP (ref 7–14)
BUN SERPL-MCNC: 19 MG/DL — SIGNIFICANT CHANGE UP (ref 7–23)
CALCIUM SERPL-MCNC: 8.2 MG/DL — LOW (ref 8.4–10.5)
CHLORIDE SERPL-SCNC: 106 MMOL/L — SIGNIFICANT CHANGE UP (ref 98–107)
CO2 SERPL-SCNC: 20 MMOL/L — LOW (ref 22–31)
CREAT SERPL-MCNC: 0.95 MG/DL — SIGNIFICANT CHANGE UP (ref 0.5–1.3)
EGFR: 90 ML/MIN/1.73M2 — SIGNIFICANT CHANGE UP
EGFR: 90 ML/MIN/1.73M2 — SIGNIFICANT CHANGE UP
GLUCOSE BLDC GLUCOMTR-MCNC: 117 MG/DL — HIGH (ref 70–99)
GLUCOSE BLDC GLUCOMTR-MCNC: 135 MG/DL — HIGH (ref 70–99)
GLUCOSE BLDC GLUCOMTR-MCNC: 179 MG/DL — HIGH (ref 70–99)
GLUCOSE BLDC GLUCOMTR-MCNC: 90 MG/DL — SIGNIFICANT CHANGE UP (ref 70–99)
GLUCOSE SERPL-MCNC: 156 MG/DL — HIGH (ref 70–99)
HCT VFR BLD CALC: 33 % — LOW (ref 39–50)
HGB BLD-MCNC: 11.4 G/DL — LOW (ref 13–17)
MAGNESIUM SERPL-MCNC: 2.3 MG/DL — SIGNIFICANT CHANGE UP (ref 1.6–2.6)
MCHC RBC-ENTMCNC: 28.4 PG — SIGNIFICANT CHANGE UP (ref 27–34)
MCHC RBC-ENTMCNC: 34.5 G/DL — SIGNIFICANT CHANGE UP (ref 32–36)
MCV RBC AUTO: 82.3 FL — SIGNIFICANT CHANGE UP (ref 80–100)
NRBC # BLD AUTO: 0 K/UL — SIGNIFICANT CHANGE UP (ref 0–0)
NRBC # FLD: 0 K/UL — SIGNIFICANT CHANGE UP (ref 0–0)
NRBC BLD AUTO-RTO: 0 /100 WBCS — SIGNIFICANT CHANGE UP (ref 0–0)
PHOSPHATE SERPL-MCNC: 3.2 MG/DL — SIGNIFICANT CHANGE UP (ref 2.5–4.5)
PLATELET # BLD AUTO: 282 K/UL — SIGNIFICANT CHANGE UP (ref 150–400)
POTASSIUM SERPL-MCNC: 4 MMOL/L — SIGNIFICANT CHANGE UP (ref 3.5–5.3)
POTASSIUM SERPL-SCNC: 4 MMOL/L — SIGNIFICANT CHANGE UP (ref 3.5–5.3)
RBC # BLD: 4.01 M/UL — LOW (ref 4.2–5.8)
RBC # FLD: 12.7 % — SIGNIFICANT CHANGE UP (ref 10.3–14.5)
SODIUM SERPL-SCNC: 138 MMOL/L — SIGNIFICANT CHANGE UP (ref 135–145)
WBC # BLD: 9.82 K/UL — SIGNIFICANT CHANGE UP (ref 3.8–10.5)
WBC # FLD AUTO: 9.82 K/UL — SIGNIFICANT CHANGE UP (ref 3.8–10.5)

## 2025-05-05 PROCEDURE — 99232 SBSQ HOSP IP/OBS MODERATE 35: CPT

## 2025-05-05 RX ORDER — CLOSTRIDIUM TETANI TOXOID ANTIGEN (FORMALDEHYDE INACTIVATED), CORYNEBACTERIUM DIPHTHERIAE TOXOID ANTIGEN (FORMALDEHYDE INACTIVATED), BORDETELLA PERTUSSIS TOXOID ANTIGEN (GLUTARALDEHYDE INACTIVATED), BORDETELLA PERTUSSIS FILAMENTOUS HEMAGGLUTININ ANTIGEN (FORMALDEHYDE INACTIVATED), BORDETELLA PERTUSSIS PERTACTIN ANTIGEN, AND BORDETELLA PERTUSSIS FIMBRIAE 2/3 ANTIGEN 5; 2; 2.5; 5; 3; 5 [LF]/.5ML; [LF]/.5ML; UG/.5ML; UG/.5ML; UG/.5ML; UG/.5ML
0.5 INJECTION, SUSPENSION INTRAMUSCULAR ONCE
Refills: 0 | Status: COMPLETED | OUTPATIENT
Start: 2025-05-05 | End: 2025-05-06

## 2025-05-05 RX ORDER — INSULIN GLARGINE-YFGN 100 [IU]/ML
25 INJECTION, SOLUTION SUBCUTANEOUS AT BEDTIME
Refills: 0 | Status: DISCONTINUED | OUTPATIENT
Start: 2025-05-05 | End: 2025-05-06

## 2025-05-05 RX ADMIN — ENOXAPARIN SODIUM 40 MILLIGRAM(S): 100 INJECTION SUBCUTANEOUS at 05:11

## 2025-05-05 RX ADMIN — ATORVASTATIN CALCIUM 40 MILLIGRAM(S): 80 TABLET, FILM COATED ORAL at 21:56

## 2025-05-05 RX ADMIN — INSULIN LISPRO 2: 100 INJECTION, SOLUTION INTRAVENOUS; SUBCUTANEOUS at 08:51

## 2025-05-05 RX ADMIN — AMPICILLIN SODIUM AND SULBACTAM SODIUM 200 GRAM(S): 1; .5 INJECTION, POWDER, FOR SOLUTION INTRAMUSCULAR; INTRAVENOUS at 17:01

## 2025-05-05 RX ADMIN — AMPICILLIN SODIUM AND SULBACTAM SODIUM 200 GRAM(S): 1; .5 INJECTION, POWDER, FOR SOLUTION INTRAMUSCULAR; INTRAVENOUS at 12:02

## 2025-05-05 RX ADMIN — INSULIN GLARGINE-YFGN 25 UNIT(S): 100 INJECTION, SOLUTION SUBCUTANEOUS at 21:56

## 2025-05-05 RX ADMIN — Medication 1 APPLICATION(S): at 12:02

## 2025-05-05 RX ADMIN — METOPROLOL SUCCINATE 100 MILLIGRAM(S): 50 TABLET, EXTENDED RELEASE ORAL at 05:11

## 2025-05-05 RX ADMIN — Medication 166.67 MILLIGRAM(S): at 06:41

## 2025-05-05 RX ADMIN — INSULIN LISPRO 8 UNIT(S): 100 INJECTION, SOLUTION INTRAVENOUS; SUBCUTANEOUS at 18:10

## 2025-05-05 RX ADMIN — AMPICILLIN SODIUM AND SULBACTAM SODIUM 200 GRAM(S): 1; .5 INJECTION, POWDER, FOR SOLUTION INTRAMUSCULAR; INTRAVENOUS at 00:02

## 2025-05-05 RX ADMIN — INSULIN LISPRO 8 UNIT(S): 100 INJECTION, SOLUTION INTRAVENOUS; SUBCUTANEOUS at 08:51

## 2025-05-05 RX ADMIN — Medication 81 MILLIGRAM(S): at 12:02

## 2025-05-05 RX ADMIN — AMPICILLIN SODIUM AND SULBACTAM SODIUM 200 GRAM(S): 1; .5 INJECTION, POWDER, FOR SOLUTION INTRAMUSCULAR; INTRAVENOUS at 05:11

## 2025-05-05 RX ADMIN — AMPICILLIN SODIUM AND SULBACTAM SODIUM 200 GRAM(S): 1; .5 INJECTION, POWDER, FOR SOLUTION INTRAMUSCULAR; INTRAVENOUS at 23:26

## 2025-05-05 RX ADMIN — INSULIN LISPRO 8 UNIT(S): 100 INJECTION, SOLUTION INTRAVENOUS; SUBCUTANEOUS at 12:44

## 2025-05-05 NOTE — PROGRESS NOTE ADULT - NS ATTEND AMEND GEN_ALL_CORE FT
Patient seen and examined. Agree with plan as detailed in PA/NP Note.     -continue toprol for hypertension, consider adding ARB given hypertension with DM    Nichelle Ross MD  Pager: [phone]  Office: [phone]

## 2025-05-05 NOTE — PROGRESS NOTE ADULT - ASSESSMENT
S/P I+D of L forearm abscesses. Right handed  Initially injured himself on 4/29 on a dirty piece of wood in his garage  Hx DM, suboptimal control  Cx pending, gram stain w GPR  Acute onset, low suspicion that this would be actinomyces, nocardia, clostridial or fungal.  Prob S. aureus, but polymicrobial milieu expected.  Not UTD on tetanus- should be boosted    05/05: No sensi on S. aureus, no MRSA RF elicited and MRSA PCR negative. Recognizing that drainiage is the crucial element of therapy here, I'm comfortable not resuming vancomycin here. Arcanobacterium is better known as a cause of pharyngitis (like group A Strep), but can be a cause of skin/soft tissue infections. DM is a known RF for such. Isolates are usuallu PCN sensiitive, but can display tolerance due to relatively low intracellular levels of beta lactams. Azithro has good activity. Again drainage is the crucial element.    Suggestions--  Continue unasyn  For now, defer additional vanco  Ongoing surgical follow up  If progress plateaus would add azithromycin 500mg PO daily  Tetanus booster    Matthieu Cordova MD  Attending Physician  [facility]  Division of Infectious Diseases  [phone]

## 2025-05-05 NOTE — PROGRESS NOTE ADULT - SUBJECTIVE AND OBJECTIVE BOX
Date of service 5/5/25    no pain or SOB, ROS otherwise negative    MEDS  acetaminophen     Tablet .. 650 milliGRAM(s) Oral every 6 hours PRN  ampicillin/sulbactam  IVPB 3 Gram(s) IV Intermittent every 6 hours  aspirin enteric coated 81 milliGRAM(s) Oral daily  atorvastatin 40 milliGRAM(s) Oral at bedtime  chlorhexidine 2% Cloths 1 Application(s) Topical daily  enoxaparin Injectable 40 milliGRAM(s) SubCutaneous every 24 hours  glucagon  Injectable 1 milliGRAM(s) IntraMuscular once  insulin glargine Injectable (LANTUS) 25 Unit(s) SubCutaneous at bedtime  insulin lispro (ADMELOG) corrective regimen sliding scale   SubCutaneous Before meals and at bedtime  insulin lispro Injectable (ADMELOG) 8 Unit(s) SubCutaneous three times a day before meals  metoprolol succinate  milliGRAM(s) Oral daily                          11.4   9.82  )-----------( 282      ( 05 May 2025 05:52 )             33.0     138  |  106  |  19  ----------------------------<  156[H]  4.0   |  20[L]  |  0.95    Ca    8.2[L]      05 May 2025 05:52  Phos  3.2     05-05  Mg     2.30     05-05    T(C): 36.9 (05-05-25 @ 13:16), Max: 36.9 (05-05-25 @ 13:16)  HR: 68 (05-05-25 @ 13:16) (68 - 75)  BP: 137/65 (05-05-25 @ 13:16) (131/70 - 155/78)  RR: 18 (05-05-25 @ 13:16) (17 - 18)  SpO2: 97% (05-05-25 @ 13:16) (96% - 100%)  Wt(kg): --    I&O's Summary    04 May 2025 07:01  -  05 May 2025 07:00  --------------------------------------------------------  IN: 2456.7 mL / OUT: 2200 mL / NET: 256.7 mL    05 May 2025 07:01  -  05 May 2025 13:38  --------------------------------------------------------  IN: 800 mL / OUT: 400 mL / NET: 400 mL      General: Well nourished in no acute distress. Alert and Oriented * 3.   Head: Normocephalic and atraumatic.   Neck: No JVD. No bruits. Supple. Does not appear to be enlarged.   Cardiovascular: + S1,S2 ; RRR Soft systolic murmur at the left lower sternal border. No rubs noted.    Lungs: CTA b/l. No rhonchi, rales or wheezes.   Abdomen: + BS, soft. Non tender. Non distended. No rebound. No guarding.   Extremities: No clubbing/cyanosis/edema.   Neurologic: Moves all four extremities. Full range of motion.   Skin: Warm and moist. The patient's skin has normal elasticity and good skin turgor.   Psychiatric: Appropriate mood and affect.  Musculoskeletal: Normal range of motion, normal strength    DATA    A/P) 65 y/o male PMH hypertension, hyperlipidemia, diabetes, and CAD s/p PCI (2018). His last echo and NST in our office in early 2024 were unremarkable. He is now a/w a left forearm infection, and cardiology is called in case plastic surgery needs to perform redebridements and/or drainages and eventual delayed primary repair of the significant large wounds before discharge. His RCRI score is 2 for diabetes on insulin and CAD s/p NSTEMI. He denies angina nor dyspnea.    -continue aspirin 81mg daily for the secondary prevention of MI  -continue lipitor for goal LDL < 70  -continue toprol for hypertension, consider adding ACEi given hypertension with DM  -given his RCRI score of 2 he is high risk for intermediate risk surgery, but is otherwise optimized from a cardiac perspective  -f/u with Dr Alexis after discharge [phone]    Audra BERRY  [phone]        Date of service 5/5/25    no pain or SOB, ROS otherwise negative    MEDS  acetaminophen     Tablet .. 650 milliGRAM(s) Oral every 6 hours PRN  ampicillin/sulbactam  IVPB 3 Gram(s) IV Intermittent every 6 hours  aspirin enteric coated 81 milliGRAM(s) Oral daily  atorvastatin 40 milliGRAM(s) Oral at bedtime  chlorhexidine 2% Cloths 1 Application(s) Topical daily  enoxaparin Injectable 40 milliGRAM(s) SubCutaneous every 24 hours  glucagon  Injectable 1 milliGRAM(s) IntraMuscular once  insulin glargine Injectable (LANTUS) 25 Unit(s) SubCutaneous at bedtime  insulin lispro (ADMELOG) corrective regimen sliding scale   SubCutaneous Before meals and at bedtime  insulin lispro Injectable (ADMELOG) 8 Unit(s) SubCutaneous three times a day before meals  metoprolol succinate  milliGRAM(s) Oral daily                          11.4   9.82  )-----------( 282      ( 05 May 2025 05:52 )             33.0     138  |  106  |  19  ----------------------------<  156[H]  4.0   |  20[L]  |  0.95    Ca    8.2[L]      05 May 2025 05:52  Phos  3.2     05-05  Mg     2.30     05-05    T(C): 36.9 (05-05-25 @ 13:16), Max: 36.9 (05-05-25 @ 13:16)  HR: 68 (05-05-25 @ 13:16) (68 - 75)  BP: 137/65 (05-05-25 @ 13:16) (131/70 - 155/78)  RR: 18 (05-05-25 @ 13:16) (17 - 18)  SpO2: 97% (05-05-25 @ 13:16) (96% - 100%)  Wt(kg): --    I&O's Summary    04 May 2025 07:01  -  05 May 2025 07:00  --------------------------------------------------------  IN: 2456.7 mL / OUT: 2200 mL / NET: 256.7 mL    05 May 2025 07:01  -  05 May 2025 13:38  --------------------------------------------------------  IN: 800 mL / OUT: 400 mL / NET: 400 mL      General: Well nourished in no acute distress. Alert and Oriented * 3.   Head: Normocephalic and atraumatic.   Neck: No JVD. No bruits. Supple. Does not appear to be enlarged.   Cardiovascular: + S1,S2 ; RRR Soft systolic murmur at the left lower sternal border. No rubs noted.    Lungs: CTA b/l. No rhonchi, rales or wheezes.   Abdomen: + BS, soft. Non tender. Non distended. No rebound. No guarding.   Extremities: No clubbing/cyanosis/edema.   Neurologic: Moves all four extremities. Full range of motion.   Skin: Warm and moist. The patient's skin has normal elasticity and good skin turgor.   Psychiatric: Appropriate mood and affect.  Musculoskeletal: Normal range of motion, normal strength    DATA    A/P) 65 y/o male PMH hypertension, hyperlipidemia, diabetes, and CAD s/p PCI (2018). His last echo and NST in our office in early 2024 were unremarkable. He is now a/w a left forearm infection, and cardiology is called in case plastic surgery needs to perform redebridements and/or drainages and eventual delayed primary repair of the significant large wounds before discharge. His RCRI score is 2 for diabetes on insulin and CAD s/p NSTEMI. He denies angina nor dyspnea.    -continue aspirin 81mg daily for the secondary prevention of MI  -continue lipitor for goal LDL < 70  -continue toprol for hypertension, consider adding ARB given hypertension with DM  -given his RCRI score of 2 he is high risk for intermediate risk surgery, but is otherwise optimized from a cardiac perspective  -f/u with Dr Alexis after discharge [phone]    Audra BERRY  [phone]

## 2025-05-05 NOTE — PROGRESS NOTE ADULT - SUBJECTIVE AND OBJECTIVE BOX
Patient is a 64y old  Male who presents with a chief complaint of Left forearm abscess (05 May 2025 08:29)      DATE OF SERVICE: 05-05-25 @ 13:03    SUBJECTIVE / OVERNIGHT EVENTS: overnight events noted    ROS:  Resp: No cough no sputum production  CVS: No chest pain no palpitations no orthopnea  GI: no N/V/D  "I feel better"     MEDICATIONS  (STANDING):  ampicillin/sulbactam  IVPB 3 Gram(s) IV Intermittent every 6 hours  aspirin enteric coated 81 milliGRAM(s) Oral daily  atorvastatin 40 milliGRAM(s) Oral at bedtime  chlorhexidine 2% Cloths 1 Application(s) Topical daily  dextrose 5%. 1000 milliLiter(s) (50 mL/Hr) IV Continuous <Continuous>  dextrose 5%. 1000 milliLiter(s) (100 mL/Hr) IV Continuous <Continuous>  dextrose 50% Injectable 25 Gram(s) IV Push once  dextrose 50% Injectable 12.5 Gram(s) IV Push once  dextrose 50% Injectable 25 Gram(s) IV Push once  enoxaparin Injectable 40 milliGRAM(s) SubCutaneous every 24 hours  glucagon  Injectable 1 milliGRAM(s) IntraMuscular once  insulin glargine Injectable (LANTUS) 35 Unit(s) SubCutaneous at bedtime  insulin lispro (ADMELOG) corrective regimen sliding scale   SubCutaneous Before meals and at bedtime  insulin lispro Injectable (ADMELOG) 8 Unit(s) SubCutaneous three times a day before meals  metoprolol succinate  milliGRAM(s) Oral daily    MEDICATIONS  (PRN):  acetaminophen     Tablet .. 650 milliGRAM(s) Oral every 6 hours PRN Severe Pain (7 - 10)  dextrose Oral Gel 15 Gram(s) Oral once PRN Blood Glucose LESS THAN 70 milliGRAM(s)/deciliter        CAPILLARY BLOOD GLUCOSE      POCT Blood Glucose.: 90 mg/dL (05 May 2025 12:40)  POCT Blood Glucose.: 179 mg/dL (05 May 2025 08:45)  POCT Blood Glucose.: 230 mg/dL (04 May 2025 21:44)  POCT Blood Glucose.: 128 mg/dL (04 May 2025 17:56)    I&O's Summary    04 May 2025 07:01  -  05 May 2025 07:00  --------------------------------------------------------  IN: 2456.7 mL / OUT: 2200 mL / NET: 256.7 mL    05 May 2025 07:01  -  05 May 2025 13:03  --------------------------------------------------------  IN: 400 mL / OUT: 100 mL / NET: 300 mL        Vital Signs Last 24 Hrs  T(C): 36.4 (05 May 2025 09:16), Max: 36.8 (04 May 2025 13:42)  T(F): 97.5 (05 May 2025 09:16), Max: 98.2 (04 May 2025 13:42)  HR: 71 (05 May 2025 09:16) (68 - 75)  BP: 148/73 (05 May 2025 09:16) (131/70 - 155/78)  BP(mean): --  RR: 18 (05 May 2025 09:16) (17 - 18)  SpO2: 96% (05 May 2025 09:16) (96% - 100%)    PHYSICAL EXAM:  CHEST/LUNG: clear  HEART: S1 S2; no murmurs   ABDOMEN: Soft, Nontender  EXTREMITIES:  left forearm bandaged no edema  NEUROLOGY: AO x 3 non-focal  SKIN: No rashes or lesions    LABS:                        11.4   9.82  )-----------( 282      ( 05 May 2025 05:52 )             33.0     05-05    138  |  106  |  19  ----------------------------<  156[H]  4.0   |  20[L]  |  0.95    Ca    8.2[L]      05 May 2025 05:52  Phos  3.2     05-05  Mg     2.30     05-05            Urinalysis Basic - ( 05 May 2025 05:52 )    Color: x / Appearance: x / SG: x / pH: x  Gluc: 156 mg/dL / Ketone: x  / Bili: x / Urobili: x   Blood: x / Protein: x / Nitrite: x   Leuk Esterase: x / RBC: x / WBC x   Sq Epi: x / Non Sq Epi: x / Bacteria: x          All consultant(s) notes reviewed and care discussed with other providers        Contact Number, Dr Ramon [phone]

## 2025-05-05 NOTE — PROGRESS NOTE ADULT - SUBJECTIVE AND OBJECTIVE BOX
Appreciate all efforts.  Pain improved.  Still c/o swelling.  VSS  Alert and oriented.  Options/alts discussed and he consented.  Under sterile conditions, all 3 wounds were reexplored and deeply drained and with minimal residual serous and serosang drainage obtained and with excellent hemostasis. Wounds copiously irrigated and redressed sterilly.  Reassured.  Continue IV abx as per ID team/Local wound care.  Will consider further debridement and secondary repair of the wounds within the next 1-2 days and prior to discharging home.  Pt is very pleased with the progress.

## 2025-05-05 NOTE — PROGRESS NOTE ADULT - SUBJECTIVE AND OBJECTIVE BOX
[facility]  Division of Infectious Diseases  [phone]    Name: ISAAC GARCIA  Age: 64y  Gender: Male  MRN: [id number]    Interval History--  Notes reviewed. Seen earlier today. Surgical follow up appreciated.   Cx initially growing solely Arcanobacterium haemolyticum, vancomycin discontinued  Subsequently culture also with S. aureus and GAS.     Past Medical History--  Diabetes    HTN (hypertension)    HLD (hyperlipidemia)    DKA (diabetic ketoacidoses)    No significant past surgical history    No significant past surgical history    History of appendectomy        For details regarding the patient's social history, family history, and other miscellaneous elements, please refer the initial infectious diseases consultation and/or the admitting history and physical examination for this admission.    Allergies    iodinated radiocontrast agents (Hives; Swelling)    Intolerances        Medications--  Antibiotics:  ampicillin/sulbactam  IVPB 3 Gram(s) IV Intermittent every 6 hours    Immunologic:    Other:  acetaminophen     Tablet .. PRN  aspirin enteric coated  atorvastatin  chlorhexidine 2% Cloths  dextrose 5%.  dextrose 5%.  dextrose 50% Injectable  dextrose 50% Injectable  dextrose 50% Injectable  dextrose Oral Gel PRN  enoxaparin Injectable  glucagon  Injectable  insulin glargine Injectable (LANTUS)  insulin lispro (ADMELOG) corrective regimen sliding scale  insulin lispro Injectable (ADMELOG)  metoprolol succinate ER      Review of Systems--  A 10-point review of systems was obtained.   Review of systems otherwise negative except as previously noted.    Physical Examination--  Vital Signs: T(F): 98.4 (05-05-25 @ 13:16), Max: 98.4 (05-05-25 @ 13:16)  HR: 68 (05-05-25 @ 13:16)  BP: 137/65 (05-05-25 @ 13:16)  RR: 18 (05-05-25 @ 13:16)  SpO2: 97% (05-05-25 @ 13:16)  Wt(kg): --  General: Nontoxic-appearing Male in no acute distress.  HEENT: AT/NC. Anicteric. Conjunctiva pink and moist. Oropharynx clear.   Neck: Not rigid. No sense of mass.  Nodes: None palpable.  Lungs: Clear bilaterally   Heart: Regular rate and rhythm.  Abdomen: Bowel sounds present and normoactive. Soft. Nondistended. Nontender.   Extremities: No cyanosis or clubbing. LUE dressed.  Skin: Warm. Dry. Good turgor. No rash. No vasculitic stigmata.  Psychiatric: Appropriate affect and mood for situation.       Laboratory Studies--  CBC                        11.4   9.82  )-----------( 282      ( 05 May 2025 05:52 )             33.0       Chemistries  05-05    138  |  106  |  19  ----------------------------<  156[H]  4.0   |  20[L]  |  0.95    Ca    8.2[L]      05 May 2025 05:52  Phos  3.2     05-05  Mg     2.30     05-05        Culture Data    Culture - Abscess with Gram Stain (05.03.25 @ 11:29)    Gram Stain:   Few polymorphonuclear leukocytes per low power field  Few Gram Positive Rods per oil power field  Few Gram positive cocci in pairs per oil power field   Specimen Source: Abscess forearm left   Culture Results:   Numerous Arcanobacterium haemolyticum "Susceptibilities not performed"  Staphylococcus aureus  Streptococcus pyogenes (Group A)  Penicillin and ampicillin are drugs of choice for  treatment of beta-hemolytic streptococcal infections.  Susceptibility testing is not performed routinely because  S. pyogenes (GAS) is universally susceptible to penicillin  and resistance in other strains is extremely rare.      Culture - Blood (collected 03 May 2025 02:00)  Source: Blood Blood-Peripheral  Preliminary Report (05 May 2025 09:01):    No growth at 48 Hours    Culture - Blood (collected 03 May 2025 01:50)  Source: Blood Blood-Peripheral  Preliminary Report (05 May 2025 09:01):    No growth at 48 Hours

## 2025-05-06 VITALS
OXYGEN SATURATION: 99 % | DIASTOLIC BLOOD PRESSURE: 60 MMHG | TEMPERATURE: 98 F | RESPIRATION RATE: 17 BRPM | HEART RATE: 64 BPM | SYSTOLIC BLOOD PRESSURE: 117 MMHG

## 2025-05-06 LAB
-  CLINDAMYCIN: SIGNIFICANT CHANGE UP
-  ERYTHROMYCIN: SIGNIFICANT CHANGE UP
-  GENTAMICIN: SIGNIFICANT CHANGE UP
-  OXACILLIN: SIGNIFICANT CHANGE UP
-  PENICILLIN: SIGNIFICANT CHANGE UP
-  RIFAMPIN: SIGNIFICANT CHANGE UP
-  TETRACYCLINE: SIGNIFICANT CHANGE UP
-  TRIMETHOPRIM/SULFAMETHOXAZOLE: SIGNIFICANT CHANGE UP
-  VANCOMYCIN: SIGNIFICANT CHANGE UP
ANION GAP SERPL CALC-SCNC: 13 MMOL/L — SIGNIFICANT CHANGE UP (ref 7–14)
BUN SERPL-MCNC: 19 MG/DL — SIGNIFICANT CHANGE UP (ref 7–23)
CALCIUM SERPL-MCNC: 8.4 MG/DL — SIGNIFICANT CHANGE UP (ref 8.4–10.5)
CHLORIDE SERPL-SCNC: 102 MMOL/L — SIGNIFICANT CHANGE UP (ref 98–107)
CO2 SERPL-SCNC: 19 MMOL/L — LOW (ref 22–31)
CREAT SERPL-MCNC: 0.94 MG/DL — SIGNIFICANT CHANGE UP (ref 0.5–1.3)
EGFR: 92 ML/MIN/1.73M2 — SIGNIFICANT CHANGE UP
EGFR: 92 ML/MIN/1.73M2 — SIGNIFICANT CHANGE UP
GLUCOSE BLDC GLUCOMTR-MCNC: 130 MG/DL — HIGH (ref 70–99)
GLUCOSE BLDC GLUCOMTR-MCNC: 139 MG/DL — HIGH (ref 70–99)
GLUCOSE BLDC GLUCOMTR-MCNC: 207 MG/DL — HIGH (ref 70–99)
GLUCOSE BLDC GLUCOMTR-MCNC: 83 MG/DL — SIGNIFICANT CHANGE UP (ref 70–99)
GLUCOSE SERPL-MCNC: 123 MG/DL — HIGH (ref 70–99)
HCT VFR BLD CALC: 35.8 % — LOW (ref 39–50)
HGB BLD-MCNC: 12.5 G/DL — LOW (ref 13–17)
MAGNESIUM SERPL-MCNC: 2.1 MG/DL — SIGNIFICANT CHANGE UP (ref 1.6–2.6)
MCHC RBC-ENTMCNC: 29 PG — SIGNIFICANT CHANGE UP (ref 27–34)
MCHC RBC-ENTMCNC: 34.9 G/DL — SIGNIFICANT CHANGE UP (ref 32–36)
MCV RBC AUTO: 83.1 FL — SIGNIFICANT CHANGE UP (ref 80–100)
METHOD TYPE: SIGNIFICANT CHANGE UP
NRBC # BLD AUTO: 0 K/UL — SIGNIFICANT CHANGE UP (ref 0–0)
NRBC # FLD: 0 K/UL — SIGNIFICANT CHANGE UP (ref 0–0)
NRBC BLD AUTO-RTO: 0 /100 WBCS — SIGNIFICANT CHANGE UP (ref 0–0)
PHOSPHATE SERPL-MCNC: 3.7 MG/DL — SIGNIFICANT CHANGE UP (ref 2.5–4.5)
PLATELET # BLD AUTO: 327 K/UL — SIGNIFICANT CHANGE UP (ref 150–400)
POTASSIUM SERPL-MCNC: 3.7 MMOL/L — SIGNIFICANT CHANGE UP (ref 3.5–5.3)
POTASSIUM SERPL-SCNC: 3.7 MMOL/L — SIGNIFICANT CHANGE UP (ref 3.5–5.3)
RBC # BLD: 4.31 M/UL — SIGNIFICANT CHANGE UP (ref 4.2–5.8)
RBC # FLD: 12.6 % — SIGNIFICANT CHANGE UP (ref 10.3–14.5)
SODIUM SERPL-SCNC: 134 MMOL/L — LOW (ref 135–145)
WBC # BLD: 10.23 K/UL — SIGNIFICANT CHANGE UP (ref 3.8–10.5)
WBC # FLD AUTO: 10.23 K/UL — SIGNIFICANT CHANGE UP (ref 3.8–10.5)

## 2025-05-06 PROCEDURE — 99232 SBSQ HOSP IP/OBS MODERATE 35: CPT

## 2025-05-06 RX ORDER — INSULIN GLARGINE-YFGN 100 [IU]/ML
20 INJECTION, SOLUTION SUBCUTANEOUS
Qty: 1 | Refills: 0
Start: 2025-05-06 | End: 2025-06-04

## 2025-05-06 RX ORDER — INSULIN LISPRO 100 U/ML
2 INJECTION, SOLUTION INTRAVENOUS; SUBCUTANEOUS ONCE
Refills: 0 | Status: COMPLETED | OUTPATIENT
Start: 2025-05-06 | End: 2025-05-06

## 2025-05-06 RX ORDER — ACETAMINOPHEN 500 MG/5ML
2 LIQUID (ML) ORAL
Qty: 0 | Refills: 0 | DISCHARGE
Start: 2025-05-06

## 2025-05-06 RX ORDER — INSULIN LISPRO 100 U/ML
5 INJECTION, SOLUTION INTRAVENOUS; SUBCUTANEOUS
Qty: 0 | Refills: 0 | DISCHARGE
Start: 2025-05-06

## 2025-05-06 RX ORDER — RIVAROXABAN 10 MG/1
1 TABLET, FILM COATED ORAL
Refills: 0 | DISCHARGE

## 2025-05-06 RX ORDER — HUMAN INSULIN 100 [IU]/ML
0 INJECTION, SUSPENSION SUBCUTANEOUS
Refills: 0 | DISCHARGE

## 2025-05-06 RX ORDER — RIVAROXABAN 10 MG/1
1 TABLET, FILM COATED ORAL
Qty: 60 | Refills: 0
Start: 2025-05-06 | End: 2025-06-04

## 2025-05-06 RX ORDER — ACETAMINOPHEN 500 MG/5ML
1000 LIQUID (ML) ORAL ONCE
Refills: 0 | Status: COMPLETED | OUTPATIENT
Start: 2025-05-06 | End: 2025-05-06

## 2025-05-06 RX ORDER — AMOXICILLIN AND CLAVULANATE POTASSIUM 500; 125 MG/1; MG/1
1 TABLET, FILM COATED ORAL
Qty: 20 | Refills: 0
Start: 2025-05-06 | End: 2025-05-15

## 2025-05-06 RX ADMIN — AMPICILLIN SODIUM AND SULBACTAM SODIUM 200 GRAM(S): 1; .5 INJECTION, POWDER, FOR SOLUTION INTRAMUSCULAR; INTRAVENOUS at 05:22

## 2025-05-06 RX ADMIN — ENOXAPARIN SODIUM 40 MILLIGRAM(S): 100 INJECTION SUBCUTANEOUS at 05:23

## 2025-05-06 RX ADMIN — INSULIN LISPRO 8 UNIT(S): 100 INJECTION, SOLUTION INTRAVENOUS; SUBCUTANEOUS at 09:16

## 2025-05-06 RX ADMIN — CLOSTRIDIUM TETANI TOXOID ANTIGEN (FORMALDEHYDE INACTIVATED), CORYNEBACTERIUM DIPHTHERIAE TOXOID ANTIGEN (FORMALDEHYDE INACTIVATED), BORDETELLA PERTUSSIS TOXOID ANTIGEN (GLUTARALDEHYDE INACTIVATED), BORDETELLA PERTUSSIS FILAMENTOUS HEMAGGLUTININ ANTIGEN (FORMALDEHYDE INACTIVATED), BORDETELLA PERTUSSIS PERTACTIN ANTIGEN, AND BORDETELLA PERTUSSIS FIMBRIAE 2/3 ANTIGEN 0.5 MILLILITER(S): 5; 2; 2.5; 5; 3; 5 INJECTION, SUSPENSION INTRAMUSCULAR at 19:21

## 2025-05-06 RX ADMIN — AMPICILLIN SODIUM AND SULBACTAM SODIUM 200 GRAM(S): 1; .5 INJECTION, POWDER, FOR SOLUTION INTRAMUSCULAR; INTRAVENOUS at 18:23

## 2025-05-06 RX ADMIN — INSULIN LISPRO 2 UNIT(S): 100 INJECTION, SOLUTION INTRAVENOUS; SUBCUTANEOUS at 12:48

## 2025-05-06 RX ADMIN — AMPICILLIN SODIUM AND SULBACTAM SODIUM 200 GRAM(S): 1; .5 INJECTION, POWDER, FOR SOLUTION INTRAMUSCULAR; INTRAVENOUS at 11:57

## 2025-05-06 RX ADMIN — Medication 2 MILLIGRAM(S): at 14:30

## 2025-05-06 RX ADMIN — Medication 1 APPLICATION(S): at 11:59

## 2025-05-06 RX ADMIN — Medication 2 MILLIGRAM(S): at 13:37

## 2025-05-06 RX ADMIN — Medication 400 MILLIGRAM(S): at 15:15

## 2025-05-06 RX ADMIN — INSULIN LISPRO 8 UNIT(S): 100 INJECTION, SOLUTION INTRAVENOUS; SUBCUTANEOUS at 18:22

## 2025-05-06 RX ADMIN — Medication 1000 MILLIGRAM(S): at 15:52

## 2025-05-06 RX ADMIN — Medication 81 MILLIGRAM(S): at 11:58

## 2025-05-06 RX ADMIN — METOPROLOL SUCCINATE 100 MILLIGRAM(S): 50 TABLET, EXTENDED RELEASE ORAL at 05:22

## 2025-05-06 NOTE — DISCHARGE NOTE PROVIDER - CARE PROVIDERS DIRECT ADDRESSES
[email or web address]

## 2025-05-06 NOTE — PROGRESS NOTE ADULT - REASON FOR ADMISSION
Left forearm abscess

## 2025-05-06 NOTE — DISCHARGE NOTE PROVIDER - HOSPITAL COURSE
64 M with HTN, HLD, T2DM, CAD s/p stent 2018, p/w c/f left forearm infection for 4d, found to multiple abscess, s/p I&d,      Skin abscess.   - presenting fever/chills. noted to have left forearm abscesses. afebrile, leukocytosis. elev ESR/CRP.  - CT forearm shows Volar distal forearm wound, with localized 1.5 cm subcutaneous abscess. Associated extensive subcutaneous edema which tracks proximally into the upper arm, as described.  do not appear to involve the deep muscle compartment. No soft tissue gas.  - Plastic Surgery: s/p I&D, abscess Cx w/ Arcanobacterium haemolyticum, S. Aureus, Strep Pyogenes  - Infectious Disease: s/p Vanco & Unasyn inhouse, Augmentin 875mg PO Q12h x10 days on discharge, TDaP vaccination, follow up outpatient   - BCx w/ NGTD    [ ] PlasticSx: s/p I&D 5/5, c/w ABx, considering further debridement/secondary repair in next 48hrs/prior to d/c  -will likely need further surgical intervention  -xarelto held for possible procedure      Diabetes.   - at home takes humulin 70/30 35u qam and 25u qhs    -fu a1c  -order lantus 2ou qhs and lispro 8u tid ac, mISS. FSG tid ac and qhs (uptitrate as needed, strict FSG goal)  - cc diet.    HTN (hypertension).   -hold home amlodipine and hyzaar (resume as needed)  -cont toprol.    CAD (coronary artery disease).   - s/p stent 2018  -cont aspirin  -obtain ekg  -hold xarelto for now for possible repeat surgical interventions  -cardiology consulted for preop assessment.    HLD (hyperlipidemia).   - cont atorvastatin.    On 5/6/25, case was discussed with , patient is medically cleared and optimized for discharge today. All medications were reviewed with attending, and sent to mutually agreed upon pharmacy. 64 M with HTN, HLD, T2DM, CAD s/p stent 2018, p/w c/f left forearm infection for 4d, found to multiple abscess, s/p I&d,      Skin abscess.   - presenting fever/chills. noted to have left forearm abscesses. afebrile, leukocytosis. elev ESR/CRP.  - CT forearm shows Volar distal forearm wound, with localized 1.5 cm subcutaneous abscess. Associated extensive subcutaneous edema which tracks proximally into the upper arm, as described.  do not appear to involve the deep muscle compartment. No soft tissue gas.  - Plastic Surgery: s/p I&D and secondary repair of all left and forearm wounds with excellent hemostasis, resume Xarelto, d/c w/ PO ABx & percocet and f/u outpt  - Infectious Disease: abscess Cx w/ Arcanobacterium haemolyticum, S. Aureus, Strep Pyogenes, s/p Vanco & Unasyn inhouse, Augmentin 875mg PO Q12h x10 days on discharge, TDaP vaccination, follow up outpatient   - BCx w/ NGTD    Diabetes.   - at home takes humulin 70/30 35u qam and 25u qhs  - a1c 8.8  - ordered lantus 2ou qhs and lispro 8u tid ac, mISS. FSG tid ac and qhs (uptitrate as needed, strict FSG goal)  - cc diet.     ____INCOMPLETE______    HTN (hypertension).   -hold home amlodipine and hyzaar (resume as needed)  -cont toprol.    CAD (coronary artery disease).   - s/p stent 2018  -cont aspirin  -obtain ekg  -hold xarelto for now for possible repeat surgical interventions  -cardiology consulted for preop assessment.    HLD (hyperlipidemia).   - cont atorvastatin.    On 5/6/25, case was discussed with , patient is medically cleared and optimized for discharge today. All medications were reviewed with attending, and sent to mutually agreed upon pharmacy.    64 M with HTN, HLD, T2DM, CAD s/p stent 2018, p/w c/f left forearm infection for 4d, found to multiple abscess, s/p I&d,      Skin abscess.   - presenting fever/chills. noted to have left forearm abscesses. afebrile, leukocytosis. elev ESR/CRP. No evidence of sepsis after admission  - CT forearm shows Volar distal forearm wound, with localized 1.5 cm subcutaneous abscess. Associated extensive subcutaneous edema which tracks proximally into the upper arm, as described.  do not appear to involve the deep muscle compartment. No soft tissue gas.  - Plastic Surgery: s/p I&D and secondary repair of all left and forearm wounds with excellent hemostasis, resume Xarelto, d/c w/ PO ABx & percocet and f/u outpt  - Infectious Disease: abscess Cx w/ Arcanobacterium haemolyticum, S. Aureus, Strep Pyogenes, s/p Vanco & Unasyn inhouse, Augmentin 875mg PO Q12h x10 days on discharge, TDaP vaccination, follow up outpatient with Dr Rm plastics   discharge on Augmentin 875 BID x 10 days    Diabetes.   - at home takes humulin 70/30 35u qam and 25u qhs  - a1c 8.8  discharge on  lantus 20 u qhs and lispro 5u tid ac, mISS. FSG tid ac and qhs (uptitrate as needed, strict FSG goal)  - cc diet.         HTN (hypertension).   -hold home amlodipine and hyzaar (resume as needed)  -cont toprol.    CAD (coronary artery disease).   - s/p stent 2018  -cont aspirin  change rivaroxaban to PAD dose 2.5 BID  -cardiology help appreciated     HLD (hyperlipidemia).   - cont atorvastatin.    On 5/6/25, case was discussed with , patient is medically cleared and optimized for discharge today. All medications were reviewed with attending, and sent to mutually agreed upon pharmacy. Th ept was discharged on his own request and responsibility as he stated that his mother is gravely ill and he needs to go home immediately tonight      encounter 50 min including PE, progress note, medication adjustment and d/w ACP and patient

## 2025-05-06 NOTE — PROGRESS NOTE ADULT - PROBLEM SELECTOR PLAN 3
acceptable off amlodipine and Hyzaar   continue metoprolol ER  resume other meds as needed

## 2025-05-06 NOTE — DISCHARGE NOTE NURSING/CASE MANAGEMENT/SOCIAL WORK - FINANCIAL ASSISTANCE
[facility] provides services at a reduced cost to those who are determined to be eligible through [facility]’s financial assistance program. Information regarding [facility]’s financial assistance program can be found by going to [email or web address] or by calling [phone].

## 2025-05-06 NOTE — PROCEDURAL SAFETY CHECKLIST WITH OR WITHOUT SEDATION - NSPOSTCOMMENTFT_GEN_ALL_CORE
pt tolerate procedure well. minimal bleeding noted. pain medication management provided 2mg morphine

## 2025-05-06 NOTE — PROGRESS NOTE ADULT - PROBLEM SELECTOR PLAN 2
finger sticks with short acting insulin sliding scale  no oral meds  diabetic diet  monitor for hypoglycemia  HbA1c 8.8
finger sticks with short acting insulin sliding scale  no oral meds  diabetic diet  monitor for hypoglycemia  HbA1c 8.8  will reduce PM Lantus AM glu < 90
finger sticks with short acting insulin sliding scale  no oral meds  diabetic diet  monitor for hypoglycemia  HbA1c 8.8  continue Lantus reduced dose

## 2025-05-06 NOTE — PROGRESS NOTE ADULT - SUBJECTIVE AND OBJECTIVE BOX
DATE OF SERVICE: 05-06-25    Patient denies chest pain or shortness of breath.   Review of symptoms otherwise negative.    MEDICATIONS:  acetaminophen     Tablet .. 650 milliGRAM(s) Oral every 6 hours PRN  ampicillin/sulbactam  IVPB 3 Gram(s) IV Intermittent every 6 hours  aspirin enteric coated 81 milliGRAM(s) Oral daily  atorvastatin 40 milliGRAM(s) Oral at bedtime  chlorhexidine 2% Cloths 1 Application(s) Topical daily  dextrose 5%. 1000 milliLiter(s) IV Continuous <Continuous>  dextrose 5%. 1000 milliLiter(s) IV Continuous <Continuous>  dextrose 50% Injectable 25 Gram(s) IV Push once  dextrose 50% Injectable 12.5 Gram(s) IV Push once  dextrose 50% Injectable 25 Gram(s) IV Push once  dextrose Oral Gel 15 Gram(s) Oral once PRN  diphtheria/tetanus/pertussis (acellular) Vaccine (Adacel) 0.5 milliLiter(s) IntraMuscular once  enoxaparin Injectable 40 milliGRAM(s) SubCutaneous every 24 hours  glucagon  Injectable 1 milliGRAM(s) IntraMuscular once  insulin glargine Injectable (LANTUS) 25 Unit(s) SubCutaneous at bedtime  insulin lispro (ADMELOG) corrective regimen sliding scale   SubCutaneous Before meals and at bedtime  insulin lispro Injectable (ADMELOG) 8 Unit(s) SubCutaneous three times a day before meals  metoprolol succinate  milliGRAM(s) Oral daily      LABS:                        12.5   10.23 )-----------( 327      ( 06 May 2025 07:48 )             35.8       Hemoglobin: 12.5 g/dL (05-06 @ 07:48)  Hemoglobin: 11.4 g/dL (05-05 @ 05:52)  Hemoglobin: 11.6 g/dL (05-04 @ 06:14)  Hemoglobin: 12.9 g/dL (05-03 @ 01:54)      05-06    134[L]  |  102  |  19  ----------------------------<  123[H]  3.7   |  19[L]  |  0.94    Ca    8.4      06 May 2025 07:48  Phos  3.7     05-06  Mg     2.10     05-06      Creatinine Trend: 0.94<--, 0.95<--, 1.04<--, 1.01<--    COAGS:           PHYSICAL EXAM:  T(C): 36.7 (05-06-25 @ 05:00), Max: 36.9 (05-05-25 @ 13:16)  HR: 74 (05-06-25 @ 05:00) (68 - 79)  BP: 144/87 (05-06-25 @ 05:00) (128/73 - 151/88)  RR: 18 (05-06-25 @ 05:00) (18 - 18)  SpO2: 97% (05-06-25 @ 05:00) (97% - 100%)  Wt(kg): --    I&O's Summary    05 May 2025 07:01  -  06 May 2025 07:00  --------------------------------------------------------  IN: 2340 mL / OUT: 1100 mL / NET: 1240 mL      General: Well nourished in no acute distress. Alert and Oriented * 3.   Head: Normocephalic and atraumatic.   Neck: No JVD. No bruits. Supple. Does not appear to be enlarged.   Cardiovascular: + S1,S2 ; RRR Soft systolic murmur at the left lower sternal border. No rubs noted.    Lungs: CTA b/l. No rhonchi, rales or wheezes.   Abdomen: + BS, soft. Non tender. Non distended. No rebound. No guarding.   Extremities: No clubbing/cyanosis/edema.   Neurologic: Moves all four extremities. Full range of motion.   Skin: Warm and moist. The patient's skin has normal elasticity and good skin turgor.   Psychiatric: Appropriate mood and affect.  Musculoskeletal: Normal range of motion, normal strength    DATA    A/P) 65 y/o male PMH hypertension, hyperlipidemia, diabetes, and CAD s/p PCI (2018). His last echo and NST in our office in early 2024 were unremarkable. He is now a/w a left forearm infection, and cardiology is called in case plastic surgery needs to perform redebridements and/or drainages and eventual delayed primary repair of the significant large wounds before discharge. His RCRI score is 2 for diabetes on insulin and CAD s/p NSTEMI. He denies angina nor dyspnea.    - continue aspirin 81mg daily for the secondary prevention of MI  - continue lipitor for goal LDL < 70  - continue toprol for hypertension, consider adding ARB given hypertension with DM  - given his RCRI score of 2 he is high risk for intermediate risk surgery, but is otherwise optimized from a cardiac perspective  - f/u with Dr Alexis after discharge [phone]    Nichelle Ross MD  Pager: [phone]  Office: [phone]

## 2025-05-06 NOTE — PROGRESS NOTE ADULT - SUBJECTIVE AND OBJECTIVE BOX
Patient is a 64y old  Male who presents with a chief complaint of Left forearm abscess (06 May 2025 10:29)      DATE OF SERVICE: 05-06-25 @ 11:41    SUBJECTIVE / OVERNIGHT EVENTS: overnight events noted    ROS:  Resp: No cough no sputum production  CVS: No chest pain no palpitations no orthopnea  GI: no N/V/D          MEDICATIONS  (STANDING):  ampicillin/sulbactam  IVPB 3 Gram(s) IV Intermittent every 6 hours  aspirin enteric coated 81 milliGRAM(s) Oral daily  atorvastatin 40 milliGRAM(s) Oral at bedtime  chlorhexidine 2% Cloths 1 Application(s) Topical daily  dextrose 5%. 1000 milliLiter(s) (50 mL/Hr) IV Continuous <Continuous>  dextrose 5%. 1000 milliLiter(s) (100 mL/Hr) IV Continuous <Continuous>  dextrose 50% Injectable 25 Gram(s) IV Push once  dextrose 50% Injectable 12.5 Gram(s) IV Push once  dextrose 50% Injectable 25 Gram(s) IV Push once  diphtheria/tetanus/pertussis (acellular) Vaccine (Adacel) 0.5 milliLiter(s) IntraMuscular once  enoxaparin Injectable 40 milliGRAM(s) SubCutaneous every 24 hours  glucagon  Injectable 1 milliGRAM(s) IntraMuscular once  insulin glargine Injectable (LANTUS) 25 Unit(s) SubCutaneous at bedtime  insulin lispro (ADMELOG) corrective regimen sliding scale   SubCutaneous Before meals and at bedtime  insulin lispro Injectable (ADMELOG) 8 Unit(s) SubCutaneous three times a day before meals  metoprolol succinate  milliGRAM(s) Oral daily    MEDICATIONS  (PRN):  acetaminophen     Tablet .. 650 milliGRAM(s) Oral every 6 hours PRN Severe Pain (7 - 10)  dextrose Oral Gel 15 Gram(s) Oral once PRN Blood Glucose LESS THAN 70 milliGRAM(s)/deciliter        CAPILLARY BLOOD GLUCOSE      POCT Blood Glucose.: 130 mg/dL (06 May 2025 08:40)  POCT Blood Glucose.: 135 mg/dL (05 May 2025 21:46)  POCT Blood Glucose.: 117 mg/dL (05 May 2025 17:41)  POCT Blood Glucose.: 90 mg/dL (05 May 2025 12:40)    I&O's Summary    05 May 2025 07:01  -  06 May 2025 07:00  --------------------------------------------------------  IN: 2340 mL / OUT: 1100 mL / NET: 1240 mL        Vital Signs Last 24 Hrs  T(C): 36.7 (06 May 2025 05:00), Max: 36.9 (05 May 2025 13:16)  T(F): 98.1 (06 May 2025 05:00), Max: 98.4 (05 May 2025 13:16)  HR: 74 (06 May 2025 05:00) (68 - 79)  BP: 144/87 (06 May 2025 05:00) (128/73 - 151/88)  BP(mean): --  RR: 18 (06 May 2025 05:00) (18 - 18)  SpO2: 97% (06 May 2025 05:00) (97% - 100%)    PHYSICAL EXAM:  CHEST/LUNG: clear  HEART: S1 S2; no murmurs   ABDOMEN: Soft, Nontender  EXTREMITIES:  left forearm bandaged no edema  NEUROLOGY: AO x 3 non-focal  SKIN: No rashes or lesions    LABS:                        12.5   10.23 )-----------( 327      ( 06 May 2025 07:48 )             35.8     05-06    134[L]  |  102  |  19  ----------------------------<  123[H]  3.7   |  19[L]  |  0.94    Ca    8.4      06 May 2025 07:48  Phos  3.7     05-06  Mg     2.10     05-06            Urinalysis Basic - ( 06 May 2025 07:48 )    Color: x / Appearance: x / SG: x / pH: x  Gluc: 123 mg/dL / Ketone: x  / Bili: x / Urobili: x   Blood: x / Protein: x / Nitrite: x   Leuk Esterase: x / RBC: x / WBC x   Sq Epi: x / Non Sq Epi: x / Bacteria: x          All consultant(s) notes reviewed and care discussed with other providers        Contact Number, Dr Ramon [phone]

## 2025-05-06 NOTE — PROGRESS NOTE ADULT - PROBLEM SELECTOR PLAN 4
chest pain free  continue home meds with hold parameters

## 2025-05-06 NOTE — PROGRESS NOTE ADULT - SUBJECTIVE AND OBJECTIVE BOX
Appreciate all efforts.  Case discussed w Dr Ramon.  C/O little to no pain but recurrent bleeding.  Wounds are healing nicely with good granulating tissues.  No erythema, fluctuance or drainage. N&V intact.  In lieu of enormity of the wounds, h/o IDDM, exposed underlying N&V and tendons as well as need for anticoagulation, options/alts/risks/cxs discussed and written informed consent obtained.  Tolerated secondary repair of all left and and forearm wounds with excellent hemostasis.  May wash in two days.  May resume anticoags ASAP as before.  Keep elevated.  Consider discharge home, on po abx and when clinically stable and cleared by ID and close f/u next wk in my office. Please give analgesics eg Percocet or Ultram for after discharge.  Thank you.

## 2025-05-06 NOTE — PROGRESS NOTE ADULT - SUBJECTIVE AND OBJECTIVE BOX
[facility]  Division of Infectious Diseases  [phone]    Name: ISAAC GARCIA  Age: 64y  Gender: Male  MRN: [id number]    Interval History--  Notes reviewed.     Past Medical History--  Diabetes    HTN (hypertension)    HLD (hyperlipidemia)    DKA (diabetic ketoacidoses)    No significant past surgical history    No significant past surgical history    History of appendectomy        For details regarding the patient's social history, family history, and other miscellaneous elements, please refer the initial infectious diseases consultation and/or the admitting history and physical examination for this admission.    Allergies    iodinated radiocontrast agents (Hives; Swelling)    Intolerances        Medications--  Antibiotics:  ampicillin/sulbactam  IVPB 3 Gram(s) IV Intermittent every 6 hours    Immunologic:  diphtheria/tetanus/pertussis (acellular) Vaccine (Adacel) 0.5 milliLiter(s) IntraMuscular once    Other:  acetaminophen     Tablet .. PRN  aspirin enteric coated  atorvastatin  chlorhexidine 2% Cloths  dextrose 5%.  dextrose 5%.  dextrose 50% Injectable  dextrose 50% Injectable  dextrose 50% Injectable  dextrose Oral Gel PRN  enoxaparin Injectable  glucagon  Injectable  insulin glargine Injectable (LANTUS)  insulin lispro (ADMELOG) corrective regimen sliding scale  insulin lispro Injectable (ADMELOG)  metoprolol succinate ER      Review of Systems--  A 10-point review of systems was obtained.     Pertinent positives and negatives--  Constitutional: No fevers. No Chills. No Rigors.   Cardiovascular: No chest pain. No palpitations.  Respiratory: No shortness of breath. No cough.  Gastrointestinal: No nausea or vomiting. No diarrhea or constipation.   Psychiatric: Pleasant. Appropriate affect.    Review of systems otherwise negative except as previously noted.    Physical Examination--  Vital Signs: T(F): 98.1 (05-06-25 @ 05:00), Max: 98.4 (05-05-25 @ 13:16)  HR: 74 (05-06-25 @ 05:00)  BP: 144/87 (05-06-25 @ 05:00)  RR: 18 (05-06-25 @ 05:00)  SpO2: 97% (05-06-25 @ 05:00)  Wt(kg): --  General: Nontoxic-appearing Male in no acute distress.  HEENT: AT/NC. PERRL. EOMI. Anicteric. Conjunctiva pink and moist. Oropharynx clear. Dentition fair.  Neck: Not rigid. No sense of mass.  Nodes: None palpable.  Lungs: Clear bilaterally without rales, wheezing or rhonchi  Heart: Regular rate and rhythm. No Murmur. No rub. No gallop. No palpable thrill.  Abdomen: Bowel sounds present and normoactive. Soft. Nondistended. Nontender. No sense of mass. No organomegaly.  Back: No spinal tenderness. No costovertebral angle tenderness.   Extremities: No cyanosis or clubbing. No edema.   Skin: Warm. Dry. Good turgor. No rash. No vasculitic stigmata.  Psychiatric: Appropriate affect and mood for situation.         Laboratory Studies--  CBC                        12.5   10.23 )-----------( 327      ( 06 May 2025 07:48 )             35.8       Chemistries  05-06    134[L]  |  102  |  19  ----------------------------<  123[H]  3.7   |  19[L]  |  0.94    Ca    8.4      06 May 2025 07:48  Phos  3.7     05-06  Mg     2.10     05-06        Culture Data    Culture - Abscess with Gram Stain (collected 03 May 2025 11:29)  Source: Abscess forearm left  Gram Stain (03 May 2025 23:33):    Few polymorphonuclear leukocytes per low power field    Few Gram Positive Rods per oil power field    Few Gram positive cocci in pairs per oil power field  Preliminary Report (05 May 2025 14:10):    Numerous Arcanobacterium haemolyticum "Susceptibilities not performed"    Staphylococcus aureus    Streptococcus pyogenes (Group A)    Penicillin and ampicillin are drugs of choice for    treatment of beta-hemolytic streptococcal infections.    Susceptibility testing is not performed routinely because    S. pyogenes (GAS) is universally susceptible to penicillin    and resistance in other strains is extremely rare.    Culture - Blood (collected 03 May 2025 02:00)  Source: Blood Blood-Peripheral  Preliminary Report (06 May 2025 09:01):    No growth at 72 Hours    Culture - Blood (collected 03 May 2025 01:50)  Source: Blood Blood-Peripheral  Preliminary Report (06 May 2025 09:01):    No growth at 72 Hours             [facility]  Division of Infectious Diseases  [phone]    Name: ISAAC GARCIA  Age: 64y  Gender: Male  MRN: [id number]    Interval History--  No interval notes. Seen earlier today. Feeling ok. No fevers, chills, or rigors. Pain control not an issue. Awaiting hand surgery f./u.     Past Medical History--  Diabetes    HTN (hypertension)    HLD (hyperlipidemia)    DKA (diabetic ketoacidoses)    No significant past surgical history    No significant past surgical history    History of appendectomy        For details regarding the patient's social history, family history, and other miscellaneous elements, please refer the initial infectious diseases consultation and/or the admitting history and physical examination for this admission.    Allergies    iodinated radiocontrast agents (Hives; Swelling)    Intolerances        Medications--  Antibiotics:  ampicillin/sulbactam  IVPB 3 Gram(s) IV Intermittent every 6 hours    Immunologic:  diphtheria/tetanus/pertussis (acellular) Vaccine (Adacel) 0.5 milliLiter(s) IntraMuscular once    Other:  acetaminophen     Tablet .. PRN  aspirin enteric coated  atorvastatin  chlorhexidine 2% Cloths  dextrose 5%.  dextrose 5%.  dextrose 50% Injectable  dextrose 50% Injectable  dextrose 50% Injectable  dextrose Oral Gel PRN  enoxaparin Injectable  glucagon  Injectable  insulin glargine Injectable (LANTUS)  insulin lispro (ADMELOG) corrective regimen sliding scale  insulin lispro Injectable (ADMELOG)  metoprolol succinate ER      Review of Systems--  A 10-point review of systems was obtained.   Review of systems otherwise negative except as previously noted.    Physical Examination--  Vital Signs: T(F): 98.1 (05-06-25 @ 05:00), Max: 98.4 (05-05-25 @ 13:16)  HR: 74 (05-06-25 @ 05:00)  BP: 144/87 (05-06-25 @ 05:00)  RR: 18 (05-06-25 @ 05:00)  SpO2: 97% (05-06-25 @ 05:00)  Wt(kg): --  General: Nontoxic-appearing Male in no acute distress.  HEENT: AT/NC. Anicteric. Conjunctiva pink and moist. Oropharynx clear.   Neck: Not rigid. No sense of mass.  Nodes: None palpable.  Lungs: Clear bilaterally   Heart: Regular rate and rhythm.  Abdomen: Bowel sounds present and normoactive. Soft. Nondistended. Nontender.   Extremities: No cyanosis or clubbing. LUE dressed.  Skin: Warm. Dry. Good turgor. No rash. No vasculitic stigmata.  Psychiatric: Appropriate affect and mood for situation.       Laboratory Studies--  CBC                        12.5   10.23 )-----------( 327      ( 06 May 2025 07:48 )             35.8       Chemistries  05-06    134[L]  |  102  |  19  ----------------------------<  123[H]  3.7   |  19[L]  |  0.94    Ca    8.4      06 May 2025 07:48  Phos  3.7     05-06  Mg     2.10     05-06        Culture Data    Culture - Abscess with Gram Stain (collected 03 May 2025 11:29)  Source: Abscess forearm left  Gram Stain (03 May 2025 23:33):    Few polymorphonuclear leukocytes per low power field    Few Gram Positive Rods per oil power field    Few Gram positive cocci in pairs per oil power field  Preliminary Report (05 May 2025 14:10):    Numerous Arcanobacterium haemolyticum "Susceptibilities not performed"    Staphylococcus aureus (MSSA)    Streptococcus pyogenes (Group A)    Penicillin and ampicillin are drugs of choice for    treatment of beta-hemolytic streptococcal infections.    Susceptibility testing is not performed routinely because    S. pyogenes (GAS) is universally susceptible to penicillin    and resistance in other strains is extremely rare.    Culture - Blood (collected 03 May 2025 02:00)  Source: Blood Blood-Peripheral  Preliminary Report (06 May 2025 09:01):    No growth at 72 Hours    Culture - Blood (collected 03 May 2025 01:50)  Source: Blood Blood-Peripheral  Preliminary Report (06 May 2025 09:01):    No growth at 72 Hours

## 2025-05-06 NOTE — DISCHARGE NOTE PROVIDER - CARE PROVIDER_API CALL
Mavrin Dickerson  Plastic Surgery  [address], [address]  [place] [address]  Phone: [phone]  Fax: [phone]  Follow Up Time:     Artur Alexis  Cardiology  [address], [address]  [place] [address]  Phone: [phone]  Fax: [phone]  Follow Up Time:     Dominique Montenegro  Endocrinology/Metab/Diabetes  [address], [address]  [place] [address]  Phone: [phone]  Fax: [phone]  Follow Up Time:

## 2025-05-06 NOTE — DISCHARGE NOTE PROVIDER - NSDCCPCAREPLAN_GEN_ALL_CORE_FT
PRINCIPAL DISCHARGE DIAGNOSIS  Diagnosis: Cellulitis and abscess of forearm  Assessment and Plan of Treatment: continue antibiotics as prescribed  follow up with Dr Rm this week      SECONDARY DISCHARGE DIAGNOSES  Diagnosis: Diabetes  Assessment and Plan of Treatment: continue Lantus as prescribed  follow up PCP 2 weeks for Lantus adjustment  do NOT take Humilin 70/30 any more    Diagnosis: CAD (coronary artery disease)  Assessment and Plan of Treatment: continue ASA metoprolol  follow up Dr Alexis 1 month    Diagnosis: HTN (hypertension)  Assessment and Plan of Treatment: continue home meds    Diagnosis: HLD (hyperlipidemia)  Assessment and Plan of Treatment: continue atorvastatin

## 2025-05-06 NOTE — PROCEDURAL SAFETY CHECKLIST WITH OR WITHOUT SEDATION - NSPRESEDATION2FT_GEN_ALL_CORE
no pain, swelling or deformity of joints joint limitation right... Care of the patient is governed by the Department of Anesthesia policy and procedure manual.

## 2025-05-06 NOTE — PROGRESS NOTE ADULT - PROBLEM SELECTOR PLAN 1
discussed with ID  will continue to monitor   continue antibiotics   Unasyn and vancomycin   local care per plastics attending   does not meet sepsis criteria
improved overall  continue antibiotics   Unasyn and vancomycin   local care per plastics attending
improved overall  continue antibiotics   Unasyn and vancomycin   local care per plastics attending  discussed with Dr Rm  likely intervention later today  discharge plan tomorrow if OK  ID follow up

## 2025-05-06 NOTE — DISCHARGE NOTE NURSING/CASE MANAGEMENT/SOCIAL WORK - NSDCVIVACCINE_GEN_ALL_CORE_FT
Tdap; 06-May-2025 19:21; Raquel Vu); [employer]; [id number] (Exp. Date: 01-Jan-2027); IntraMuscular; Deltoid Right.; 0.5 milliLiter(s); VIS (VIS Published: 09-May-2013, VIS Presented: 06-May-2025);

## 2025-05-06 NOTE — PROGRESS NOTE ADULT - ASSESSMENT
S/P I+D of L forearm abscesses. Right handed  Initially injured himself on 4/29 on a dirty piece of wood in his garage  Hx DM, suboptimal control  Cx pending, gram stain w GPR  Acute onset, low suspicion that this would be actinomyces, nocardia, clostridial or fungal.  Prob S. aureus, but polymicrobial milieu expected.  Not UTD on tetanus- should be boosted    05/05: No sensi on S. aureus, no MRSA RF elicited and MRSA PCR negative. Recognizing that drainiage is the crucial element of therapy here, I'm comfortable not resuming vancomycin here. Arcanobacterium is better known as a cause of pharyngitis (like group A Strep), but can be a cause of skin/soft tissue infections. DM is a known RF for such. Isolates are usuallu PCN sensiitive, but can display tolerance due to relatively low intracellular levels of beta lactams. Azithro has good activity. Again drainage is the crucial element.  05/06: SMonty pinto is an MSSA. Appears to be doing well. Awaiting hand surgery follow up.     Suggestions--  Presuming ready from a hand surgery perspective no ID objection to dischage on Augmentin 875mg PO Q12h x10 days.   Tdap prior to discharge  Left message for Dr. Ramon.  Please recall as needed. I will sign off at this time.  Thank you for the courtesy of this referral.     Matthieu Cordova MD  Attending Physician  [facility]  Division of Infectious Diseases  [phone] S/P I+D of L forearm abscesses. Right handed  Initially injured himself on 4/29 on a dirty piece of wood in his garage  Hx DM, suboptimal control  Cx pending, gram stain w GPR  Acute onset, low suspicion that this would be actinomyces, nocardia, clostridial or fungal.  Prob S. aureus, but polymicrobial milieu expected.  Not UTD on tetanus- should be boosted    05/05: No sensi on S. aureus, no MRSA RF elicited and MRSA PCR negative. Recognizing that drainiage is the crucial element of therapy here, I'm comfortable not resuming vancomycin here. Arcanobacterium is better known as a cause of pharyngitis (like group A Strep), but can be a cause of skin/soft tissue infections. DM is a known RF for such. Isolates are usuallu PCN sensiitive, but can display tolerance due to relatively low intracellular levels of beta lactams. Azithro has good activity. Again drainage is the crucial element.  05/06: JEFF pinto is an MSSA. Appears to be doing well. Awaiting hand surgery follow up.     Suggestions--  Presuming ready from a hand surgery perspective no ID objection to dischage on Augmentin 875mg PO Q12h x10 days.   Tdap prior to discharge  Happy to see patient in office in follow up if needed  Left message for Dr. Ramon.  Please recall as needed. I will sign off at this time.  Thank you for the courtesy of this referral.     Matthieu Cordova MD  Attending Physician  [facility]  Division of Infectious Diseases  [phone]

## 2025-05-06 NOTE — DISCHARGE NOTE NURSING/CASE MANAGEMENT/SOCIAL WORK - PATIENT PORTAL LINK FT
You can access the FollowMyHealth Patient Portal offered by [facility] by registering at the following website: [email or web address]. By joining [facility]’s FollowMyHealth portal, you will also be able to view your health information using other applications (apps) compatible with our system.

## 2025-05-06 NOTE — DISCHARGE NOTE PROVIDER - PROVIDER TOKENS
PROVIDER:[TOKEN:[[id number]]],PROVIDER:[TOKEN:[[id number]]],PROVIDER:[TOKEN:[[id number]]]

## 2025-05-06 NOTE — DISCHARGE NOTE PROVIDER - NSDCMRMEDTOKEN_GEN_ALL_CORE_FT
amLODIPine 10 mg oral tablet: 1 tab(s) orally once a day  aspirin 81 mg oral delayed release tablet: 1 tab(s) orally once a day  atorvastatin 40 mg oral tablet: 1 tab(s) orally once a day (at bedtime)  HumuLIN 70/30 KwikPen 70 units-30 units/mL subcutaneous suspension: Inject 35 units subcutaneous in the morning and 25 units subcutaneous in the evening  losartan-hydroCHLOROthiazide 100 mg-25 mg oral tablet: 1 tab(s) orally once a day  Metoprolol Succinate  mg oral tablet, extended release: 1 tab(s) orally once a day  Xarelto 20 mg oral tablet: 1 tab(s) orally once a day takes in AM @ home   acetaminophen 325 mg oral tablet: 2 tab(s) orally every 6 hours As needed Severe Pain (7 - 10)  amLODIPine 10 mg oral tablet: 1 tab(s) orally once a day  amoxicillin-clavulanate 875 mg-125 mg oral tablet: 1 tab(s) orally 2 times a day  aspirin 81 mg oral delayed release tablet: 1 tab(s) orally once a day  atorvastatin 40 mg oral tablet: 1 tab(s) orally once a day (at bedtime)  insulin glargine 100 units/mL subcutaneous solution: 20 unit(s) subcutaneous once a day (at bedtime)  insulin lispro 100 units/mL injectable solution: 5 unit(s) injectable 3 times a day (after meals)  losartan-hydroCHLOROthiazide 100 mg-25 mg oral tablet: 1 tab(s) orally once a day  Metoprolol Succinate  mg oral tablet, extended release: 1 tab(s) orally once a day  Xarelto 2.5 mg oral tablet: 1 tab(s) orally 2 times a day

## 2025-05-06 NOTE — PROGRESS NOTE ADULT - PROVIDER SPECIALTY LIST ADULT
Cardiology
Cardiology
Plastic Surgery
Plastic Surgery
Infectious Disease
Infectious Disease
Internal Medicine
Plastic Surgery
Internal Medicine
Internal Medicine

## 2025-05-08 LAB
CULTURE RESULTS: ABNORMAL
CULTURE RESULTS: SIGNIFICANT CHANGE UP
CULTURE RESULTS: SIGNIFICANT CHANGE UP
ORGANISM # SPEC MICROSCOPIC CNT: ABNORMAL
ORGANISM # SPEC MICROSCOPIC CNT: ABNORMAL
SPECIMEN SOURCE: SIGNIFICANT CHANGE UP